# Patient Record
Sex: FEMALE | Race: BLACK OR AFRICAN AMERICAN | NOT HISPANIC OR LATINO | Employment: OTHER | URBAN - METROPOLITAN AREA
[De-identification: names, ages, dates, MRNs, and addresses within clinical notes are randomized per-mention and may not be internally consistent; named-entity substitution may affect disease eponyms.]

---

## 2022-03-12 ENCOUNTER — HOSPITAL ENCOUNTER (INPATIENT)
Facility: OTHER | Age: 82
LOS: 16 days | Discharge: HOME HEALTH | DRG: 872 | End: 2022-03-28
Attending: EMERGENCY MEDICINE | Admitting: INTERNAL MEDICINE
Payer: MEDICARE

## 2022-03-12 DIAGNOSIS — R00.0 TACHYCARDIA: ICD-10-CM

## 2022-03-12 DIAGNOSIS — S20.211A CONTUSION OF RIBS, RIGHT, INITIAL ENCOUNTER: ICD-10-CM

## 2022-03-12 DIAGNOSIS — R80.9 PROTEINURIA, UNSPECIFIED TYPE: ICD-10-CM

## 2022-03-12 DIAGNOSIS — R78.81 BACTEREMIA DUE TO GRAM-POSITIVE BACTERIA: ICD-10-CM

## 2022-03-12 DIAGNOSIS — E87.1 HYPONATREMIA: ICD-10-CM

## 2022-03-12 DIAGNOSIS — R41.82 ALTERED MENTAL STATUS, UNSPECIFIED ALTERED MENTAL STATUS TYPE: Primary | ICD-10-CM

## 2022-03-12 DIAGNOSIS — E83.52 HYPERCALCEMIA: ICD-10-CM

## 2022-03-12 DIAGNOSIS — N17.9 AKI (ACUTE KIDNEY INJURY): ICD-10-CM

## 2022-03-12 DIAGNOSIS — R33.9 URINARY RETENTION: ICD-10-CM

## 2022-03-12 DIAGNOSIS — T14.90XA TRAUMA: ICD-10-CM

## 2022-03-12 DIAGNOSIS — N30.01 ACUTE CYSTITIS WITH HEMATURIA: ICD-10-CM

## 2022-03-12 DIAGNOSIS — E11.65 TYPE 2 DIABETES MELLITUS WITH HYPERGLYCEMIA, WITHOUT LONG-TERM CURRENT USE OF INSULIN: ICD-10-CM

## 2022-03-12 DIAGNOSIS — S09.90XA INJURY OF HEAD, INITIAL ENCOUNTER: ICD-10-CM

## 2022-03-12 DIAGNOSIS — E86.0 DEHYDRATION: ICD-10-CM

## 2022-03-12 DIAGNOSIS — A41.9 SEVERE SEPSIS: ICD-10-CM

## 2022-03-12 DIAGNOSIS — R65.20 SEVERE SEPSIS: ICD-10-CM

## 2022-03-12 PROBLEM — Z79.899 POLYPHARMACY: Status: ACTIVE | Noted: 2021-05-06

## 2022-03-12 PROBLEM — G93.41 ENCEPHALOPATHY, METABOLIC: Status: ACTIVE | Noted: 2022-03-12

## 2022-03-12 PROBLEM — F32.A DEPRESSION: Status: ACTIVE | Noted: 2021-05-06

## 2022-03-12 PROBLEM — F10.10 ETOH ABUSE: Status: ACTIVE | Noted: 2022-03-12

## 2022-03-12 PROBLEM — N39.0 UTI (URINARY TRACT INFECTION): Status: ACTIVE | Noted: 2022-03-12

## 2022-03-12 PROBLEM — Z85.038 HISTORY OF COLON CANCER: Status: ACTIVE | Noted: 2021-05-06

## 2022-03-12 PROBLEM — N18.5 CKD (CHRONIC KIDNEY DISEASE), STAGE V: Status: ACTIVE | Noted: 2022-03-12

## 2022-03-12 LAB
ABO + RH BLD: NORMAL
ALBUMIN SERPL BCP-MCNC: 4 G/DL (ref 3.5–5.2)
ALP SERPL-CCNC: 52 U/L (ref 55–135)
ALT SERPL W/O P-5'-P-CCNC: 42 U/L (ref 10–44)
AMMONIA PLAS-SCNC: 29 UMOL/L (ref 10–50)
AMPHET+METHAMPHET UR QL: NEGATIVE
ANION GAP SERPL CALC-SCNC: 18 MMOL/L (ref 8–16)
AST SERPL-CCNC: 50 U/L (ref 10–40)
B-OH-BUTYR BLD STRIP-SCNC: 2.8 MMOL/L (ref 0–0.5)
BACTERIA #/AREA URNS HPF: ABNORMAL /HPF
BARBITURATES UR QL SCN>200 NG/ML: NEGATIVE
BASOPHILS # BLD AUTO: 0.03 K/UL (ref 0–0.2)
BASOPHILS NFR BLD: 0.2 % (ref 0–1.9)
BENZODIAZ UR QL SCN>200 NG/ML: NEGATIVE
BILIRUB SERPL-MCNC: 0.8 MG/DL (ref 0.1–1)
BILIRUB UR QL STRIP: ABNORMAL
BLD GP AB SCN CELLS X3 SERPL QL: NORMAL
BUN SERPL-MCNC: 64 MG/DL (ref 8–23)
BZE UR QL SCN: NEGATIVE
CALCIUM SERPL-MCNC: 10.9 MG/DL (ref 8.7–10.5)
CANNABINOIDS UR QL SCN: NEGATIVE
CHLORIDE SERPL-SCNC: 92 MMOL/L (ref 95–110)
CK SERPL-CCNC: 833 U/L (ref 20–180)
CLARITY UR: CLEAR
CO2 SERPL-SCNC: 18 MMOL/L (ref 23–29)
COLOR UR: YELLOW
CREAT SERPL-MCNC: 3.4 MG/DL (ref 0.5–1.4)
CREAT UR-MCNC: 162 MG/DL (ref 15–325)
CTP QC/QA: YES
CTP QC/QA: YES
DIFFERENTIAL METHOD: ABNORMAL
EOSINOPHIL # BLD AUTO: 0 K/UL (ref 0–0.5)
EOSINOPHIL NFR BLD: 0 % (ref 0–8)
ERYTHROCYTE [DISTWIDTH] IN BLOOD BY AUTOMATED COUNT: 13.3 % (ref 11.5–14.5)
EST. GFR  (AFRICAN AMERICAN): 14 ML/MIN/1.73 M^2
EST. GFR  (NON AFRICAN AMERICAN): 12 ML/MIN/1.73 M^2
ETHANOL SERPL-MCNC: <10 MG/DL
ETHANOL UR-MCNC: <10 MG/DL
GLUCOSE SERPL-MCNC: 224 MG/DL (ref 70–110)
GLUCOSE UR QL STRIP: ABNORMAL
HCT VFR BLD AUTO: 42 % (ref 37–48.5)
HGB BLD-MCNC: 14.7 G/DL (ref 12–16)
HGB UR QL STRIP: ABNORMAL
HYALINE CASTS #/AREA URNS LPF: 10 /LPF
IMM GRANULOCYTES # BLD AUTO: 0.09 K/UL (ref 0–0.04)
IMM GRANULOCYTES NFR BLD AUTO: 0.6 % (ref 0–0.5)
KETONES UR QL STRIP: NEGATIVE
LACTATE SERPL-SCNC: 2 MMOL/L (ref 0.5–2.2)
LEUKOCYTE ESTERASE UR QL STRIP: NEGATIVE
LYMPHOCYTES # BLD AUTO: 0.8 K/UL (ref 1–4.8)
LYMPHOCYTES NFR BLD: 5.6 % (ref 18–48)
MAGNESIUM SERPL-MCNC: 2.1 MG/DL (ref 1.6–2.6)
MCH RBC QN AUTO: 29.9 PG (ref 27–31)
MCHC RBC AUTO-ENTMCNC: 35 G/DL (ref 32–36)
MCV RBC AUTO: 86 FL (ref 82–98)
METHADONE UR QL SCN>300 NG/ML: NEGATIVE
MICROSCOPIC COMMENT: ABNORMAL
MONOCYTES # BLD AUTO: 0.9 K/UL (ref 0.3–1)
MONOCYTES NFR BLD: 6 % (ref 4–15)
NEUTROPHILS # BLD AUTO: 13 K/UL (ref 1.8–7.7)
NEUTROPHILS NFR BLD: 87.6 % (ref 38–73)
NITRITE UR QL STRIP: NEGATIVE
NRBC BLD-RTO: 0 /100 WBC
OPIATES UR QL SCN: NEGATIVE
PCP UR QL SCN>25 NG/ML: NEGATIVE
PH UR STRIP: 5 [PH] (ref 5–8)
PHOSPHATE SERPL-MCNC: 6 MG/DL (ref 2.7–4.5)
PLATELET # BLD AUTO: 272 K/UL (ref 150–450)
PMV BLD AUTO: 10.2 FL (ref 9.2–12.9)
POC MOLECULAR INFLUENZA A AGN: NEGATIVE
POC MOLECULAR INFLUENZA B AGN: NEGATIVE
POCT GLUCOSE: 236 MG/DL (ref 70–110)
POCT GLUCOSE: 240 MG/DL (ref 70–110)
POTASSIUM SERPL-SCNC: 5.1 MMOL/L (ref 3.5–5.1)
PROCALCITONIN SERPL IA-MCNC: 0.51 NG/ML
PROT SERPL-MCNC: 8.3 G/DL (ref 6–8.4)
PROT UR QL STRIP: ABNORMAL
RBC # BLD AUTO: 4.91 M/UL (ref 4–5.4)
RBC #/AREA URNS HPF: 1 /HPF (ref 0–4)
SARS-COV-2 RDRP RESP QL NAA+PROBE: NEGATIVE
SODIUM SERPL-SCNC: 128 MMOL/L (ref 136–145)
SP GR UR STRIP: >=1.03 (ref 1–1.03)
TOXICOLOGY INFORMATION: NORMAL
URN SPEC COLLECT METH UR: ABNORMAL
UROBILINOGEN UR STRIP-ACNC: NEGATIVE EU/DL
WBC # BLD AUTO: 14.77 K/UL (ref 3.9–12.7)
WBC #/AREA URNS HPF: 1 /HPF (ref 0–5)

## 2022-03-12 PROCEDURE — 36415 COLL VENOUS BLD VENIPUNCTURE: CPT | Performed by: INTERNAL MEDICINE

## 2022-03-12 PROCEDURE — 25000003 PHARM REV CODE 250: Performed by: EMERGENCY MEDICINE

## 2022-03-12 PROCEDURE — 36415 COLL VENOUS BLD VENIPUNCTURE: CPT | Performed by: NURSE PRACTITIONER

## 2022-03-12 PROCEDURE — 87040 BLOOD CULTURE FOR BACTERIA: CPT | Mod: 59 | Performed by: EMERGENCY MEDICINE

## 2022-03-12 PROCEDURE — 99223 PR INITIAL HOSPITAL CARE,LEVL III: ICD-10-PCS | Mod: ,,, | Performed by: NURSE PRACTITIONER

## 2022-03-12 PROCEDURE — 82077 ASSAY SPEC XCP UR&BREATH IA: CPT | Performed by: EMERGENCY MEDICINE

## 2022-03-12 PROCEDURE — 81000 URINALYSIS NONAUTO W/SCOPE: CPT | Performed by: EMERGENCY MEDICINE

## 2022-03-12 PROCEDURE — 80307 DRUG TEST PRSMV CHEM ANLYZR: CPT | Performed by: NURSE PRACTITIONER

## 2022-03-12 PROCEDURE — 99285 EMERGENCY DEPT VISIT HI MDM: CPT | Mod: 25

## 2022-03-12 PROCEDURE — 84207 ASSAY OF VITAMIN B-6: CPT | Performed by: NURSE PRACTITIONER

## 2022-03-12 PROCEDURE — 63600175 PHARM REV CODE 636 W HCPCS: Performed by: EMERGENCY MEDICINE

## 2022-03-12 PROCEDURE — 83605 ASSAY OF LACTIC ACID: CPT | Performed by: EMERGENCY MEDICINE

## 2022-03-12 PROCEDURE — 84145 PROCALCITONIN (PCT): CPT | Mod: 91 | Performed by: INTERNAL MEDICINE

## 2022-03-12 PROCEDURE — 83036 HEMOGLOBIN GLYCOSYLATED A1C: CPT | Performed by: NURSE PRACTITIONER

## 2022-03-12 PROCEDURE — 84145 PROCALCITONIN (PCT): CPT | Performed by: EMERGENCY MEDICINE

## 2022-03-12 PROCEDURE — 11000001 HC ACUTE MED/SURG PRIVATE ROOM

## 2022-03-12 PROCEDURE — 96374 THER/PROPH/DIAG INJ IV PUSH: CPT

## 2022-03-12 PROCEDURE — 93005 ELECTROCARDIOGRAM TRACING: CPT

## 2022-03-12 PROCEDURE — 80053 COMPREHEN METABOLIC PANEL: CPT | Performed by: EMERGENCY MEDICINE

## 2022-03-12 PROCEDURE — 25000003 PHARM REV CODE 250: Performed by: NURSE PRACTITIONER

## 2022-03-12 PROCEDURE — 93010 EKG 12-LEAD: ICD-10-PCS | Mod: ,,, | Performed by: INTERNAL MEDICINE

## 2022-03-12 PROCEDURE — 99223 1ST HOSP IP/OBS HIGH 75: CPT | Mod: ,,, | Performed by: INTERNAL MEDICINE

## 2022-03-12 PROCEDURE — 82550 ASSAY OF CK (CPK): CPT | Performed by: EMERGENCY MEDICINE

## 2022-03-12 PROCEDURE — 82962 GLUCOSE BLOOD TEST: CPT

## 2022-03-12 PROCEDURE — 99223 1ST HOSP IP/OBS HIGH 75: CPT | Mod: ,,, | Performed by: NURSE PRACTITIONER

## 2022-03-12 PROCEDURE — 85025 COMPLETE CBC W/AUTO DIFF WBC: CPT | Performed by: EMERGENCY MEDICINE

## 2022-03-12 PROCEDURE — 86850 RBC ANTIBODY SCREEN: CPT | Performed by: EMERGENCY MEDICINE

## 2022-03-12 PROCEDURE — 84425 ASSAY OF VITAMIN B-1: CPT | Performed by: NURSE PRACTITIONER

## 2022-03-12 PROCEDURE — 99223 PR INITIAL HOSPITAL CARE,LEVL III: ICD-10-PCS | Mod: ,,, | Performed by: INTERNAL MEDICINE

## 2022-03-12 PROCEDURE — U0002 COVID-19 LAB TEST NON-CDC: HCPCS | Performed by: EMERGENCY MEDICINE

## 2022-03-12 PROCEDURE — 82010 KETONE BODYS QUAN: CPT | Performed by: EMERGENCY MEDICINE

## 2022-03-12 PROCEDURE — 82140 ASSAY OF AMMONIA: CPT | Performed by: NURSE PRACTITIONER

## 2022-03-12 PROCEDURE — 84100 ASSAY OF PHOSPHORUS: CPT | Performed by: EMERGENCY MEDICINE

## 2022-03-12 PROCEDURE — 93010 ELECTROCARDIOGRAM REPORT: CPT | Mod: ,,, | Performed by: INTERNAL MEDICINE

## 2022-03-12 PROCEDURE — 96361 HYDRATE IV INFUSION ADD-ON: CPT

## 2022-03-12 PROCEDURE — 83735 ASSAY OF MAGNESIUM: CPT | Performed by: EMERGENCY MEDICINE

## 2022-03-12 RX ORDER — IBUPROFEN 200 MG
24 TABLET ORAL
Status: DISCONTINUED | OUTPATIENT
Start: 2022-03-12 | End: 2022-03-28 | Stop reason: HOSPADM

## 2022-03-12 RX ORDER — INSULIN ASPART 100 [IU]/ML
0-5 INJECTION, SOLUTION INTRAVENOUS; SUBCUTANEOUS
Status: DISCONTINUED | OUTPATIENT
Start: 2022-03-12 | End: 2022-03-28 | Stop reason: HOSPADM

## 2022-03-12 RX ORDER — ROSUVASTATIN CALCIUM 10 MG/1
10 TABLET, COATED ORAL NIGHTLY
COMMUNITY
Start: 2022-02-22

## 2022-03-12 RX ORDER — ALLOPURINOL 100 MG/1
100 TABLET ORAL DAILY
Status: ON HOLD | COMMUNITY
Start: 2022-02-22 | End: 2022-03-23 | Stop reason: HOSPADM

## 2022-03-12 RX ORDER — SODIUM CHLORIDE 9 MG/ML
INJECTION, SOLUTION INTRAVENOUS CONTINUOUS
Status: DISCONTINUED | OUTPATIENT
Start: 2022-03-12 | End: 2022-03-17

## 2022-03-12 RX ORDER — ACETAMINOPHEN 325 MG/1
650 TABLET ORAL EVERY 4 HOURS PRN
Status: DISCONTINUED | OUTPATIENT
Start: 2022-03-12 | End: 2022-03-28 | Stop reason: HOSPADM

## 2022-03-12 RX ORDER — METOPROLOL TARTRATE 25 MG/1
25 TABLET, FILM COATED ORAL 2 TIMES DAILY
Status: ON HOLD | COMMUNITY
Start: 2022-02-22 | End: 2022-03-23 | Stop reason: HOSPADM

## 2022-03-12 RX ORDER — LOSARTAN POTASSIUM 25 MG/1
25 TABLET ORAL DAILY
Status: ON HOLD | COMMUNITY
Start: 2022-02-22 | End: 2022-03-23 | Stop reason: HOSPADM

## 2022-03-12 RX ORDER — ONDANSETRON 2 MG/ML
4 INJECTION INTRAMUSCULAR; INTRAVENOUS EVERY 6 HOURS PRN
Status: DISCONTINUED | OUTPATIENT
Start: 2022-03-12 | End: 2022-03-28 | Stop reason: HOSPADM

## 2022-03-12 RX ORDER — GLUCAGON 1 MG
1 KIT INJECTION
Status: DISCONTINUED | OUTPATIENT
Start: 2022-03-12 | End: 2022-03-28 | Stop reason: HOSPADM

## 2022-03-12 RX ORDER — POLYETHYLENE GLYCOL 3350 17 G/17G
17 POWDER, FOR SOLUTION ORAL 2 TIMES DAILY PRN
Status: DISCONTINUED | OUTPATIENT
Start: 2022-03-12 | End: 2022-03-28 | Stop reason: HOSPADM

## 2022-03-12 RX ORDER — VANCOMYCIN HCL IN 5 % DEXTROSE 1G/250ML
15 PLASTIC BAG, INJECTION (ML) INTRAVENOUS ONCE
Status: COMPLETED | OUTPATIENT
Start: 2022-03-12 | End: 2022-03-12

## 2022-03-12 RX ORDER — METFORMIN HYDROCHLORIDE 500 MG/1
500 TABLET, EXTENDED RELEASE ORAL DAILY
Status: ON HOLD | COMMUNITY
Start: 2022-02-22 | End: 2022-03-23 | Stop reason: HOSPADM

## 2022-03-12 RX ORDER — FLUOXETINE HYDROCHLORIDE 20 MG/1
CAPSULE ORAL
Status: ON HOLD | COMMUNITY
Start: 2021-11-30 | End: 2022-03-23 | Stop reason: HOSPADM

## 2022-03-12 RX ORDER — METOPROLOL TARTRATE 25 MG/1
25 TABLET, FILM COATED ORAL 2 TIMES DAILY
Status: DISCONTINUED | OUTPATIENT
Start: 2022-03-13 | End: 2022-03-12

## 2022-03-12 RX ORDER — IBUPROFEN 200 MG
16 TABLET ORAL
Status: DISCONTINUED | OUTPATIENT
Start: 2022-03-12 | End: 2022-03-28 | Stop reason: HOSPADM

## 2022-03-12 RX ORDER — BUSPIRONE HYDROCHLORIDE 5 MG/1
5 TABLET ORAL 2 TIMES DAILY
Status: ON HOLD | COMMUNITY
Start: 2021-11-05 | End: 2022-03-23 | Stop reason: HOSPADM

## 2022-03-12 RX ADMIN — SODIUM CHLORIDE: 0.9 INJECTION, SOLUTION INTRAVENOUS at 10:03

## 2022-03-12 RX ADMIN — VANCOMYCIN HYDROCHLORIDE 1000 MG: 1 INJECTION, POWDER, LYOPHILIZED, FOR SOLUTION INTRAVENOUS at 07:03

## 2022-03-12 RX ADMIN — SODIUM CHLORIDE 1770 ML: 0.9 INJECTION, SOLUTION INTRAVENOUS at 06:03

## 2022-03-12 NOTE — Clinical Note
Diagnosis: Altered mental status, unspecified altered mental status type [[id number]]   Admitting Provider:: ROBERTA RICARDO [[id number]]   Future Attending Provider: ROBERTA RICARDO [[id number]]   Reason for IP Medical Treatment  (Clinical interventions that can only be accomplished in the IP setting? ) :: IVF, Consults   Estimated Length of Stay:: 2 midnights   I certify that Inpatient services for greater than or equal to 2 midnights are medically necessary:: Yes   Plans for Post-Acute care--if anticipated (pick the single best option):: A. No post acute care anticipated at this time

## 2022-03-12 NOTE — ED NOTES
HPI  Pt brought in by EMS for aloc x today. Per EMS, pt found on ground by family confused with bowel incontinence for unknown duration. Pt awake and alert and following commands but confused. Approx 5 in diameter bruise with distention noted to R lower flank, bruising to chin noted.

## 2022-03-13 LAB
ANION GAP SERPL CALC-SCNC: 12 MMOL/L (ref 8–16)
BNP SERPL-MCNC: 211 PG/ML (ref 0–99)
BUN SERPL-MCNC: 61 MG/DL (ref 8–23)
CALCIUM SERPL-MCNC: 9 MG/DL (ref 8.7–10.5)
CHLORIDE SERPL-SCNC: 102 MMOL/L (ref 95–110)
CO2 SERPL-SCNC: 16 MMOL/L (ref 23–29)
CREAT SERPL-MCNC: 2 MG/DL (ref 0.5–1.4)
ERYTHROCYTE [DISTWIDTH] IN BLOOD BY AUTOMATED COUNT: 13.3 % (ref 11.5–14.5)
EST. GFR  (AFRICAN AMERICAN): 27 ML/MIN/1.73 M^2
EST. GFR  (NON AFRICAN AMERICAN): 23 ML/MIN/1.73 M^2
ESTIMATED AVG GLUCOSE: 143 MG/DL (ref 68–131)
GLUCOSE SERPL-MCNC: 203 MG/DL (ref 70–110)
HBA1C MFR BLD: 6.6 % (ref 4–5.6)
HCT VFR BLD AUTO: 36.1 % (ref 37–48.5)
HGB BLD-MCNC: 12.4 G/DL (ref 12–16)
MAGNESIUM SERPL-MCNC: 1.8 MG/DL (ref 1.6–2.6)
MCH RBC QN AUTO: 29.7 PG (ref 27–31)
MCHC RBC AUTO-ENTMCNC: 34.3 G/DL (ref 32–36)
MCV RBC AUTO: 86 FL (ref 82–98)
OSMOLALITY UR: 467 MOSM/KG (ref 50–1200)
PHOSPHATE SERPL-MCNC: 3.4 MG/DL (ref 2.7–4.5)
PLATELET # BLD AUTO: 197 K/UL (ref 150–450)
PMV BLD AUTO: 10.3 FL (ref 9.2–12.9)
POCT GLUCOSE: 181 MG/DL (ref 70–110)
POCT GLUCOSE: 227 MG/DL (ref 70–110)
POCT GLUCOSE: 235 MG/DL (ref 70–110)
POTASSIUM SERPL-SCNC: 4.7 MMOL/L (ref 3.5–5.1)
PROCALCITONIN SERPL IA-MCNC: 0.34 NG/ML
RBC # BLD AUTO: 4.18 M/UL (ref 4–5.4)
SODIUM SERPL-SCNC: 130 MMOL/L (ref 136–145)
TSH SERPL DL<=0.005 MIU/L-ACNC: 0.67 UIU/ML (ref 0.4–4)
VANCOMYCIN SERPL-MCNC: 5.4 UG/ML
WBC # BLD AUTO: 10.71 K/UL (ref 3.9–12.7)

## 2022-03-13 PROCEDURE — 36415 COLL VENOUS BLD VENIPUNCTURE: CPT | Performed by: NURSE PRACTITIONER

## 2022-03-13 PROCEDURE — 25000003 PHARM REV CODE 250: Performed by: NURSE PRACTITIONER

## 2022-03-13 PROCEDURE — 83935 ASSAY OF URINE OSMOLALITY: CPT | Performed by: NURSE PRACTITIONER

## 2022-03-13 PROCEDURE — 25000003 PHARM REV CODE 250: Performed by: INTERNAL MEDICINE

## 2022-03-13 PROCEDURE — 83735 ASSAY OF MAGNESIUM: CPT | Performed by: NURSE PRACTITIONER

## 2022-03-13 PROCEDURE — 83880 ASSAY OF NATRIURETIC PEPTIDE: CPT | Performed by: NURSE PRACTITIONER

## 2022-03-13 PROCEDURE — 84443 ASSAY THYROID STIM HORMONE: CPT | Performed by: NURSE PRACTITIONER

## 2022-03-13 PROCEDURE — 63600175 PHARM REV CODE 636 W HCPCS: Performed by: NURSE PRACTITIONER

## 2022-03-13 PROCEDURE — 21400001 HC TELEMETRY ROOM

## 2022-03-13 PROCEDURE — 85027 COMPLETE CBC AUTOMATED: CPT | Performed by: NURSE PRACTITIONER

## 2022-03-13 PROCEDURE — 80202 ASSAY OF VANCOMYCIN: CPT | Performed by: NURSE PRACTITIONER

## 2022-03-13 PROCEDURE — 87086 URINE CULTURE/COLONY COUNT: CPT | Performed by: NURSE PRACTITIONER

## 2022-03-13 PROCEDURE — 84100 ASSAY OF PHOSPHORUS: CPT | Performed by: NURSE PRACTITIONER

## 2022-03-13 PROCEDURE — 82272 OCCULT BLD FECES 1-3 TESTS: CPT | Performed by: NURSE PRACTITIONER

## 2022-03-13 PROCEDURE — 97161 PT EVAL LOW COMPLEX 20 MIN: CPT

## 2022-03-13 PROCEDURE — 99233 PR SUBSEQUENT HOSPITAL CARE,LEVL III: ICD-10-PCS | Mod: ,,, | Performed by: INTERNAL MEDICINE

## 2022-03-13 PROCEDURE — 97530 THERAPEUTIC ACTIVITIES: CPT

## 2022-03-13 PROCEDURE — 80048 BASIC METABOLIC PNL TOTAL CA: CPT | Performed by: NURSE PRACTITIONER

## 2022-03-13 PROCEDURE — 99233 SBSQ HOSP IP/OBS HIGH 50: CPT | Mod: ,,, | Performed by: INTERNAL MEDICINE

## 2022-03-13 RX ORDER — METOPROLOL TARTRATE 25 MG/1
25 TABLET, FILM COATED ORAL 2 TIMES DAILY
Status: DISCONTINUED | OUTPATIENT
Start: 2022-03-13 | End: 2022-03-19

## 2022-03-13 RX ADMIN — PIPERACILLIN AND TAZOBACTAM 4.5 G: 4; .5 INJECTION, POWDER, LYOPHILIZED, FOR SOLUTION INTRAVENOUS; PARENTERAL at 07:03

## 2022-03-13 RX ADMIN — SODIUM CHLORIDE: 0.9 INJECTION, SOLUTION INTRAVENOUS at 07:03

## 2022-03-13 RX ADMIN — SODIUM CHLORIDE: 0.9 INJECTION, SOLUTION INTRAVENOUS at 08:03

## 2022-03-13 RX ADMIN — METOPROLOL TARTRATE 25 MG: 25 TABLET, FILM COATED ORAL at 09:03

## 2022-03-13 RX ADMIN — PIPERACILLIN AND TAZOBACTAM 4.5 G: 4; .5 INJECTION, POWDER, LYOPHILIZED, FOR SOLUTION INTRAVENOUS; PARENTERAL at 08:03

## 2022-03-13 RX ADMIN — METOPROLOL TARTRATE 25 MG: 25 TABLET, FILM COATED ORAL at 11:03

## 2022-03-13 NOTE — ASSESSMENT & PLAN NOTE
History of colon cancer and breast cancer.  Concern for positive guaiac testing.  Occult stool ordered.  H&H stable.  Will monitor.

## 2022-03-13 NOTE — ASSESSMENT & PLAN NOTE
Patient has a past medical history of polypharmacy and alcohol abuse.  She was found down on the ground.  Her CT of her head was negative for hemorrhage or evidence of acute infarct; it shows some cerebral volume loss.  Patient also has a history colon cancer.  She is dehydrated, Uremic,  and has concern for a UTI.  Altered mental status likely multifactorial.  Will correct metabolic abnormalities and check urine tox and encephalopathy labs. Start continuous normal saline infusion.  Check TSH, ammonia, urine toxicology, thiamine and folate.  Empiric antibiotics started for UTI.

## 2022-03-13 NOTE — ASSESSMENT & PLAN NOTE
Chronic.  Patient's med list consists of losartan 25 mg daily metoprolol 25 mg b.i.d..  Hold at this time to avoid hypotension.

## 2022-03-13 NOTE — ASSESSMENT & PLAN NOTE
Elevated beta hydroxybutyrate.  Ethanol less than 10. Last drink on known.  Patient with history of beer potamania.  Monitor CIWA scale every 4 hours.

## 2022-03-13 NOTE — PT/OT/SLP EVAL
Physical Therapy Evaluation    Patient Name:  Pearl Villafana   MRN:  [id number]    Recommendations:     Discharge Recommendations:  nursing facility, skilled   Discharge Equipment Recommendations: walker, rolling   Barriers to discharge: Decreased caregiver support    Assessment:     Pearl Villafana is a 81 y.o. female admitted with a medical diagnosis of Severe sepsis.  She presents with the following impairments/functional limitations:  weakness, impaired endurance, impaired functional mobilty, decreased coordination, decreased upper extremity function, decreased lower extremity function, decreased safety awareness, pain, decreased ROM, impaired cardiopulmonary response to activity, impaired joint extensibility Pt was found in bed with HOB raised.  She reported no pain at rest, but later reported 5/10 in her back with sit to supine/supine to sit transfers.  Pt required mod assist to transfer supine to sit.  She refused to attempt to stand reporting that this is the most she has done since she has been here.  Pt sat at the bed side and ate some of her breakfast, performed U knee extension and B shoulder elevation x 10 each.  Pt returned to supine with mod assist and was repositioned in bed with the assist of her nurse.     D/C recommendation SNF.    Rehab Prognosis: Good; patient would benefit from acute skilled PT services to address these deficits and reach maximum level of function.    Recent Surgery: * No surgery found *      Plan:     During this hospitalization, patient to be seen 5 x/week to address the identified rehab impairments via therapeutic activities, gait training, therapeutic exercises, neuromuscular re-education and progress toward the following goals:    · Plan of Care Expires:  04/13/22    Subjective     Chief Complaint: 5/10 pain with bed mobility  Patient/Family Comments/goals: non stated  Pain/Comfort:  · Pain Rating 1: 5/10  · Location - Side 1: Bilateral  · Location - Orientation 1:  lower  · Location 1: back  · Pain Addressed 1: Reposition, Distraction, Nurse notified  · Pain Rating Post-Intervention 1: 0/10    Patients cultural, spiritual, Latter day conflicts given the current situation: no    Living Environment:  Pt reported that she lives in a single story home with her   Prior to admission, patients level of function was reported as independent.  Equipment used at home: none.  DME owned (not currently used): none.  Upon discharge, patient will have assistance from her .    Objective:     Communicated with Pt's nurse prior to session.  Patient found HOB elevated with mcdonald catheter, telemetry  upon PT entry to room.    General Precautions: Standard, fall   Orthopedic Precautions:N/A   Braces: N/A  Respiratory Status: Room air    Exams:     · Cognitive Exam:  Patient is oriented to Person, Place, Time and Situation  · Sensation:    · -       Intact  · Skin Integrity/Edema:      · -       Skin integrity: Bruising of B lower extremities  · RUE ROM: WFL except shoulder to 80 degrees  · RUE Strength: Deficits: shoulder girdle 2/5, elbow and hand musculature 3/5  · LUE ROM: WFL except Shoulder elevation to 60 degrees  · LUE Strength: Deficits: Shoulder girdle 2/5, elbow and hand musculature 3/5  · RLE ROM: WFL  · RLE Strength: Deficits: 2-3/5  · LLE ROM: WFL  · LLE Strength: Deficits: 2-3/5    Functional Mobility:  · Bed Mobility:     · Scooting: maximal assistance  · Supine to Sit: moderate assistance  · Sit to Supine: moderate assistance    Therapeutic Activities and Exercises:   pt performed seated U knee extension x 10 and B shoulder elevation x 10    AM-PAC 6 CLICK MOBILITY  Total Score:8     Patient left HOB elevated with call button in reach and Nurse present.    GOALS:   Multidisciplinary Problems     Physical Therapy Goals        Problem: Physical Therapy Goal    Goal Priority Disciplines Outcome Goal Variances Interventions   Physical Therapy Goal     PT, PT/OT Ongoing,  Progressing     Description: Goals to be met by: 2022    Patient will increase functional independence with mobility by performin. Pt will be able to transfer supine to sit with SBA  2. Sit<>stand with CGA with RW.  3. Gait x 50 feet with CGA with RW.                        History:     Past Medical History:   Diagnosis Date    B12 deficiency anemia     Calcium oxalate renal stones     Cancer     colon cancer    DCIS (ductal carcinoma in situ)     left    Diabetes mellitus     Hyperlipidemia     Hypertension        Past Surgical History:   Procedure Laterality Date    APPENDECTOMY      BREAST SURGERY      breast lumpectomy    CHOLECYSTECTOMY      COLON SURGERY      colon resection    HYSTERECTOMY         Time Tracking:     PT Received On: 22  PT Start Time: 1000     PT Stop Time: 1033  PT Total Time (min): 33 min     Billable Minutes: Evaluation 20, Therapeutic Activity 12 and Total Time 32      2022

## 2022-03-13 NOTE — ASSESSMENT & PLAN NOTE
Initial temperature 95.5°, tachycardia, white blood cells 14.77.  Lactic acid 2. Chest x-ray was negative for any consolidation or pleural effusions.  Concern for UTI due to moderate bacteriuria on analysis.  Patient started on Zosyn and vanc in the ED.  Blood cultures and urine cultures pending.  Continue empiric antibiotics due to severe sepsis.

## 2022-03-13 NOTE — ASSESSMENT & PLAN NOTE
Initial temperature 95.5°, tachycardia, white blood cells 14.77.  Lactic acid 2. Chest x-ray was negative for any consolidation or pleural effusions.  Concern for UTI due to moderate bacteriuria on analysis.  Patient started on Zosyn and vanc in the ED.  Blood cultures NGTD  and urine culture is  pending.  Continue empiric antibiotics for today .

## 2022-03-13 NOTE — ASSESSMENT & PLAN NOTE
Acute kidney injury-likely prerenal  GFR 14 creatinine 3.4  IV fluids started for rehydration   Estimated Creatinine Clearance: 12.3 mL/min (A) (based on SCr of 3.4 mg/dL (H)). according to latest data. Monitor UOP and serial BMP and adjust therapy as needed. Renally dose meds.

## 2022-03-13 NOTE — ASSESSMENT & PLAN NOTE
Patient takes metformin 500 mg daily at home per chart.  Hold metformin at this time.  A1c 6.6.  continue Low-dose correction sliding scale

## 2022-03-13 NOTE — PLAN OF CARE
Problem: Physical Therapy Goal  Goal: Physical Therapy Goal  Description: Goals to be met by: 2022    Patient will increase functional independence with mobility by performin. Pt will be able to transfer supine to sit with SBA  2. Sit<>stand with CGA with RW.  3. Gait x 50 feet with CGA with RW.       Outcome: Ongoing, Progressing   Pt was found in bed with HOB raised.  She reported no pain at rest, but later reported 5/10 in her back with sit to supine/supine to sit transfers.  Pt required mod assist to transfer supine to sit.  She refused to attempt to stand reporting that this is the most she has done since she has been here.  Pt sat at the bed side and ate some of her breakfast, performed U knee extension and B shoulder elevation x 10 each.  Pt returned to supine with mod assist and was repositioned in bed with the assist of her nurse.    D/C recommendation SNF

## 2022-03-13 NOTE — H&P
[facility]  History & Physical    Patient Name: Pearl Villafana  MRN: [id number]  Patient Class: IP- Inpatient  Admission Date: 3/12/2022  Attending Physician: Teresa Ceron MD   Primary Care Provider: Cony Cartagena MD         Patient information was obtained from past medical records and ER records.     Subjective:     Principal Problem:Severe sepsis    Chief Complaint:   Chief Complaint   Patient presents with    Altered Mental Status     Pt brought in by EMS c/o aloc x today per family. Pt found on floor at home by family.         HPI: Calista Kang is an 81-year-old female with a past medical history of alcohol abuse, polypharmacy, colon cancer, breast cancer and hyponatremia along with diabetes hyper tension hyperlipidemia B12 deficiency and gout.  Her family contacted EMS to do a welfare check and they found her down on the ground with altered mental status in urine and feces.  Length of time down on known.  Patient is arousable to tactile touch in the bed.  She is able to tell her name her location and her date of birth.  She does follow simple commands.  Patient answers no to most questions.  She does name objects correctly.  Patient is hypothermic at 95.5° and tachycardic at 104 with moderate bacteriuria in her urinalysis. Code sepsis was called patient received a bolus of normal saline vancomycin and Zosyn.  No signs of respiratory distress.  Patient has some bruising to her right right lateral abdominal area.  No abdominal distension.  No tenderness or guarding to abdominal area.  Patient denies pain.  Patient reports she does not know why mg for how long.  She says that she remembers eating breakfast yesterday and going to some doctor appointments.  There is no family at bedside and her 's phone number is disconnected.  There are no other family members phone numbers in her chart.  Unable to confirm her history or her medications at this time.  Patient  admitted to hospital medicine for further workup.      Past Medical History:   Diagnosis Date    B12 deficiency anemia     Calcium oxalate renal stones     Cancer     colon cancer    DCIS (ductal carcinoma in situ)     left    Diabetes mellitus     Hyperlipidemia     Hypertension        Past Surgical History:   Procedure Laterality Date    APPENDECTOMY      BREAST SURGERY      breast lumpectomy    CHOLECYSTECTOMY      COLON SURGERY      colon resection    HYSTERECTOMY         Review of patient's allergies indicates:  No Known Allergies    No current facility-administered medications on file prior to encounter.     Current Outpatient Medications on File Prior to Encounter   Medication Sig    allopurinoL (ZYLOPRIM) 100 MG tablet Take 100 mg by mouth once daily.    busPIRone (BUSPAR) 5 MG Tab Take 5 mg by mouth 2 (two) times daily.    FLUoxetine 20 MG capsule     lisinopril (PRINIVIL,ZESTRIL) 5 MG tablet Take 1 tablet (5 mg total) by mouth once daily.    losartan (COZAAR) 25 MG tablet Take 25 mg by mouth once daily.    metFORMIN (GLUCOPHAGE-XR) 500 MG ER 24hr tablet Take 500 mg by mouth once daily.    metoprolol tartrate (LOPRESSOR) 25 MG tablet Take 25 mg by mouth 2 (two) times daily.    rosuvastatin (CRESTOR) 10 MG tablet Take 10 mg by mouth nightly.    [DISCONTINUED] alprazolam (XANAX) 0.25 MG tablet Take 1 tablet (0.25 mg total) by mouth 2 (two) times daily as needed for Anxiety.    [DISCONTINUED] cyanocobalamin 1,000 mcg/mL injection INJECT 1ML INTRAMUSCULARLY MONTHLY.    [DISCONTINUED] metformin (GLUCOPHAGE) 1000 MG tablet Take 1 tablet (1,000 mg total) by mouth once daily.    [DISCONTINUED] peg 3350-electrolytes-vit C (MOVIPREP) 100-7.5-2.691 gram PwPk Take 1 kit by mouth as directed.    [DISCONTINUED] pravastatin (PRAVACHOL) 20 MG tablet TAKE 1 TABLET ONCE DAILY FOR CHOLESTEROL.     Family History    None       Tobacco Use    Smoking status: Passive Smoke Exposure - Never Smoker     Smokeless tobacco: Not on file   Substance and Sexual Activity    Alcohol use: Yes     Alcohol/week: 28.0 standard drinks     Types: 28 Cans of beer per week     Comment: 4 beers a day    Drug use: No    Sexual activity: Not Currently     Partners: Male     Review of Systems   Constitutional:  Negative for activity change, chills and fever.   HENT:  Negative for congestion and trouble swallowing.    Eyes:  Negative for photophobia and visual disturbance.   Respiratory:  Negative for cough, choking and shortness of breath.    Cardiovascular:  Negative for chest pain and palpitations.   Gastrointestinal:  Negative for abdominal distention, abdominal pain, diarrhea, nausea and vomiting.   Endocrine: Negative for polydipsia and polyuria.   Genitourinary:  Negative for difficulty urinating, dysuria, frequency and pelvic pain.   Musculoskeletal:  Negative for arthralgias, back pain, gait problem, myalgias and neck pain.   Skin:  Negative for rash and wound.   Neurological:  Negative for dizziness, speech difficulty, weakness and headaches.   Psychiatric/Behavioral:  Negative for confusion and sleep disturbance.    Objective:     Vital Signs (Most Recent):  Temp: 96.9 °F (36.1 °C) (03/12/22 1958)  Pulse: 92 (03/12/22 1958)  Resp: 17 (03/12/22 1958)  BP: (!) 150/72 (03/12/22 1953)  SpO2: 99 % (03/12/22 1958)   Vital Signs (24h Range):  Temp:  [95.5 °F (35.3 °C)-96.9 °F (36.1 °C)] 96.9 °F (36.1 °C)  Pulse:  [] 92  Resp:  [15-40] 17  SpO2:  [97 %-99 %] 99 %  BP: (147-150)/(72-95) 150/72     Weight: 59 kg (130 lb)  Body mass index is 20.98 kg/m².    Physical Exam  Vitals and nursing note reviewed.   Constitutional:       General: She is not in acute distress.     Appearance: She is ill-appearing.   HENT:      Head: Normocephalic and atraumatic.      Right Ear: External ear normal.      Left Ear: External ear normal.      Nose: No congestion.      Mouth/Throat:      Mouth: Mucous membranes are moist.      Pharynx:  Oropharynx is clear.   Eyes:      General:         Right eye: No discharge.         Left eye: No discharge.      Extraocular Movements: Extraocular movements intact.   Cardiovascular:      Rate and Rhythm: Regular rhythm. Tachycardia present.      Heart sounds: Normal heart sounds. No murmur heard.  Pulmonary:      Effort: Pulmonary effort is normal. No respiratory distress.      Breath sounds: Normal breath sounds. No wheezing or rhonchi.   Abdominal:      General: Bowel sounds are normal. There is no distension.      Palpations: Abdomen is soft.      Tenderness: There is no abdominal tenderness. There is no guarding.   Musculoskeletal:         General: No swelling or tenderness. Normal range of motion.      Cervical back: Normal range of motion. No rigidity or tenderness.   Skin:     General: Skin is warm.      Findings: Bruising (Right lateral abdomen) present. No lesion or rash.   Neurological:      Mental Status: She is easily aroused.      GCS: GCS eye subscore is 3. GCS verbal subscore is 5. GCS motor subscore is 6.      Cranial Nerves: No facial asymmetry.      Motor: Weakness (general weakness) present.   Psychiatric:         Behavior: Behavior is withdrawn.         Cognition and Memory: Memory is impaired.           Significant Labs: All pertinent labs within the past 24 hours have been reviewed.  A1C: No results for input(s): HGBA1C in the last 4320 hours.  Blood Culture: No results for input(s): LABBLOO in the last 48 hours.  CBC:   Recent Labs   Lab 03/12/22  1748   WBC 14.77*   HGB 14.7   HCT 42.0        CMP:   Recent Labs   Lab 03/12/22  1748   *   K 5.1   CL 92*   CO2 18*   *   BUN 64*   CREATININE 3.4*   CALCIUM 10.9*   PROT 8.3   ALBUMIN 4.0   BILITOT 0.8   ALKPHOS 52*   AST 50*   ALT 42   ANIONGAP 18*   EGFRNONAA 12*     Cardiac Markers: No results for input(s): CKMB, MYOGLOBIN, BNP, TROPISTAT in the last 48 hours.  Magnesium:   Recent Labs   Lab 03/12/22  1758   MG 2.1      TSH: No results for input(s): TSH in the last 4320 hours.  Urine Culture: No results for input(s): LABURIN in the last 48 hours.  Urine Studies:   Recent Labs   Lab 03/12/22  1856   COLORU Yellow   APPEARANCEUA Clear   PHUR 5.0   SPECGRAV >=1.030*   PROTEINUA 2+*   GLUCUA 1+*   KETONESU Negative   BILIRUBINUA 1+*   OCCULTUA 2+*   NITRITE Negative   UROBILINOGEN Negative   LEUKOCYTESUR Negative   RBCUA 1   WBCUA 1   BACTERIA Moderate*   HYALINECASTS 10*       Significant Imaging: I have reviewed all pertinent imaging results/findings within the past 24 hours.  X-Ray Ribs 2 View Right  Narrative: EXAMINATION:  XR CHEST AP PORTABLE; XR RIBS 2 VIEW RIGHT    CLINICAL HISTORY:  Sepsis; Injury, unspecified, initial encounter    TECHNIQUE:  Single frontal view of the chest was performed.  Right ribs two views.    COMPARISON:  November 2014.    FINDINGS:  Cardiac silhouette is normal in size.  Lungs are symmetrically expanded.  No evidence of focal consolidative process, pneumothorax, or significant pleural effusion.  No acute osseous abnormality identified.  No acute displaced right-sided rib fractures are identified.  Impression: No acute cardiopulmonary process identified.    No acute right-sided rib fracture seen.    Electronically signed by: Geraldo Stewart MD  Date:    03/12/2022  Time:    19:31  X-Ray Chest AP Portable  Narrative: EXAMINATION:  XR CHEST AP PORTABLE; XR RIBS 2 VIEW RIGHT    CLINICAL HISTORY:  Sepsis; Injury, unspecified, initial encounter    TECHNIQUE:  Single frontal view of the chest was performed.  Right ribs two views.    COMPARISON:  November 2014.    FINDINGS:  Cardiac silhouette is normal in size.  Lungs are symmetrically expanded.  No evidence of focal consolidative process, pneumothorax, or significant pleural effusion.  No acute osseous abnormality identified.  No acute displaced right-sided rib fractures are identified.  Impression: No acute cardiopulmonary process identified.    No acute  right-sided rib fracture seen.    Electronically signed by: Geraldo Stewart MD  Date:    03/12/2022  Time:    19:31  CT Head Without Contrast  Narrative: EXAMINATION:  CT HEAD WITHOUT CONTRAST    CLINICAL HISTORY:  Head trauma, moderate-severe;    TECHNIQUE:  Low dose axial images were obtained through the head.  Coronal and sagittal reformations were also performed. Contrast was not administered.    COMPARISON:  CT head from November 2012.    FINDINGS:  There is mild generalized cerebral volume loss and chronic microvascular ischemic change.  No evidence of acute/recent major vascular distribution cerebral infarction, intraparenchymal hemorrhage, or intra-axial space occupying lesion. The ventricular system is normal in size and configuration with no evidence of hydrocephalus. No effacement of the skull-base cisterns. No abnormal extra-axial fluid collections or blood products. Visualized paranasal sinuses and mastoid air cells are clear. The calvarium shows no significant abnormality.  Impression: No acute intracranial abnormalities identified.    Electronically signed by: Geraldo Stewart MD  Date:    03/12/2022  Time:    19:28      Assessment/Plan:     * Severe sepsis  Initial temperature 95.5°, tachycardia, white blood cells 14.77.  Lactic acid 2. Chest x-ray was negative for any consolidation or pleural effusions.  Concern for UTI due to moderate bacteriuria on analysis.  Patient started on Zosyn and vanc in the ED.  Blood cultures and urine cultures pending.  Continue empiric antibiotics due to severe sepsis.      Altered mental status, unspecified  Patient has a past medical history of polypharmacy and alcohol abuse.  She was found down on the ground.  Her CT of her head was negative for hemorrhage or evidence of acute infarct; it shows some cerebral volume loss.  Patient also has a history colon cancer.  She is dehydrated, Uremic,  and has concern for a UTI. Her beta hydroxybuterate is elevated.  Altered  mental status likely multifactorial.  Will correct metabolic abnormalities and check urine tox and encephalopathy labs. Start continuous normal saline infusion.  Check TSH, ammonia, urine toxicology, thiamine and folate.  Empiric antibiotics started for UTI.    History of alcohol abuse  Elevated beta hydroxybutyrate.  Ethanol less than 10. Last drink on known.  Patient with history of beer potamania.  Monitor CIWA scale every 4 hours.      Hyponatremia  Sodium is 128. Patient has a history of hyponatremia due to alcoholism.  Dehydration also playing a role.  Will check urine osmolality.      CKD (chronic kidney disease), stage V  Acute kidney injury-likely prerenal  GFR 14 creatinine 3.4  IV fluids started for rehydration   Estimated Creatinine Clearance: 12.3 mL/min (A) (based on SCr of 3.4 mg/dL (H)). according to latest data. Monitor UOP and serial BMP and adjust therapy as needed. Renally dose meds.            Hypertension  Chronic.  Patient's med list consists of losartan 25 mg daily metoprolol 25 mg b.i.d..  Hold at this time to avoid hypotension.      Diabetes mellitus type II  Patient takes metformin 500 mg daily at home per chart.  Hold metformin at this time.  A1c pending.  Low-dose correction sliding scale started.      Hyperlipidemia  For chronic.  Patient takes Crestor.  Crestor held this time due to elevation of liver enzymes.      Colon cancer  History of colon cancer and breast cancer.  Concern for positive guaiac testing.  Occult stool ordered.  H&H stable at 14.7/42.  Will monitor.        VTE Risk Mitigation (From admission, onward)         Ordered     Reason for No Pharmacological VTE Prophylaxis  Once        Question:  Reasons:  Answer:  Active Bleeding    03/12/22 2041     IP VTE HIGH RISK PATIENT  Once         03/12/22 2041     Place sequential compression device  Until discontinued         03/12/22 2041                   Earline Peters DNP  Department of Hospital Medicine   [facility]  Surg ([place])

## 2022-03-13 NOTE — ASSESSMENT & PLAN NOTE
History of colon cancer and breast cancer.  Concern for positive guaiac testing.  Occult stool ordered.  H&H stable at 14.7/42.  Will monitor.

## 2022-03-13 NOTE — PT/OT/SLP PROGRESS
"Occupational Therapy      Patient Name:  Pearl Villafana   MRN:  [id number]    Patient not seen today secondary to lower back Pain rated 10/10.  Assisted Pt. With lateral movement of BLE towards side of bed with Pt. Stating, "Ouch" and "I can't do this right now."  Pt. With increased pain and reporting she had sat up yesterday; given increased encouragement for EOB sitting though Pt. With continued refusal.  Pt. Was agreeable to participate in client interview with living environment and PLOF as follows:  Lives with spouse in 2SH with 0 HAL and bedroom/bathroom on 1st level; bathroom setup as tub/shower and standard toilet.  Pt. Reports ambulating without AD and not having any DME at home.  Reports 1 month ago she was able to take sit down baths and also would go to grocery store via lift.  Just PTA Pt. Reports only taking sink baths as she is unable to get into/out of tub and no longer grocery shopping.  Does simple meal prep and has recently been eating canned goods for meals.  Pt. Reports no falls in last month though EMR shows that Pt. Was found by EMS down on the ground in urine/feces with AMS after family requested well fair check.  Today, Pt. Is alert and knows name, , location, and current year though states the month is "February."  Will follow-up later as time permits.    3/13/2022  "

## 2022-03-13 NOTE — HPI
Calista Kang is an 81-year-old female with a past medical history of alcohol abuse, polypharmacy, colon cancer, breast cancer and hyponatremia along with diabetes hyper tension hyperlipidemia B12 deficiency and gout.  Her family contacted EMS to do a welfare check and they found her down on the ground with altered mental status in urine and feces.  Length of time down on known.  Patient is arousable to tactile touch in the bed.  She is able to tell her name her location and her date of birth.  She does follow simple commands.  Patient answers no to most questions.  She does name objects correctly.  Patient is hypothermic at 95.5° and tachycardic at 104 with moderate bacteriuria in her urinalysis. Code sepsis was called patient received a bolus of normal saline vancomycin and Zosyn.  No signs of respiratory distress.  Patient has some bruising to her right right lateral abdominal area.  No abdominal distension.  No tenderness or guarding to abdominal area.  Patient denies pain.  Patient reports she does not know why mg for how long.  She says that she remembers eating breakfast yesterday and going to some doctor appointments.  There is no family at bedside and her 's phone number is disconnected.  There are no other family members phone numbers in her chart.  Unable to confirm her history or her medications at this time.  Patient admitted to hospital medicine for further workup.

## 2022-03-13 NOTE — NURSING
"As of 0525, the patient is fully awake, A&Ox4. Pt is aware of her situation and why she is here. Pt states she lives at home with her . Pt was able to engage in conversation and ask questions. Pt has no complaints of pain, when asked, pt states she is "ok". Pt vitals are stable. Pt was able to drink fluids with no issues. Pt is sitting up in bed watching television. Bed in lowest  Position, call light in reach, side rails up x 2. Will continue to monitor.  "

## 2022-03-13 NOTE — PROGRESS NOTES
[facility] [place] [facility] Medicine  Progress Note    Patient Name: Pearl Villafana  MRN: [id number]  Patient Class: IP- Inpatient   Admission Date: 3/12/2022  Length of Stay: 1 days  Attending Physician: Teresa Ceron MD  Primary Care Provider: Cony Cartagena MD        Subjective:     Principal Problem:Severe sepsis        HPI:  Calista Kang is an 81-year-old female with a past medical history of alcohol abuse, polypharmacy, colon cancer, breast cancer and hyponatremia along with diabetes hyper tension hyperlipidemia B12 deficiency and gout.  Her family contacted EMS to do a welfare check and they found her down on the ground with altered mental status in urine and feces.  Length of time down on known.  Patient is arousable to tactile touch in the bed.  She is able to tell her name her location and her date of birth.  She does follow simple commands.  Patient answers no to most questions.  She does name objects correctly.  Patient is hypothermic at 95.5° and tachycardic at 104 with moderate bacteriuria in her urinalysis. Code sepsis was called patient received a bolus of normal saline vancomycin and Zosyn.  No signs of respiratory distress.  Patient has some bruising to her right right lateral abdominal area.  No abdominal distension.  No tenderness or guarding to abdominal area.  Patient denies pain.  Patient reports she does not know why mg for how long.  She says that she remembers eating breakfast yesterday and going to some doctor appointments.  There is no family at bedside and her 's phone number is disconnected.  There are no other family members phone numbers in her chart.  Unable to confirm her history or her medications at this time.  Patient admitted to hospital medicine for further workup.      Overview/Hospital Course:  No notes on file    Interval History: NAEON.     Review of Systems   Constitutional:  Negative for appetite change.   Respiratory:  Negative for cough and  shortness of breath.    Cardiovascular:  Negative for chest pain and leg swelling.   Gastrointestinal:  Negative for abdominal distention, abdominal pain, constipation and diarrhea.   Genitourinary:  Negative for difficulty urinating and dysuria.   Neurological:  Negative for dizziness and headaches.   Objective:     Vital Signs (Most Recent):  Temp: 97.4 °F (36.3 °C) (03/13/22 0711)  Pulse: 107 (03/13/22 1000)  Resp: 12 (03/13/22 0711)  BP: (!) 140/83 (03/13/22 0711)  SpO2: 95 % (03/13/22 0711)   Vital Signs (24h Range):  Temp:  [95.5 °F (35.3 °C)-99 °F (37.2 °C)] 97.4 °F (36.3 °C)  Pulse:  [] 107  Resp:  [12-40] 12  SpO2:  [95 %-99 %] 95 %  BP: (127-150)/(64-95) 140/83     Weight: 59 kg (130 lb)  Body mass index is 20.98 kg/m².    Intake/Output Summary (Last 24 hours) at 3/13/2022 1048  Last data filed at 3/13/2022 0500  Gross per 24 hour   Intake 120 ml   Output 975 ml   Net -855 ml      Physical Exam  Constitutional:       Appearance: Normal appearance.   HENT:      Head: Normocephalic and atraumatic.      Mouth/Throat:      Mouth: Mucous membranes are dry.   Cardiovascular:      Rate and Rhythm: Normal rate and regular rhythm.      Pulses: Normal pulses.      Heart sounds: Normal heart sounds.   Pulmonary:      Effort: Pulmonary effort is normal.      Breath sounds: Normal breath sounds. No rales.   Abdominal:      General: Abdomen is flat. Bowel sounds are normal. There is no distension.      Palpations: Abdomen is soft.      Tenderness: There is no abdominal tenderness.   Musculoskeletal:         General: Normal range of motion.      Cervical back: Normal range of motion and neck supple.   Skin:     General: Skin is warm and dry.   Neurological:      General: No focal deficit present.      Mental Status: She is alert and oriented to person, place, and time.   Psychiatric:         Mood and Affect: Mood normal.       Significant Labs: All pertinent labs within the past 24 hours have been  reviewed.    Significant Imaging: I have reviewed all pertinent imaging results/findings within the past 24 hours.      Assessment/Plan:      * Severe sepsis  Initial temperature 95.5°, tachycardia, white blood cells 14.77.  Lactic acid 2. Chest x-ray was negative for any consolidation or pleural effusions.  Concern for UTI due to moderate bacteriuria on analysis.  Patient started on Zosyn and vanc in the ED.  Blood cultures NGTD  and urine culture is  pending.  Continue empiric antibiotics for today .       ETOH abuse  Elevated beta hydroxybutyrate.  Ethanol less than 10. Last drink on known.  Patient with history of beer potamania.  Monitor CIWA scale every 4 hours.      CKD (chronic kidney disease), stage V  Acute kidney injury-likely prerenal  GFR 14 creatinine 3.4. improving with IV hydration    Estimated Creatinine Clearance: 20.9 mL/min (A) (based on SCr of 2 mg/dL (H)). according to latest data. Monitor UOP and serial BMP and adjust therapy as needed. Renally dose meds.            Altered mental status, unspecified  Patient has a past medical history of polypharmacy and alcohol abuse.  She was found down on the ground.  Her CT of her head was negative for hemorrhage or evidence of acute infarct; it shows some cerebral volume loss.  Patient also has a history colon cancer.  She is dehydrated, Uremic,  and has concern for a UTI.  Altered mental status likely multifactorial.  Improved today     Hyponatremia  Sodium was 128. Today 130 . Patient has a history of hyponatremia due to alcoholism.  Will monitor       Colon cancer  History of colon cancer and breast cancer.  Concern for positive guaiac testing.  Occult stool ordered.  H&H stable.  Will monitor.      Diabetes mellitus type II  Patient takes metformin 500 mg daily at home per chart.  Hold metformin at this time.  A1c 6.6.  continue Low-dose correction sliding scale       Hyperlipidemia  For chronic.  Patient takes Crestor.  Crestor held this time due to  elevation of liver enzymes.      Hypertension  Chronic.  Patient's med list consists of losartan 25 mg daily metoprolol 25 mg b.i.d..  Will resume metoprolol         VTE Risk Mitigation (From admission, onward)         Ordered     Reason for No Pharmacological VTE Prophylaxis  Once        Question:  Reasons:  Answer:  Active Bleeding    03/12/22 2041     IP VTE HIGH RISK PATIENT  Once         03/12/22 2041     Place sequential compression device  Until discontinued         03/12/22 2041                Discharge Planning   SHANIKA:      Code Status: Full Code   Is the patient medically ready for discharge?:     Reason for patient still in hospital (select all that apply): Treatment                     Teresa Ceron MD  Department of Hospital Medicine   [facility] ([place])

## 2022-03-13 NOTE — PLAN OF CARE
[facility] ([place])  Initial Discharge Assessment       Primary Care Provider: Cleopatra Whaley MD    Admission Diagnosis: Hypercalcemia [E83.52]  Dehydration [E86.0]  Hyponatremia [E87.1]  Trauma [T14.90XA]  Tachycardia [R00.0]  Acute cystitis with hematuria [N30.01]  BREE (acute kidney injury) [N17.9]  Injury of head, initial encounter [S09.90XA]  Contusion of ribs, right, initial encounter [S20.211A]  Altered mental status, unspecified altered mental status type [R41.82]  Proteinuria, unspecified type [R80.9]    Admission Date: 3/12/2022  Expected Discharge Date:     Discharge Barriers Identified: None    Payor: [employer] / Plan: [employer] / Product Type: Commercial /     Extended Emergency Contact Information  Primary Emergency Contact: Tad Villafana   [facility]  Home Phone: [phone]  Relation: Spouse  Secondary Emergency Contact: Ajit Ortez   [facility]  Home Phone: [phone]  Relation: Grandchild    Discharge Plan A: Home Health  Discharge Plan B: Skilled Nursing Facility      [facility] - [facility] [place] - [address]  [id number]  [place] [address]  Phone: [phone] Fax: [phone]    [facility] - [facility] [place], [place] - [address]  [address]  [facility] [place] [address]  Phone: [phone] Fax: [phone]    SW met with patient and patient's daughter and son at bedside to complete discharge planning assessment.  Patient alert and oriented.  Patient verified all demographic information on facesheet is correct.  Patient verified PCP is Dr. Whaley.  Patient verified primary health insurance is [employer]; however patient's son states patient has Medicare and [employer] (will bring cards).  Patient with NO home health or DME.  Patient with NO POA or Living Will.  Patient not on dialysis or medication coumadin.  Patient with no 30 day admission.   Patient with no financial issues at this time.  Patient family will provide transportation upon discharge from facility.  Patient independent with ADLs, live with spouse.  Patient goes to [facility].    Initial Assessment (most recent)     Adult Discharge Assessment - 03/13/22 1416        Discharge Assessment    Assessment Type Discharge Planning Assessment     Confirmed/corrected address, phone number and insurance Yes     Confirmed Demographics Correct on Facesheet     Source of Information family;patient     Communicated SHANIKA with patient/caregiver Date not available/Unable to determine     Lives With spouse     Facility Arrived From: home     Do you expect to return to your current living situation? Yes     Do you have help at home or someone to help you manage your care at home? Yes     Who are your caregiver(s) and their phone number(s)? family     Prior to hospitilization cognitive status: Alert/Oriented     Current cognitive status: Alert/Oriented     Walking or Climbing Stairs Difficulty none     Dressing/Bathing Difficulty none     Equipment Currently Used at Home none     Readmission within 30 days? No     Patient currently being followed by outpatient case management? No     Do you currently have service(s) that help you manage your care at home? No     Do you take prescription medications? No     Do you have prescription coverage? Yes     Do you have any problems affording any of your prescribed medications? No     Is the patient taking medications as prescribed? yes     Who is going to help you get home at discharge? family     How do you get to doctors appointments? family or friend will provide;other (see comments)     Are you on dialysis? No     Do you take coumadin? No     Discharge Plan A Home Health     Discharge Plan B Skilled Nursing Facility     DME Needed Upon Discharge  none     Discharge Plan discussed with: Adult children;Patient     Discharge Barriers Identified None

## 2022-03-13 NOTE — ASSESSMENT & PLAN NOTE
Patient has a past medical history of polypharmacy and alcohol abuse.  She was found down on the ground.  Her CT of her head was negative for hemorrhage or evidence of acute infarct; it shows some cerebral volume loss.  Patient also has a history colon cancer.  She is dehydrated, Uremic,  and has concern for a UTI.  Altered mental status likely multifactorial.  Improved today

## 2022-03-13 NOTE — ASSESSMENT & PLAN NOTE
Sodium was 128. Today 130 . Patient has a history of hyponatremia due to alcoholism.  Will monitor

## 2022-03-13 NOTE — SUBJECTIVE & OBJECTIVE
Interval History: NAEON.     Review of Systems   Constitutional:  Negative for appetite change.   Respiratory:  Negative for cough and shortness of breath.    Cardiovascular:  Negative for chest pain and leg swelling.   Gastrointestinal:  Negative for abdominal distention, abdominal pain, constipation and diarrhea.   Genitourinary:  Negative for difficulty urinating and dysuria.   Neurological:  Negative for dizziness and headaches.   Objective:     Vital Signs (Most Recent):  Temp: 97.4 °F (36.3 °C) (03/13/22 0711)  Pulse: 107 (03/13/22 1000)  Resp: 12 (03/13/22 0711)  BP: (!) 140/83 (03/13/22 0711)  SpO2: 95 % (03/13/22 0711)   Vital Signs (24h Range):  Temp:  [95.5 °F (35.3 °C)-99 °F (37.2 °C)] 97.4 °F (36.3 °C)  Pulse:  [] 107  Resp:  [12-40] 12  SpO2:  [95 %-99 %] 95 %  BP: (127-150)/(64-95) 140/83     Weight: 59 kg (130 lb)  Body mass index is 20.98 kg/m².    Intake/Output Summary (Last 24 hours) at 3/13/2022 1048  Last data filed at 3/13/2022 0500  Gross per 24 hour   Intake 120 ml   Output 975 ml   Net -855 ml      Physical Exam  Constitutional:       Appearance: Normal appearance.   HENT:      Head: Normocephalic and atraumatic.      Mouth/Throat:      Mouth: Mucous membranes are dry.   Cardiovascular:      Rate and Rhythm: Normal rate and regular rhythm.      Pulses: Normal pulses.      Heart sounds: Normal heart sounds.   Pulmonary:      Effort: Pulmonary effort is normal.      Breath sounds: Normal breath sounds. No rales.   Abdominal:      General: Abdomen is flat. Bowel sounds are normal. There is no distension.      Palpations: Abdomen is soft.      Tenderness: There is no abdominal tenderness.   Musculoskeletal:         General: Normal range of motion.      Cervical back: Normal range of motion and neck supple.   Skin:     General: Skin is warm and dry.   Neurological:      General: No focal deficit present.      Mental Status: She is alert and oriented to person, place, and time.    Psychiatric:         Mood and Affect: Mood normal.       Significant Labs: All pertinent labs within the past 24 hours have been reviewed.    Significant Imaging: I have reviewed all pertinent imaging results/findings within the past 24 hours.

## 2022-03-13 NOTE — SUBJECTIVE & OBJECTIVE
Past Medical History:   Diagnosis Date    B12 deficiency anemia     Calcium oxalate renal stones     Cancer     colon cancer    DCIS (ductal carcinoma in situ)     left    Diabetes mellitus     Hyperlipidemia     Hypertension        Past Surgical History:   Procedure Laterality Date    APPENDECTOMY      BREAST SURGERY      breast lumpectomy    CHOLECYSTECTOMY      COLON SURGERY      colon resection    HYSTERECTOMY         Review of patient's allergies indicates:  No Known Allergies    No current facility-administered medications on file prior to encounter.     Current Outpatient Medications on File Prior to Encounter   Medication Sig    allopurinoL (ZYLOPRIM) 100 MG tablet Take 100 mg by mouth once daily.    busPIRone (BUSPAR) 5 MG Tab Take 5 mg by mouth 2 (two) times daily.    FLUoxetine 20 MG capsule     lisinopril (PRINIVIL,ZESTRIL) 5 MG tablet Take 1 tablet (5 mg total) by mouth once daily.    losartan (COZAAR) 25 MG tablet Take 25 mg by mouth once daily.    metFORMIN (GLUCOPHAGE-XR) 500 MG ER 24hr tablet Take 500 mg by mouth once daily.    metoprolol tartrate (LOPRESSOR) 25 MG tablet Take 25 mg by mouth 2 (two) times daily.    rosuvastatin (CRESTOR) 10 MG tablet Take 10 mg by mouth nightly.    [DISCONTINUED] alprazolam (XANAX) 0.25 MG tablet Take 1 tablet (0.25 mg total) by mouth 2 (two) times daily as needed for Anxiety.    [DISCONTINUED] cyanocobalamin 1,000 mcg/mL injection INJECT 1ML INTRAMUSCULARLY MONTHLY.    [DISCONTINUED] metformin (GLUCOPHAGE) 1000 MG tablet Take 1 tablet (1,000 mg total) by mouth once daily.    [DISCONTINUED] peg 3350-electrolytes-vit C (MOVIPREP) 100-7.5-2.691 gram PwPk Take 1 kit by mouth as directed.    [DISCONTINUED] pravastatin (PRAVACHOL) 20 MG tablet TAKE 1 TABLET ONCE DAILY FOR CHOLESTEROL.     Family History    None       Tobacco Use    Smoking status: Passive Smoke Exposure - Never Smoker    Smokeless tobacco: Not on file   Substance and Sexual Activity    Alcohol use: Yes      Alcohol/week: 28.0 standard drinks     Types: 28 Cans of beer per week     Comment: 4 beers a day    Drug use: No    Sexual activity: Not Currently     Partners: Male     Review of Systems   Constitutional:  Negative for activity change, chills and fever.   HENT:  Negative for congestion and trouble swallowing.    Eyes:  Negative for photophobia and visual disturbance.   Respiratory:  Negative for cough, choking and shortness of breath.    Cardiovascular:  Negative for chest pain and palpitations.   Gastrointestinal:  Negative for abdominal distention, abdominal pain, diarrhea, nausea and vomiting.   Endocrine: Negative for polydipsia and polyuria.   Genitourinary:  Negative for difficulty urinating, dysuria, frequency and pelvic pain.   Musculoskeletal:  Negative for arthralgias, back pain, gait problem, myalgias and neck pain.   Skin:  Negative for rash and wound.   Neurological:  Negative for dizziness, speech difficulty, weakness and headaches.   Psychiatric/Behavioral:  Negative for confusion and sleep disturbance.    Objective:     Vital Signs (Most Recent):  Temp: 96.9 °F (36.1 °C) (03/12/22 1958)  Pulse: 92 (03/12/22 1958)  Resp: 17 (03/12/22 1958)  BP: (!) 150/72 (03/12/22 1953)  SpO2: 99 % (03/12/22 1958)   Vital Signs (24h Range):  Temp:  [95.5 °F (35.3 °C)-96.9 °F (36.1 °C)] 96.9 °F (36.1 °C)  Pulse:  [] 92  Resp:  [15-40] 17  SpO2:  [97 %-99 %] 99 %  BP: (147-150)/(72-95) 150/72     Weight: 59 kg (130 lb)  Body mass index is 20.98 kg/m².    Physical Exam  Vitals and nursing note reviewed.   Constitutional:       General: She is not in acute distress.     Appearance: She is ill-appearing.   HENT:      Head: Normocephalic and atraumatic.      Right Ear: External ear normal.      Left Ear: External ear normal.      Nose: No congestion.      Mouth/Throat:      Mouth: Mucous membranes are moist.      Pharynx: Oropharynx is clear.   Eyes:      General:         Right eye: No discharge.         Left  eye: No discharge.      Extraocular Movements: Extraocular movements intact.   Cardiovascular:      Rate and Rhythm: Regular rhythm. Tachycardia present.      Heart sounds: Normal heart sounds. No murmur heard.  Pulmonary:      Effort: Pulmonary effort is normal. No respiratory distress.      Breath sounds: Normal breath sounds. No wheezing or rhonchi.   Abdominal:      General: Bowel sounds are normal. There is no distension.      Palpations: Abdomen is soft.      Tenderness: There is no abdominal tenderness. There is no guarding.   Musculoskeletal:         General: No swelling or tenderness. Normal range of motion.      Cervical back: Normal range of motion. No rigidity or tenderness.   Skin:     General: Skin is warm.      Findings: Bruising (Right lateral abdomen) present. No lesion or rash.   Neurological:      Mental Status: She is easily aroused.      GCS: GCS eye subscore is 3. GCS verbal subscore is 5. GCS motor subscore is 6.      Cranial Nerves: No facial asymmetry.      Motor: Weakness (general weakness) present.   Psychiatric:         Behavior: Behavior is withdrawn.         Cognition and Memory: Memory is impaired.           Significant Labs: All pertinent labs within the past 24 hours have been reviewed.  A1C: No results for input(s): HGBA1C in the last 4320 hours.  Blood Culture: No results for input(s): LABBLOO in the last 48 hours.  CBC:   Recent Labs   Lab 03/12/22  1748   WBC 14.77*   HGB 14.7   HCT 42.0        CMP:   Recent Labs   Lab 03/12/22  1748   *   K 5.1   CL 92*   CO2 18*   *   BUN 64*   CREATININE 3.4*   CALCIUM 10.9*   PROT 8.3   ALBUMIN 4.0   BILITOT 0.8   ALKPHOS 52*   AST 50*   ALT 42   ANIONGAP 18*   EGFRNONAA 12*     Cardiac Markers: No results for input(s): CKMB, MYOGLOBIN, BNP, TROPISTAT in the last 48 hours.  Magnesium:   Recent Labs   Lab 03/12/22  1758   MG 2.1     TSH: No results for input(s): TSH in the last 4320 hours.  Urine Culture: No results for  input(s): LABURIN in the last 48 hours.  Urine Studies:   Recent Labs   Lab 03/12/22  1856   COLORU Yellow   APPEARANCEUA Clear   PHUR 5.0   SPECGRAV >=1.030*   PROTEINUA 2+*   GLUCUA 1+*   KETONESU Negative   BILIRUBINUA 1+*   OCCULTUA 2+*   NITRITE Negative   UROBILINOGEN Negative   LEUKOCYTESUR Negative   RBCUA 1   WBCUA 1   BACTERIA Moderate*   HYALINECASTS 10*       Significant Imaging: I have reviewed all pertinent imaging results/findings within the past 24 hours.  X-Ray Ribs 2 View Right  Narrative: EXAMINATION:  XR CHEST AP PORTABLE; XR RIBS 2 VIEW RIGHT    CLINICAL HISTORY:  Sepsis; Injury, unspecified, initial encounter    TECHNIQUE:  Single frontal view of the chest was performed.  Right ribs two views.    COMPARISON:  November 2014.    FINDINGS:  Cardiac silhouette is normal in size.  Lungs are symmetrically expanded.  No evidence of focal consolidative process, pneumothorax, or significant pleural effusion.  No acute osseous abnormality identified.  No acute displaced right-sided rib fractures are identified.  Impression: No acute cardiopulmonary process identified.    No acute right-sided rib fracture seen.    Electronically signed by: Geraldo Stewart MD  Date:    03/12/2022  Time:    19:31  X-Ray Chest AP Portable  Narrative: EXAMINATION:  XR CHEST AP PORTABLE; XR RIBS 2 VIEW RIGHT    CLINICAL HISTORY:  Sepsis; Injury, unspecified, initial encounter    TECHNIQUE:  Single frontal view of the chest was performed.  Right ribs two views.    COMPARISON:  November 2014.    FINDINGS:  Cardiac silhouette is normal in size.  Lungs are symmetrically expanded.  No evidence of focal consolidative process, pneumothorax, or significant pleural effusion.  No acute osseous abnormality identified.  No acute displaced right-sided rib fractures are identified.  Impression: No acute cardiopulmonary process identified.    No acute right-sided rib fracture seen.    Electronically signed by: Geraldo Stewart  MD  Date:    03/12/2022  Time:    19:31  CT Head Without Contrast  Narrative: EXAMINATION:  CT HEAD WITHOUT CONTRAST    CLINICAL HISTORY:  Head trauma, moderate-severe;    TECHNIQUE:  Low dose axial images were obtained through the head.  Coronal and sagittal reformations were also performed. Contrast was not administered.    COMPARISON:  CT head from November 2012.    FINDINGS:  There is mild generalized cerebral volume loss and chronic microvascular ischemic change.  No evidence of acute/recent major vascular distribution cerebral infarction, intraparenchymal hemorrhage, or intra-axial space occupying lesion. The ventricular system is normal in size and configuration with no evidence of hydrocephalus. No effacement of the skull-base cisterns. No abnormal extra-axial fluid collections or blood products. Visualized paranasal sinuses and mastoid air cells are clear. The calvarium shows no significant abnormality.  Impression: No acute intracranial abnormalities identified.    Electronically signed by: Geraldo Stewart MD  Date:    03/12/2022  Time:    19:28

## 2022-03-13 NOTE — ED PROVIDER NOTES
Encounter Date: 3/12/2022    SCRIBE #1 NOTE: I, Radha Valderrama, am scribing for, and in the presence of, Cooper Alexis MD.       History     Chief Complaint   Patient presents with    Altered Mental Status     Pt brought in by EMS c/o aloc x today per family. Pt found on floor at home by family.      Time seen by provider: 6:00 PM    This is a 81 y.o. female with PMHx of DM, HTN and hyperlipidemia who presents with altered mental status. Per EMS, the patient was found down on the floor by a family member. She was covered in feces and urine. It is unknown how long the patient was down for. She has no complaints at this time. The patient lives at home with her . Family members note that the patient and her  drink heavily. The patient's baseline mental status is unknown. This is the extent of the patient's complaints at this time.    The history is provided by the patient.     Review of patient's allergies indicates:  No Known Allergies  Past Medical History:   Diagnosis Date    B12 deficiency anemia     Calcium oxalate renal stones     Cancer     colon cancer    DCIS (ductal carcinoma in situ)     left    Diabetes mellitus     Hyperlipidemia     Hypertension      Past Surgical History:   Procedure Laterality Date    APPENDECTOMY      BREAST SURGERY      breast lumpectomy    CHOLECYSTECTOMY      COLON SURGERY      colon resection    HYSTERECTOMY       History reviewed. No pertinent family history.  Social History     Tobacco Use    Smoking status: Passive Smoke Exposure - Never Smoker   Substance Use Topics    Alcohol use: Yes     Alcohol/week: 28.0 standard drinks     Types: 28 Cans of beer per week     Comment: 4 beers a day    Drug use: No     Review of Systems   Constitutional: Negative for chills and fever.   HENT: Negative for congestion and sore throat.    Eyes: Negative for photophobia and redness.   Respiratory: Negative for cough and shortness of breath.    Cardiovascular:  Negative for chest pain.   Gastrointestinal: Negative for abdominal pain, nausea and vomiting.   Genitourinary: Negative for dysuria.   Musculoskeletal: Negative for back pain.   Skin: Negative for rash.   Neurological: Negative for weakness, light-headedness and headaches.   Psychiatric/Behavioral: Positive for confusion.       Physical Exam     Initial Vitals   BP Pulse Resp Temp SpO2   03/12/22 1743 03/12/22 1725 03/12/22 1741 03/12/22 1725 03/12/22 1831   (!) 147/95 102 (!) 40 (!) 95.5 °F (35.3 °C) 97 %      MAP       --                Physical Exam    Nursing note and vitals reviewed.  Constitutional: She appears well-developed and well-nourished. She is not diaphoretic. No distress.   HENT:   Head: Normocephalic and atraumatic.   Mouth/Throat: Mucous membranes are normal.   Sticky mucous membranes.   Eyes: EOM are normal. Pupils are equal, round, and reactive to light. No scleral icterus.   Conjunctiva are pink.   Neck: Neck supple.   Normal range of motion.  Cardiovascular: Regular rhythm, S1 normal and S2 normal. Tachycardia present.    No murmur heard.  Pulmonary/Chest: Breath sounds normal. No respiratory distress.   Abdominal: Abdomen is soft. Bowel sounds are normal. There is no abdominal tenderness.   Genitourinary: Rectum:      Guaiac result positive.   Guaiac positive stool. : Acceptable.  Musculoskeletal:         General: No tenderness or edema. Normal range of motion.      Cervical back: Normal range of motion and neck supple.     Lymphadenopathy:     She has no cervical adenopathy.   Skin: Skin is warm and dry. Capillary refill takes less than 2 seconds. No rash noted. No pallor.   Bruising over the chin and right lateral ribs. Positive skin tinting.   Psychiatric: She has a normal mood and affect. Thought content normal.         ED Course   Procedures  Labs Reviewed   CBC W/ AUTO DIFFERENTIAL - Abnormal; Notable for the following components:       Result Value    WBC 14.77 (*)      Immature Granulocytes 0.6 (*)     Gran # (ANC) 13.0 (*)     Immature Grans (Abs) 0.09 (*)     Lymph # 0.8 (*)     Gran % 87.6 (*)     Lymph % 5.6 (*)     All other components within normal limits   COMPREHENSIVE METABOLIC PANEL - Abnormal; Notable for the following components:    Sodium 128 (*)     Chloride 92 (*)     CO2 18 (*)     Glucose 224 (*)     BUN 64 (*)     Creatinine 3.4 (*)     Calcium 10.9 (*)     Alkaline Phosphatase 52 (*)     AST 50 (*)     Anion Gap 18 (*)     eGFR if  14 (*)     eGFR if non  12 (*)     All other components within normal limits   URINALYSIS, REFLEX TO URINE CULTURE - Abnormal; Notable for the following components:    Specific Gravity, UA >=1.030 (*)     Protein, UA 2+ (*)     Glucose, UA 1+ (*)     Bilirubin (UA) 1+ (*)     Occult Blood UA 2+ (*)     All other components within normal limits    Narrative:     Specimen Source->Urine   PROCALCITONIN - Abnormal; Notable for the following components:    Procalcitonin 0.51 (*)     All other components within normal limits   BETA - HYDROXYBUTYRATE, SERUM - Abnormal; Notable for the following components:    Beta-Hydroxybutyrate 2.8 (*)     All other components within normal limits   PHOSPHORUS - Abnormal; Notable for the following components:    Phosphorus 6.0 (*)     All other components within normal limits   URINALYSIS MICROSCOPIC - Abnormal; Notable for the following components:    Bacteria Moderate (*)     Hyaline Casts, UA 10 (*)     All other components within normal limits    Narrative:     Specimen Source->Urine   CK - Abnormal; Notable for the following components:     (*)     All other components within normal limits   POCT GLUCOSE - Abnormal; Notable for the following components:    POCT Glucose 236 (*)     All other components within normal limits   POCT GLUCOSE - Abnormal; Notable for the following components:    POCT Glucose 240 (*)     All other components within normal limits    INFLUENZA A & B BY MOLECULAR   LACTIC ACID, PLASMA   ALCOHOL,MEDICAL (ETHANOL)   MAGNESIUM   CK   TOXICOLOGY SCREEN, URINE, RANDOM (COMPLIANCE)    Narrative:     Specimen Source->Urine   LACTIC ACID, PLASMA   SARS-COV-2 RDRP GENE   POCT INFLUENZA A/B MOLECULAR   POCT GLUCOSE, HAND-HELD DEVICE   TYPE & SCREEN     EKG Readings: (Independently Interpreted)   Initial Reading: No STEMI. Rhythm: Sinus Tachycardia. Heart Rate: 102. Conduction: 1st Degree AV Block.   Chronic left fascicular block.     ECG Results          EKG 12-lead (Final result)  Result time 03/14/22 08:27:29    Final result by Interface, Lab In [facility] (03/14/22 08:27:29)                 Narrative:    Test Reason : R00.0,    Vent. Rate : 102 BPM     Atrial Rate : 102 BPM     P-R Int : 228 ms          QRS Dur : 100 ms      QT Int : 332 ms       P-R-T Axes : 073 -46 104 degrees     QTc Int : 432 ms    Sinus tachycardia with 1st degree A-V block  Left anterior fascicular block  Voltage criteria for left ventricular hypertrophy  ST and T wave abnormality, consider lateral ischemia  Abnormal ECG    Confirmed by Adrian Hernández MD ([id number]) on 3/14/2022 8:27:19 AM    Referred By: [email or web address]   SELF           Confirmed By:Adrian Hernández MD                            Imaging Results          X-Ray Ribs 2 View Right (Final result)  Result time 03/12/22 19:31:59    Final result by Geraldo Stewart MD (03/12/22 19:31:59)                 Impression:      No acute cardiopulmonary process identified.    No acute right-sided rib fracture seen.      Electronically signed by: Geraldo Stewart MD  Date:    03/12/2022  Time:    19:31             Narrative:    EXAMINATION:  XR CHEST AP PORTABLE; XR RIBS 2 VIEW RIGHT    CLINICAL HISTORY:  Sepsis; Injury, unspecified, initial encounter    TECHNIQUE:  Single frontal view of the chest was performed.  Right ribs two views.    COMPARISON:  November 2014.    FINDINGS:  Cardiac silhouette is normal in size.  Lungs are  symmetrically expanded.  No evidence of focal consolidative process, pneumothorax, or significant pleural effusion.  No acute osseous abnormality identified.  No acute displaced right-sided rib fractures are identified.                               X-Ray Chest AP Portable (Final result)  Result time 03/12/22 19:31:59    Final result by Geraldo Stewart MD (03/12/22 19:31:59)                 Impression:      No acute cardiopulmonary process identified.    No acute right-sided rib fracture seen.      Electronically signed by: Geraldo Stewart MD  Date:    03/12/2022  Time:    19:31             Narrative:    EXAMINATION:  XR CHEST AP PORTABLE; XR RIBS 2 VIEW RIGHT    CLINICAL HISTORY:  Sepsis; Injury, unspecified, initial encounter    TECHNIQUE:  Single frontal view of the chest was performed.  Right ribs two views.    COMPARISON:  November 2014.    FINDINGS:  Cardiac silhouette is normal in size.  Lungs are symmetrically expanded.  No evidence of focal consolidative process, pneumothorax, or significant pleural effusion.  No acute osseous abnormality identified.  No acute displaced right-sided rib fractures are identified.                               CT Head Without Contrast (Final result)  Result time 03/12/22 19:28:33    Final result by Geraldo Stewart MD (03/12/22 19:28:33)                 Impression:      No acute intracranial abnormalities identified.      Electronically signed by: Geraldo Stewart MD  Date:    03/12/2022  Time:    19:28             Narrative:    EXAMINATION:  CT HEAD WITHOUT CONTRAST    CLINICAL HISTORY:  Head trauma, moderate-severe;    TECHNIQUE:  Low dose axial images were obtained through the head.  Coronal and sagittal reformations were also performed. Contrast was not administered.    COMPARISON:  CT head from November 2012.    FINDINGS:  There is mild generalized cerebral volume loss and chronic microvascular ischemic change.  No evidence of acute/recent major vascular distribution  cerebral infarction, intraparenchymal hemorrhage, or intra-axial space occupying lesion. The ventricular system is normal in size and configuration with no evidence of hydrocephalus. No effacement of the skull-base cisterns. No abnormal extra-axial fluid collections or blood products. Visualized paranasal sinuses and mastoid air cells are clear. The calvarium shows no significant abnormality.                              X-Rays:   Independently Interpreted Readings:   Chest X-Ray: No enlarged cardiac silhouette. No consolidations. No pulmonary edema.   Other Readings:  Right Ribs X-Ray: No enlarged cardiac silhouette. No consolidations. No pulmonary edema. No fractures.    Medications   0.9%  NaCl infusion ( Intravenous New Bag 3/17/22 0624)   acetaminophen tablet 650 mg (has no administration in time range)   polyethylene glycol packet 17 g (has no administration in time range)   ondansetron injection 4 mg (has no administration in time range)   insulin aspart U-100 pen 0-5 Units (1 Units Subcutaneous Given 3/16/22 2154)   glucose chewable tablet 16 g (has no administration in time range)   glucose chewable tablet 24 g (has no administration in time range)   glucagon (human recombinant) injection 1 mg (has no administration in time range)   dextrose 10% bolus 125 mL (has no administration in time range)   dextrose 10% bolus 250 mL (has no administration in time range)   vancomycin - pharmacy to dose (has no administration in time range)   metoprolol tartrate (LOPRESSOR) tablet 25 mg (25 mg Oral Given 3/17/22 0804)   mupirocin 2 % ointment ( Nasal Given 3/17/22 0806)   losartan tablet 25 mg (25 mg Oral Given 3/17/22 0804)   magnesium oxide tablet 400 mg (400 mg Oral Given 3/17/22 0804)   sodium chloride 0.9% bolus 1,770 mL (0 mL/kg × 59 kg Intravenous Stopped 3/12/22 2058)   vancomycin in dextrose 5 % 1 gram/250 mL IVPB 1,000 mg (1,000 mg Intravenous New Bag 3/12/22 1929)   vancomycin in dextrose 5 % 1 gram/250 mL  IVPB 1,000 mg (0 mg Intravenous Stopped 3/14/22 0235)   vancomycin 1.25 g in dextrose 5% 250 mL IVPB (ready to mix) (0 mg Intravenous Stopped 3/15/22 1146)   vancomycin in dextrose 5 % 1 gram/250 mL IVPB 1,000 mg (0 mg Intravenous Stopped 3/16/22 1256)     Medical Decision Making:   History:   Old Medical Records: I decided to obtain old medical records.  Independently Interpreted Test(s):   I have ordered and independently interpreted X-rays - see prior notes.  I have ordered and independently interpreted EKG Reading(s) - see prior notes  Clinical Tests:   Lab Tests: Ordered and Reviewed  Radiological Study: Ordered and Reviewed  Medical Tests: Reviewed and Ordered          Scribe Attestation:   Scribe #1: I performed the above scribed service and the documentation accurately describes the services I performed. I attest to the accuracy of the note.    Attending Attestation:             Attending ED Notes:   Emergent evaluation of 81-year-old female who presents to the emergency department by EMS after none being found down in her house.  Patient is afebrile, nontoxic-appearing stable vital signs except for elevation of blood pressure.  Patient is confused.  Unknown baseline mental status.  COVID screen is negative.  Influenza screen is negative.  Urinary analysis reveals urinary tract infection.  Beta hydroxybutyrate is 2.8.  CPK is 833. Patient's white blood cell count of 89953.  H&H within normal limits.  Sodium of 128, Cl of 92. CO2 of 18 and BUN and creatinine are 64 and 3.4.  Calcium is 9.9.  Patient found to have bruising on the right lateral rib area.  No acute findings on x-ray of right ribs.  No acute findings on portable chest x-ray.  No acute findings on CT of the head.  The patient is extensively counseled on her diagnosis and treatment including all diagnostic, laboratory and physical exam findings.  The patient is admitted in stable condition.      ED Course as of 03/17/22 0807   Sat Mar 12, 2022    1957 7:58 PM  Discussed and consulted patient including all laboratory and physical exam findings with the moonlighter. Patient will be admitted to Dr. Dunham. [MG]      ED Course User Index  [MG] Radha Valderrama          Physician Attestation for Scribe: I, Cooper Alexis, reviewed documentation as scribed in my presence, which is both accurate and complete.    Clinical Impression:   Final diagnoses:  [R00.0] Tachycardia  [T14.90XA] Trauma  [R41.82] Altered mental status, unspecified altered mental status type (Primary)  [S20.211A] Contusion of ribs, right, initial encounter  [S09.90XA] Injury of head, initial encounter  [N17.9] BREE (acute kidney injury)  [E86.0] Dehydration  [N30.01] Acute cystitis with hematuria  [R80.9] Proteinuria, unspecified type  [E87.1] Hyponatremia  [E83.52] Hypercalcemia          ED Disposition Condition    Admit               Cooper Alexis MD  03/17/22 0807

## 2022-03-13 NOTE — PROGRESS NOTES
Pharmacokinetic Initial Assessment: IV Vancomycin    Assessment/Plan:    Initiate intravenous vancomycin with loading dose of 1000 mg once with subsequent doses when random concentrations are less than 20 mcg/mL   Desired empiric serum trough concentration is 10 to 20 mcg/mL  Draw vancomycin random level on 3/13/22 at 1930.  Pharmacy will continue to follow and  monitor vancomycin.      Please contact pharmacy at extension [phone] with any questions regarding this assessment.     Thank you for the consult,   Quynh Damon       Patient brief summary:  Pearl Villafana is a 81 y.o. female initiated on antimicrobial therapy with IV Vancomycin for treatment of suspected bacteremia    Drug Allergies:   Review of patient's allergies indicates:  No Known Allergies    Actual Body Weight:   59 kg    Renal Function:   Estimated Creatinine Clearance: 12.3 mL/min (A) (based on SCr of 3.4 mg/dL (H)).,     Dialysis Method (if applicable):  N/A    CBC (last 72 hours):  Recent Labs   Lab Result Units 03/12/22  1748   WBC K/uL 14.77*   Hemoglobin g/dL 14.7   Hematocrit % 42.0   Platelets K/uL 272   Gran % % 87.6*   Lymph % % 5.6*   Mono % % 6.0   Eosinophil % % 0.0   Basophil % % 0.2   Differential Method  Automated       Metabolic Panel (last 72 hours):  Recent Labs   Lab Result Units 03/12/22  1748 03/12/22  1758 03/12/22  1856 03/12/22  2055   Sodium mmol/L 128*  --   --   --    Potassium mmol/L 5.1  --   --   --    Chloride mmol/L 92*  --   --   --    CO2 mmol/L 18*  --   --   --    Glucose mg/dL 224*  --   --   --    Glucose, UA   --   --  1+*  --    BUN mg/dL 64*  --   --   --    Creatinine mg/dL 3.4*  --   --   --    Creatinine, Urine mg/dL  --   --   --  162.0   Albumin g/dL 4.0  --   --   --    Total Bilirubin mg/dL 0.8  --   --   --    Alkaline Phosphatase U/L 52*  --   --   --    AST U/L 50*  --   --   --    ALT U/L 42  --   --   --    Magnesium mg/dL  --  2.1  --   --    Phosphorus mg/dL  --  6.0*  --   --        Drug  levels (last 3 results):  No results for input(s): VANCOMYCINRA, VANCOMYCINPE, VANCOMYCINTR in the last 72 hours.    Microbiologic Results:  Microbiology Results (last 7 days)     Procedure Component Value Units Date/Time    Urine Culture High Risk [[id number]] Collected: 03/13/22 0028    Order Status: Sent Specimen: Urine, Catheterized Updated: 03/13/22 0039    Blood culture x two cultures. Draw prior to antibiotics. [[id number]] Collected: 03/12/22 1749    Order Status: Sent Specimen: Blood from Peripheral, Upper Arm, Right Updated: 03/12/22 2114    Blood culture x two cultures. Draw prior to antibiotics. [[id number]] Collected: 03/12/22 1755    Order Status: Sent Specimen: Blood from Peripheral, Hand, Left Updated: 03/12/22 2114    Influenza A & B by Molecular [[id number]] Collected: 03/12/22 1902    Order Status: Sent Specimen: Nasopharyngeal Swab Updated: 03/12/22 1902

## 2022-03-13 NOTE — PLAN OF CARE
"A&Ox4. Pt family visited during the shift. As per dtr, "pt is non compliant and has a Spouse and both alcoholic. Drinks until she pass outs." No N&V or treamor. Weak on bilateral upper and lower extremities. Still complains of back pain upon rolling. Had 2 loose BM. Please, follow up for 3rd loose BM for C-diff protocol.     Pt was able to engage in conversation and ask questions. Pt vitals are stable. Diet adjusted to pureed because of no dentures available. Swallows food and liquid okay. Needs to be feed because of weakness.     Has redness in the back. Moisture barrier applied. Zayas in place. No other distress at this moment. Still getting [id number]/hr for hydration.   Pt is sitting up in bed watching television. Bed in lowest  Position, call light in reach, side rails up x 2. Will continue to monitor.  "

## 2022-03-13 NOTE — ASSESSMENT & PLAN NOTE
Sodium is 128. Patient has a history of hyponatremia due to alcoholism.  Dehydration also playing a role.  Will check urine osmolality.

## 2022-03-13 NOTE — PROGRESS NOTES
Pharmacist Renal Dose Adjustment Note    Pearl Villafana is a 81 y.o. female being treated with the medication Zosyn    Patient Data:    Vital Signs (Most Recent):  Temp: (!) 95.5 °F (35.3 °C) (03/12/22 1725)  Pulse: 104 (03/12/22 1745)  Resp: (!) 24 (03/12/22 1745)  BP: (!) 147/95 (03/12/22 1743)   Vital Signs (72h Range):  Temp:  [95.5 °F (35.3 °C)]   Pulse:  [102-116]   Resp:  [24-40]   BP: (147)/(95)      Recent Labs   Lab 03/12/22 1748   CREATININE 3.4*     Serum creatinine: 3.4 mg/dL (H) 03/12/22 1748  Estimated creatinine clearance: 12.3 mL/min (A)    Medication:zosyn dose: 4.5 g frequency q8h will be changed to medication:Zosyn dose:4.5 g frequency:q12h    Pharmacist's Name: Germania Rubin  Pharmacist's Extension: [phone]

## 2022-03-13 NOTE — ASSESSMENT & PLAN NOTE
Chronic.  Patient's med list consists of losartan 25 mg daily metoprolol 25 mg b.i.d..  Will resume metoprolol

## 2022-03-13 NOTE — ASSESSMENT & PLAN NOTE
Patient takes metformin 500 mg daily at home per chart.  Hold metformin at this time.  A1c pending.  Low-dose correction sliding scale started.

## 2022-03-14 PROBLEM — R63.8 INADEQUATE ORAL INTAKE: Status: ACTIVE | Noted: 2022-03-14

## 2022-03-14 PROBLEM — L89.101 DECUBITUS ULCER OF BACK, STAGE 1: Status: ACTIVE | Noted: 2022-03-14

## 2022-03-14 LAB
ANION GAP SERPL CALC-SCNC: 7 MMOL/L (ref 8–16)
AV INDEX (PROSTH): 0.95
AV MEAN GRADIENT: 3 MMHG
AV PEAK GRADIENT: 6 MMHG
AV VALVE AREA: 2.56 CM2
AV VELOCITY RATIO: 0.96
BACTERIA UR CULT: NO GROWTH
BSA FOR ECHO PROCEDURE: 1.79 M2
BUN SERPL-MCNC: 46 MG/DL (ref 8–23)
C DIFF GDH STL QL: NEGATIVE
C DIFF TOX A+B STL QL IA: NEGATIVE
CALCIUM SERPL-MCNC: 8.9 MG/DL (ref 8.7–10.5)
CHLORIDE SERPL-SCNC: 106 MMOL/L (ref 95–110)
CO2 SERPL-SCNC: 18 MMOL/L (ref 23–29)
CREAT SERPL-MCNC: 1.3 MG/DL (ref 0.5–1.4)
CV ECHO LV RWT: 0.47 CM
DOP CALC AO PEAK VEL: 1.21 M/S
DOP CALC AO VTI: 22.58 CM
DOP CALC LVOT AREA: 2.7 CM2
DOP CALC LVOT DIAMETER: 1.85 CM
DOP CALC LVOT PEAK VEL: 1.16 M/S
DOP CALC LVOT STROKE VOLUME: 57.87 CM3
DOP CALCLVOT PEAK VEL VTI: 21.54 CM
E WAVE DECELERATION TIME: 362.21 MSEC
E/A RATIO: 0.9
E/E' RATIO: 7.71 M/S
ECHO LV POSTERIOR WALL: 0.86 CM (ref 0.6–1.1)
EJECTION FRACTION: 65 %
ERYTHROCYTE [DISTWIDTH] IN BLOOD BY AUTOMATED COUNT: 13.8 % (ref 11.5–14.5)
EST. GFR  (AFRICAN AMERICAN): 45 ML/MIN/1.73 M^2
EST. GFR  (NON AFRICAN AMERICAN): 39 ML/MIN/1.73 M^2
FRACTIONAL SHORTENING: 46 % (ref 28–44)
GLUCOSE SERPL-MCNC: 193 MG/DL (ref 70–110)
HCT VFR BLD AUTO: 33.3 % (ref 37–48.5)
HGB BLD-MCNC: 11.1 G/DL (ref 12–16)
INTERVENTRICULAR SEPTUM: 0.9 CM (ref 0.6–1.1)
IVRT: 91.34 MSEC
LA MAJOR: 5.29 CM
LA MINOR: 5.28 CM
LA WIDTH: 2.66 CM
LEFT ATRIUM SIZE: 2.77 CM
LEFT ATRIUM VOLUME INDEX MOD: 16.9 ML/M2
LEFT ATRIUM VOLUME INDEX: 18.7 ML/M2
LEFT ATRIUM VOLUME MOD: 30 CM3
LEFT ATRIUM VOLUME: 33.1 CM3
LEFT INTERNAL DIMENSION IN SYSTOLE: 1.95 CM (ref 2.1–4)
LEFT VENTRICLE DIASTOLIC VOLUME INDEX: 31.49 ML/M2
LEFT VENTRICLE DIASTOLIC VOLUME: 55.73 ML
LEFT VENTRICLE MASS INDEX: 52 G/M2
LEFT VENTRICLE SYSTOLIC VOLUME INDEX: 6.7 ML/M2
LEFT VENTRICLE SYSTOLIC VOLUME: 11.88 ML
LEFT VENTRICULAR INTERNAL DIMENSION IN DIASTOLE: 3.64 CM (ref 3.5–6)
LEFT VENTRICULAR MASS: 91.48 G
LV LATERAL E/E' RATIO: 6.75 M/S
LV SEPTAL E/E' RATIO: 9 M/S
MAGNESIUM SERPL-MCNC: 1.7 MG/DL (ref 1.6–2.6)
MCH RBC QN AUTO: 29.6 PG (ref 27–31)
MCHC RBC AUTO-ENTMCNC: 33.3 G/DL (ref 32–36)
MCV RBC AUTO: 89 FL (ref 82–98)
MV PEAK A VEL: 0.6 M/S
MV PEAK E VEL: 0.54 M/S
MV STENOSIS PRESSURE HALF TIME: 84.29 MS
MV VALVE AREA P 1/2 METHOD: 2.61 CM2
OB PNL STL: NEGATIVE
PHOSPHATE SERPL-MCNC: 2.1 MG/DL (ref 2.7–4.5)
PISA MRMAX VEL: 0.03 M/S
PISA TR MAX VEL: 2.02 M/S
PLATELET # BLD AUTO: 162 K/UL (ref 150–450)
PMV BLD AUTO: 10.5 FL (ref 9.2–12.9)
POCT GLUCOSE: 177 MG/DL (ref 70–110)
POCT GLUCOSE: 244 MG/DL (ref 70–110)
POCT GLUCOSE: 268 MG/DL (ref 70–110)
POTASSIUM SERPL-SCNC: 4.5 MMOL/L (ref 3.5–5.1)
PULM VEIN S/D RATIO: 1.05
PV PEAK D VEL: 0.41 M/S
PV PEAK S VEL: 0.43 M/S
PV PEAK VELOCITY: 0.67 CM/S
RA MAJOR: 4.5 CM
RA WIDTH: 3.56 CM
RBC # BLD AUTO: 3.75 M/UL (ref 4–5.4)
SINUS: 2.9 CM
SODIUM SERPL-SCNC: 131 MMOL/L (ref 136–145)
TDI LATERAL: 0.08 M/S
TDI SEPTAL: 0.06 M/S
TDI: 0.07 M/S
TR MAX PG: 16 MMHG
WBC # BLD AUTO: 9.9 K/UL (ref 3.9–12.7)

## 2022-03-14 PROCEDURE — 97165 OT EVAL LOW COMPLEX 30 MIN: CPT

## 2022-03-14 PROCEDURE — 99232 PR SUBSEQUENT HOSPITAL CARE,LEVL II: ICD-10-PCS | Mod: ,,, | Performed by: INTERNAL MEDICINE

## 2022-03-14 PROCEDURE — 83735 ASSAY OF MAGNESIUM: CPT | Performed by: NURSE PRACTITIONER

## 2022-03-14 PROCEDURE — 87449 NOS EACH ORGANISM AG IA: CPT | Performed by: INTERNAL MEDICINE

## 2022-03-14 PROCEDURE — 36415 COLL VENOUS BLD VENIPUNCTURE: CPT | Performed by: NURSE PRACTITIONER

## 2022-03-14 PROCEDURE — 97530 THERAPEUTIC ACTIVITIES: CPT

## 2022-03-14 PROCEDURE — 63600175 PHARM REV CODE 636 W HCPCS: Performed by: NURSE PRACTITIONER

## 2022-03-14 PROCEDURE — 85027 COMPLETE CBC AUTOMATED: CPT | Performed by: NURSE PRACTITIONER

## 2022-03-14 PROCEDURE — 99232 SBSQ HOSP IP/OBS MODERATE 35: CPT | Mod: ,,, | Performed by: INTERNAL MEDICINE

## 2022-03-14 PROCEDURE — 21400001 HC TELEMETRY ROOM

## 2022-03-14 PROCEDURE — 25000003 PHARM REV CODE 250: Performed by: INTERNAL MEDICINE

## 2022-03-14 PROCEDURE — 63600175 PHARM REV CODE 636 W HCPCS: Performed by: INTERNAL MEDICINE

## 2022-03-14 PROCEDURE — 80048 BASIC METABOLIC PNL TOTAL CA: CPT | Performed by: NURSE PRACTITIONER

## 2022-03-14 PROCEDURE — 27000207 HC ISOLATION

## 2022-03-14 PROCEDURE — 84100 ASSAY OF PHOSPHORUS: CPT | Performed by: NURSE PRACTITIONER

## 2022-03-14 PROCEDURE — 25000003 PHARM REV CODE 250: Performed by: NURSE PRACTITIONER

## 2022-03-14 PROCEDURE — 97110 THERAPEUTIC EXERCISES: CPT

## 2022-03-14 RX ORDER — MUPIROCIN 20 MG/G
OINTMENT TOPICAL 2 TIMES DAILY
Status: DISPENSED | OUTPATIENT
Start: 2022-03-14 | End: 2022-03-19

## 2022-03-14 RX ORDER — VANCOMYCIN HCL IN 5 % DEXTROSE 1G/250ML
1000 PLASTIC BAG, INJECTION (ML) INTRAVENOUS ONCE
Status: COMPLETED | OUTPATIENT
Start: 2022-03-14 | End: 2022-03-14

## 2022-03-14 RX ADMIN — PIPERACILLIN AND TAZOBACTAM 4.5 G: 4; .5 INJECTION, POWDER, LYOPHILIZED, FOR SOLUTION INTRAVENOUS; PARENTERAL at 07:03

## 2022-03-14 RX ADMIN — SODIUM CHLORIDE: 0.9 INJECTION, SOLUTION INTRAVENOUS at 08:03

## 2022-03-14 RX ADMIN — SODIUM CHLORIDE: 0.9 INJECTION, SOLUTION INTRAVENOUS at 11:03

## 2022-03-14 RX ADMIN — VANCOMYCIN HYDROCHLORIDE 1000 MG: 1 INJECTION, POWDER, LYOPHILIZED, FOR SOLUTION INTRAVENOUS at 01:03

## 2022-03-14 RX ADMIN — MUPIROCIN: 20 OINTMENT TOPICAL at 08:03

## 2022-03-14 RX ADMIN — METOPROLOL TARTRATE 25 MG: 25 TABLET, FILM COATED ORAL at 08:03

## 2022-03-14 NOTE — PT/OT/SLP PROGRESS
Physical Therapy Treatment    Patient Name:  Pearl Villafana   MRN:  [id number]    Recommendations:     Discharge Recommendations:  nursing facility, skilled   Discharge Equipment Recommendations: walker, rolling   Barriers to discharge: functional mobility impairments, medical treatment    Assessment:     Pearl Villafana is a 81 y.o. female admitted with a medical diagnosis of Severe sepsis.  She presents with the following impairments/functional limitations:  weakness, impaired endurance, impaired self care skills, impaired functional mobilty, gait instability, impaired balance, decreased upper extremity function, decreased lower extremity function, decreased safety awareness.    Pt tolerated treatment fairly with no adverse reactions. Pt is progressing toward meeting goals. Pt performed bed mobility and therex seated EOB. Demonstrated improved sitting balance. Pt continues to be limited by generalized weakness. PT will continue to follow and progress goals as tolerated. Recommend discharge to SNF.     Rehab Prognosis: Fair; patient would benefit from acute skilled PT services to address these deficits and reach maximum level of function.    Recent Surgery: * No surgery found *      Plan:     During this hospitalization, patient to be seen 5 x/week to address the identified rehab impairments via gait training, therapeutic activities, therapeutic exercises, neuromuscular re-education and progress toward the following goals:    · Plan of Care Expires:  04/13/22    Subjective     Chief Complaint: fatigue / weakness  Patient/Family Comments/goals: Pt interested in sitting EOB, however, not able to attempt to stand at this time due to weakness.   Pain/Comfort:  · Pain Rating 1: 0/10      Objective:     Communicated with OT prior to session.  Patient found HOB elevated with mcdonald catheter, peripheral IV, telemetry upon PT entry to room.     General Precautions: Standard, fall   Orthopedic Precautions:N/A   Braces:  N/A  Respiratory Status: Room air     Functional Mobility:  · Bed Mobility:     · Supine to Sit: minimum assistance    AM-PAC 6 CLICK MOBILITY  Turning over in bed (including adjusting bedclothes, sheets and blankets)?: 2  Sitting down on and standing up from a chair with arms (e.g., wheelchair, bedside commode, etc.): 1  Moving from lying on back to sitting on the side of the bed?: 2  Moving to and from a bed to a chair (including a wheelchair)?: 2  Need to walk in hospital room?: 2  Climbing 3-5 steps with a railing?: 2  Basic Mobility Total Score: 11     Therapeutic Activities and Exercises:  · Bed mobility training as described above.  · Pt performed dynamic sitting balance for ~10 minutes with SPV.   · Long arc quads performed (1x10 bilaterally)     PT educated patient re:   · PT plan of care/role of PT  · Fall and safety precautions  · Use of call light (don't get up without assistance)  Pt verbalized understanding, however, needs reinforcement.     Patient left sitting edge of bed with all lines intact, call button in reach, RN notified and family member present.    GOALS:   Multidisciplinary Problems     Physical Therapy Goals        Problem: Physical Therapy Goal    Goal Priority Disciplines Outcome Goal Variances Interventions   Physical Therapy Goal     PT, PT/OT Ongoing, Progressing     Description: Goals to be met by: 2022    Patient will increase functional independence with mobility by performin. Pt will be able to transfer supine to sit with SBA  2. Sit<>stand with CGA with RW.  3. Gait x 50 feet with CGA with RW.                        Time Tracking:     PT Received On: 22  PT Start Time: 1355     PT Stop Time: 1414  PT Total Time (min): 19 min     Billable Minutes: Therapeutic Activity 19    Treatment Type: Treatment  PT/PTA: PT     PTA Visit Number: 0     2022

## 2022-03-14 NOTE — PT/OT/SLP EVAL
Occupational Therapy   Evaluation & Treatment    Name: Pearl Villafana  MRN: [id number]  Admitting Diagnosis:  Severe sepsis  Recent Surgery: * No surgery found *      Recommendations:     Discharge Recommendations: nursing facility, skilled  Discharge Equipment Recommendations:   (TBD pending progress)  Barriers to discharge:  None    Assessment:     Pearl Villafana is a 81 y.o. female with a medical diagnosis of Severe sepsis.  She presents with no pain. Performance deficits affecting function: weakness, impaired endurance, impaired functional mobilty, decreased upper extremity function, decreased lower extremity function, impaired self care skills, impaired balance, decreased safety awareness.  Pt required multiple attempts and increased encouragement to participate in therapy this date.  Pt demonstrates strength and ROM in (B) UE needed for ADLs that is WFL.  Pt able to perform bed mobility with Min A.  While seated at EOB pt able to maintain balance with supervision.  Pt declined attempting sit <> stand transfer this date despite max encouragement and education regarding importance and purpose of activity.  Some repetition required for reinforcement of ideas.    Overall, pt tolerated therapy well and was able to perform UB exercises with no signs of distress.  PTA pt reports being (I) with ADLs (until recently with bathing) and mobility.  Pt is not at PLOF and would benefit from skilled OT services to address problems listed above and increase independence with ADLs.  SNF is recommended upon d/c from acute care to further address deficits and help pt improve overall functional independence.       Rehab Prognosis: Good; patient would benefit from acute skilled OT services to address these deficits and reach maximum level of function.       Plan:     Patient to be seen   to address the above listed problems via self-care/home management, therapeutic activities, therapeutic exercises  · Plan of Care Expires:  "04/13/22  · Plan of Care Reviewed with: patient    Subjective     Pt stated several times during session, "I don't think I can stand."  OT and PT provided encouragement and explained how they could provide assist.  Despite max encouragement pt declined activity stating, "maybe tomorrow."    Chief Complaint: Fatigue  Patient/Family Comments/goals: Resume PLOF    Occupational Profile:  Information taken from OT note dated 3/13:  Living Environment: Lives with spouse in 2SH with 0 HAL and bedroom/bathroom on 1st level; bathroom setup as tub/shower and standard toilet.   Previous level of function:   -ADLs/iADLS: Reports 1 month ago she was able to take sit down baths and also would go to grocery store via lift.  Just PTA Pt. Reports only taking sink baths as she is unable to get into/out of tub and no longer grocery shopping.  Does simple meal prep and has recently been eating canned goods for meals.  -Mobility: Independent  Roles and Routines: Did not report specific hobbies/routines  Equipment Used at Home:  none  Assistance upon Discharge: Family able to provide some assist      Pain/Comfort:  · Pain Rating 1: 0/10  · Pain Rating Post-Intervention 1: 0/10  · Pain Rating Post-Intervention 2: 0/10    Patients cultural, spiritual, Moravian conflicts given the current situation: no    Objective:     Communicated with: RN (MD) prior to session.  Patient found HOB elevated with mcdonald catheter, peripheral IV, telemetry upon OT entry to room.  Family member present during session.    General Precautions: Standard, fall   Orthopedic Precautions:N/A   Braces: N/A  Respiratory Status: Room air    Occupational Performance:    Bed Mobility:    · Supine <> sit: Min A  · Scooting: SBA-CGA seated towards EOB  · Increased time and cues required    Functional Mobility/Transfers:  · Sit <> Stand: Unable to assess 2* pt declined attempting activity  · Functional Mobility: To be assessed in upcoming sessions    Activities of Daily " Living:  · Upper Body Dressing: minimum assistance for adjusting gown and pulling upwards while seated at EOB.    Cognitive/Visual Perceptual:  Cognitive/Psychosocial Skills:    -       Oriented to: Person, Place, Time and Situation   -       Follows Commands/attention: Follows two-step commands.     -Required some repetition for reinforcement of ideas.   -Appeared to require increased time for processing  -       Communication: clear/fluent  -       Memory: No Deficits noted  -       Safety awareness/insight to disability: impaired   -       Mood/Affect/Coping skills/emotional control: Cooperative    Physical Exam:  Postural examination/scapula alignment:    -       No postural abnormalities identified  Skin integrity: Visible skin intact  Edema:  None noted  Sensation: -       Intact  Motor Planning: -       WFL  Dominant hand: Right  Upper Extremity Range of Motion:    -       Right Upper Extremity: WFL  -       Left Upper Extremity: WFL  Upper Extremity Strength:   -       Right Upper Extremity: WFL; grossly 4/5 all muscle groups  -       Left Upper Extremity: WFL; grossly 4/5 all muscle groups   Strength: 4/5 both hands  Fine Motor Coordination: Intact  Gross motor coordination: WFL  Balance:  Sitting- Supervision; Standing- Unable to assess    AMPAC 6 Click ADL:  AMPAC Total Score: 14    Treatment & Education:  *Pt educated on role of OT in acute care setting, and importance/purpose of EOB/OOB activity  *Pt performed UB exercises to provide stretch and promote increased endurance needed for ADLs: 2 sets x 10 reps per exercise seated at EOB  -horizontal shoulder abduction/adduction  -shoulder flexion  *POC reviewed with pt and family member; therapists instructed pt to utilize call button when she is ready to lie down  Education:    Patient left sitting at EOB with lunch tray set up; all lines intact, call button in reach, RN (MD) notified and family member present   *pt requested to remain seated at  EOB    GOALS:   Multidisciplinary Problems     Occupational Therapy Goals        Problem: Occupational Therapy Goal    Goal Priority Disciplines Outcome Interventions   Occupational Therapy Goal     OT, PT/OT Ongoing, Progressing    Description: Goals to be met by: 3/21/2022    Patient will increase functional independence with ADLs by performing:    UE Dressing with Minimal Assistance.  LE Dressing with Maximum Assistance.  Grooming while standing at sink with Minimal Assistance.  Toileting from toilet with Minimal Assistance for hygiene and clothing management.   Toilet transfer to toilet with Minimal Assistance.  Step transfer with Minimal Assistance.                     History:     Past Medical History:   Diagnosis Date    B12 deficiency anemia     Calcium oxalate renal stones     Cancer     colon cancer    DCIS (ductal carcinoma in situ)     left    Diabetes mellitus     Hyperlipidemia     Hypertension        Past Surgical History:   Procedure Laterality Date    APPENDECTOMY      BREAST SURGERY      breast lumpectomy    CHOLECYSTECTOMY      COLON SURGERY      colon resection    HYSTERECTOMY         Time Tracking:     OT Date of Treatment: 03/14/22  OT Start Time: 1355  OT Stop Time: 1415  OT Total Time (min): 20 min    Billable Minutes:Evaluation 12  Therapeutic Exercise 8    3/14/2022

## 2022-03-14 NOTE — PLAN OF CARE
Recommendations    1.Recommend SLP consult- to help with correct modifications for diet.   2.Continue Boost Plus TID.   3.Encourage good PO intake.   4. Recommend Boost Pudding BID as tolerated.    Goals: Pt to advance diet with texture/ consistency as tolerated with ONS by RD f/u.  Nutrition Goal Status: new  Communication of RD Recs:  (POC)

## 2022-03-14 NOTE — PROGRESS NOTES
Pharmacokinetic Assessment Follow Up: IV Vancomycin    Vancomycin serum concentration assessment(s):    The random level was drawn correctly and can be used to guide therapy at this time. The measurement is below the desired definitive target range of 10 to 20 mcg/mL.    Vancomycin Regimen Plan:    Administer vancomycin 1 g IV now on 3/14/22 at 0130. Re-dose when the random level is less than 20 mcg/mL, next level to be drawn at 0430 on 3/15/22    Drug levels (last 3 results):  Recent Labs   Lab Result Units 03/13/22  2300   Vancomycin, Random ug/mL 5.4       Pharmacy will continue to follow and monitor vancomycin.    Please contact pharmacy at extension [phone] for questions regarding this assessment.    Thank you for the consult,   Quynh Damon       Patient brief summary:  Pearl Villafana is a 81 y.o. female initiated on antimicrobial therapy with IV Vancomycin for treatment of bacteremia    The patient's current regimen is pulse dosing    Drug Allergies:   Review of patient's allergies indicates:  No Known Allergies    Actual Body Weight:   68.5 kg    Renal Function:   Estimated Creatinine Clearance: 21 mL/min (A) (based on SCr of 2 mg/dL (H)).,     Dialysis Method (if applicable):  N/A    CBC (last 72 hours):  Recent Labs   Lab Result Units 03/12/22 1748 03/12/22  2153 03/13/22  0806   WBC K/uL 14.77*  --  10.71   Hemoglobin g/dL 14.7  --  12.4   Hemoglobin A1C %  --  6.6*  --    Hematocrit % 42.0  --  36.1*   Platelets K/uL 272  --  197   Gran % % 87.6*  --   --    Lymph % % 5.6*  --   --    Mono % % 6.0  --   --    Eosinophil % % 0.0  --   --    Basophil % % 0.2  --   --    Differential Method  Automated  --   --        Metabolic Panel (last 72 hours):  Recent Labs   Lab Result Units 03/12/22  1748 03/12/22  1758 03/12/22  1856 03/12/22  2055 03/13/22  0806   Sodium mmol/L 128*  --   --   --  130*   Potassium mmol/L 5.1  --   --   --  4.7   Chloride mmol/L 92*  --   --   --  102   CO2 mmol/L 18*  --   --    --  16*   Glucose mg/dL 224*  --   --   --  203*   Glucose, UA   --   --  1+*  --   --    BUN mg/dL 64*  --   --   --  61*   Creatinine mg/dL 3.4*  --   --   --  2.0*   Creatinine, Urine mg/dL  --   --   --  162.0  --    Albumin g/dL 4.0  --   --   --   --    Total Bilirubin mg/dL 0.8  --   --   --   --    Alkaline Phosphatase U/L 52*  --   --   --   --    AST U/L 50*  --   --   --   --    ALT U/L 42  --   --   --   --    Magnesium mg/dL  --  2.1  --   --  1.8   Phosphorus mg/dL  --  6.0*  --   --  3.4       Vancomycin Administrations:  vancomycin given in the last 96 hours                   vancomycin in dextrose 5 % 1 gram/250 mL IVPB 1,000 mg (mg) 1,000 mg New Bag 03/12/22 1929                Microbiologic Results:  Microbiology Results (last 7 days)     Procedure Component Value Units Date/Time    Blood culture x two cultures. Draw prior to antibiotics. [[id number]] Collected: 03/12/22 1749    Order Status: Completed Specimen: Blood from Peripheral, Upper Arm, Right Updated: 03/13/22 2212     Blood Culture, Routine No Growth to date      No Growth to date    Narrative:      Aerobic and anaerobic    Blood culture x two cultures. Draw prior to antibiotics. [[id number]] Collected: 03/12/22 1755    Order Status: Completed Specimen: Blood from Peripheral, Hand, Left Updated: 03/13/22 2212     Blood Culture, Routine No Growth to date      No Growth to date    Narrative:      Aerobic and anaerobic    Urine Culture High Risk [[id number]] Collected: 03/13/22 0028    Order Status: Sent Specimen: Urine, Catheterized Updated: 03/13/22 0913    Influenza A & B by Molecular [[id number]] Collected: 03/12/22 1902    Order Status: Sent Specimen: Nasopharyngeal Swab Updated: 03/12/22 1902

## 2022-03-14 NOTE — NURSING
A&O*4. Tolerating Pureed diet very well. Eating breakfast. Little weak. However needs tray set up to feed self. RN helped with the breakfast. Occult blood negative. Had 3 loose BM during last 24 hours. MD notified. Ordered C. Diff collection. Stool collected and sent. Will follow up with the result.  ml/hr running on the left arm. On IV ABX Zosyn & Vanc. On tele monitor, NSR with 1st degree AV Block. No other distress at this moment. Call bell within reach. Will cont to monitor.

## 2022-03-14 NOTE — ASSESSMENT & PLAN NOTE
Contributing Nutrition Diagnosis  Inadequate oral intake    Related to (etiology):   Decreased appetite    Signs and Symptoms (as evidenced by):   PO intake 25%  mech soft/ puree diet with ONS     Interventions/Recommendations (treatment strategy):  Collaboration with other healthcare providers  SLP recs per diet  ONS    Nutrition Diagnosis Status:   New

## 2022-03-14 NOTE — SUBJECTIVE & OBJECTIVE
Interval History: having diarrhea. NAEON      Review of Systems   Constitutional:  Negative for appetite change.   Respiratory:  Negative for cough and shortness of breath.    Cardiovascular:  Negative for chest pain and leg swelling.   Gastrointestinal:  Positive for diarrhea. Negative for abdominal distention, abdominal pain and constipation.   Genitourinary:  Negative for difficulty urinating and dysuria.   Neurological:  Negative for dizziness and headaches.   Objective:     Vital Signs (Most Recent):  Temp: 97.6 °F (36.4 °C) (03/14/22 0704)  Pulse: 65 (03/14/22 0800)  Resp: 12 (03/14/22 0704)  BP: 122/64 (03/14/22 0704)  SpO2: 97 % (03/14/22 0704) Vital Signs (24h Range):  Temp:  [97.6 °F (36.4 °C)-97.9 °F (36.6 °C)] 97.6 °F (36.4 °C)  Pulse:  [] 65  Resp:  [12-20] 12  SpO2:  [95 %-99 %] 97 %  BP: (109-140)/(63-83) 122/64     Weight: 68.5 kg (151 lb 0.2 oz)  Body mass index is 24.37 kg/m².    Intake/Output Summary (Last 24 hours) at 3/14/2022 0958  Last data filed at 3/14/2022 0900  Gross per 24 hour   Intake 650 ml   Output 1150 ml   Net -500 ml      Physical Exam  Constitutional:       Appearance: Normal appearance.   HENT:      Head: Normocephalic and atraumatic.      Mouth/Throat:      Mouth: Mucous membranes are moist.   Cardiovascular:      Rate and Rhythm: Normal rate and regular rhythm.      Pulses: Normal pulses.      Heart sounds: Normal heart sounds.   Pulmonary:      Effort: Pulmonary effort is normal.      Breath sounds: Normal breath sounds. No rales.   Abdominal:      General: Abdomen is flat. Bowel sounds are normal. There is no distension.      Palpations: Abdomen is soft.      Tenderness: There is no abdominal tenderness.   Musculoskeletal:         General: Normal range of motion.      Cervical back: Normal range of motion and neck supple.   Skin:     General: Skin is warm and dry.   Neurological:      General: No focal deficit present.      Mental Status: She is alert and oriented to  person, place, and time.   Psychiatric:         Mood and Affect: Mood normal.       Significant Labs: All pertinent labs within the past 24 hours have been reviewed.    Significant Imaging: I have reviewed all pertinent imaging results/findings within the past 24 hours.

## 2022-03-14 NOTE — PROGRESS NOTES
[facility] ([place])  Wound Care    Patient Name:  Pearl Villafana   MRN:  [id number]  Date: 3/14/2022  Diagnosis: Severe sepsis    History:     Past Medical History:   Diagnosis Date    B12 deficiency anemia     Calcium oxalate renal stones     Cancer     colon cancer    DCIS (ductal carcinoma in situ)     left    Diabetes mellitus     Hyperlipidemia     Hypertension        Social History     Socioeconomic History    Marital status:    Tobacco Use    Smoking status: Passive Smoke Exposure - Never Smoker   Substance and Sexual Activity    Alcohol use: Yes     Alcohol/week: 28.0 standard drinks     Types: 28 Cans of beer per week     Comment: 4 beers a day    Drug use: No    Sexual activity: Not Currently     Partners: Male       Precautions:     Allergies as of 03/12/2022    (No Known Allergies)     81-year-old female with a past medical history of alcohol abuse, polypharmacy, colon cancer, breast cancer and hyponatremia along with diabetes hyper tension hyperlipidemia B12 deficiency and gout.  Her family contacted EMS to do a welfare check and they found her down on the ground with altered mental status in urine and feces.  Length of time down on known.  Patient is arousable to tactile touch in the bed.  She is able to tell her name her location and her date of birth.  She does follow simple commands.  Patient answers no to most questions.  She does name objects correctly.  Patient is hypothermic at 95.5° and tachycardic at 104 with moderate bacteriuria in her urinalysis. Code sepsis was called patient received a bolus of normal saline vancomycin and Zosyn.  No signs of respiratory distress.  Patient has some bruising to her right right lateral abdominal area.      [facility] Assessment Details/Treatment     Wound care consulted to see for multiple skin injuries   Bruising identified upon admit to right chin, right flank and right posterior arm. Since admission, bruising has presented to right  breast, right abdomen and a s-DTI has presented to sacral area.  Very reluctant to turn. Right heel with DTI and left heel and lateral foot with a stage 1 non blanching area of erythema identified. Mepilex heel dressing applied and offloaded with boots.   Pt continually soiling with liquid stools  Feel barrier cream is best prevention to sacral area and gluteal cleft at this time.   Bruises left MIGUEL.  Family educated on pressure injuries and the turn every 2 hour schedule.      03/14/22 1530        Altered Skin Integrity 03/12/22 2115 Right posterior Heel #1 Other (comment) Purple or maroon localized area of discolored intact skin or non-intact skin or a blood-filled blister.   Date First Assessed/Time First Assessed: 03/12/22 2115   Altered Skin Integrity Present on Admission: yes  Side: Right  Orientation: posterior  Location: Heel  Wound Number: #1  Is this injury device related?: No  Primary Wound Type: (c) Other (commen...   Wound Image     Description of Altered Skin Integrity Purple or maroon localized area of discolored intact skin or non-intact skin or a blood-filled blister.   Dressing Appearance Open to air;No dressing  (Heels not elveated off of the  bed)   Drainage Amount None   Appearance Maroon   Periwound Area Redness   Wound Length (cm) 0.8 cm   Wound Width (cm) 1.5 cm   Wound Surface Area (cm^2) 1.2 cm^2   Care Cleansed with:;Wound cleanser   Dressing Applied;Silicone;Foam        Altered Skin Integrity 03/12/22 2115 Right upper other (see comments) #2 Other (comment)   Date First Assessed/Time First Assessed: 03/12/22 2115   Altered Skin Integrity Present on Admission: yes  Side: Right  Orientation: upper  Location: (c) other (see comments)  Wound Number: #2  Is this injury device related?: No  Primary Wound Type: (...   Wound Image      Dressing Appearance Open to air   Drainage Amount None   Appearance Ecchymotic   Periwound Area Intact   Wound Length (cm) 8 cm   Wound Width (cm) 10 cm   Wound  Surface Area (cm^2) 80 cm^2        Altered Skin Integrity 03/12/22 2115 Right lower;posterior Arm Other (comment)   Date First Assessed/Time First Assessed: 03/12/22 2115   Altered Skin Integrity Present on Admission: yes  Side: Right  Orientation: lower;posterior  Location: Arm  Is this injury device related?: No  Primary Wound Type: (c) Other (comment)   Wound Image    Appearance Ecchymotic   Periwound Area Intact   Wound Length (cm) 1.5 cm   Wound Width (cm) 3 cm   Wound Surface Area (cm^2) 4.5 cm^2        Altered Skin Integrity 03/12/22 2115 midline Chin Other (comment)   Date First Assessed/Time First Assessed: 03/12/22 2115   Altered Skin Integrity Present on Admission: yes  Orientation: midline  Location: Chin  Is this injury device related?: No  Primary Wound Type: (c) Other (comment)   Wound Image    Appearance Ecchymotic   Periwound Area Intact   Wound Length (cm) 5 cm   Wound Width (cm) 2 cm   Wound Surface Area (cm^2) 10 cm^2        Altered Skin Integrity 03/14/22 1530 Left anterior Foot Intact skin with non-blanchable redness of localized area   Date First Assessed/Time First Assessed: 03/14/22 1530   Side: Left  Orientation: anterior  Location: Foot  Is this injury device related?: No  Description of Altered Skin Integrity: Intact skin with non-blanchable redness of localized area   Wound Image      Description of Altered Skin Integrity Intact skin with non-blanchable redness of localized area   Appearance Red   Wound Length (cm) 3 cm   Wound Width (cm) 3 cm  (left heel)   Wound Depth (cm)   (left lateral foot 1x2cm non blanching redness)   Wound Surface Area (cm^2) 9 cm^2   Care Cleansed with:;Wound cleanser   Dressing Applied;Foam;Silicone        Altered Skin Integrity 03/14/22 1530 Sacral spine Purple or maroon localized area of discolored intact skin or non-intact skin or a blood-filled blister.   Date First Assessed/Time First Assessed: 03/14/22 1530   Altered Skin Integrity Present on Admission:  suspected hospital acquired  Location: Sacral spine  Is this injury device related?: No  Description of Altered Skin Integrity: Purple or maroon loca...   Wound Image     Description of Altered Skin Integrity Purple or maroon localized area of discolored intact skin or non-intact skin or a blood-filled blister.   Dressing Appearance Intact;other (see comments)  (soiled with liquid stool)   Drainage Amount None   Appearance Purple;Red;Moist   Periwound Area Redness   Wound Length (cm) 9 cm  (9x9cm of erythema)   Wound Width (cm) 9 cm  (7x4cm purple discoloration at center)   Wound Surface Area (cm^2) 81 cm^2   Care Cleansed with:;Soap and water;Applied:;Skin Barrier  (applioed skin barrier as pt is frequently incontinent)       03/14/2022

## 2022-03-14 NOTE — ASSESSMENT & PLAN NOTE
Sodium was 128. Today 131 . Patient has a history of hyponatremia due to alcoholism.  Will monitor

## 2022-03-14 NOTE — NURSING
Pt A&O x 4. No complaints of pain. Pt offered fluids. Bed in lowest position, SR x 2 call light in reach. Will continue to monitor.

## 2022-03-14 NOTE — ASSESSMENT & PLAN NOTE
Chronic.  Patient's med list consists of losartan 25 mg daily metoprolol 25 mg b.i.d..   metoprolol resumed

## 2022-03-14 NOTE — PLAN OF CARE
Problem: Occupational Therapy Goal  Goal: Occupational Therapy Goal  Description: Goals to be met by: 3/21/2022    Patient will increase functional independence with ADLs by performing:    UE Dressing with Minimal Assistance.  LE Dressing with Maximum Assistance.  Grooming while standing at sink with Minimal Assistance.  Toileting from toilet with Minimal Assistance for hygiene and clothing management.   Toilet transfer to toilet with Minimal Assistance.  Step transfer with Minimal Assistance.    Outcome: Ongoing, Progressing     OT evaluation complete and POC established.  SNF is recommended upon d/c from acute care to further address deficits and help pt improve overall functional independence.     EMMY Siu  3/14/2022

## 2022-03-14 NOTE — CONSULTS
[facility] ([place])  Adult Nutrition  Consult Note    SUMMARY     Recommendations    1.Recommend SLP consult- to help with correct modifications for diet.   2.Continue Boost Plus TID.   3.Encourage good PO intake.   4. Recommend Boost Pudding BID as tolerated.    Goals: Pt to advance diet with texture/ consistency as tolerated with ONS by RD f/u.  Nutrition Goal Status: new  Communication of RD Recs:  (POC)    Assessment and Plan    Inadequate oral intake  Contributing Nutrition Diagnosis  Inadequate oral intake    Related to (etiology):   Decreased appetite    Signs and Symptoms (as evidenced by):   PO intake 25%  mech soft/ puree diet with ONS     Interventions/Recommendations (treatment strategy):  Collaboration with other healthcare providers  SLP recs per diet  ONS    Nutrition Diagnosis Status:   New    Malnutrition Assessment     Skin (Micronutrient):  (Juan Score 14)       Reason for Assessment    Reason For Assessment: consult  Diagnosis: infection/sepsis  Relevant Medical History: alcohol abuse, polypharmacy, colon cancer, breast cancer, hyponatremia, DM, HTN, HLD, vit B12 def, gout  Interdisciplinary Rounds: did not attend  General Information Comments: 3/14- RD consulted for poor PO intake. Presented after welfare check-  with AMS in urine/ feces. Pt on mech soft/ puree diet? Boost Plus TID ordered. PO intake 25%. Per notes pt drinks until pass out. 3 loose stools noted. Pt not talkative. No wt loss noted. Will continue to monitor.    Nutrition Discharge Planning: Pt to follow a DM/cardiac diet with texture/ consistent as tolerated.- cessation of alcohol    Nutrition Risk Screen    Nutrition Risk Screen: dysphagia or difficulty swallowing    Nutrition/Diet History    Spiritual, Cultural Beliefs, Jewish Practices, Values that Affect Care: no  Factors Affecting Nutritional Intake: decreased appetite, chewing difficulties/inability to chew food, difficulty/impaired swallowing, impaired  "cognitive status/motor control    Anthropometrics    Temp: 97.4 °F (36.3 °C)  Height Method: Stated  Height: 5' 6" (167.6 cm)  Height (inches): 66 in  Weight Method: Bed Scale  Weight: 68.5 kg (151 lb)  Weight (lb): 151 lb  Ideal Body Weight (IBW), Female: 130 lb  % Ideal Body Weight, Female (lb): 116.15 %  BMI (Calculated): 24.4  BMI Grade: 18.5-24.9 - normal     Lab/Procedures/Meds    Pertinent Labs Reviewed: reviewed  Pertinent Labs Comments: Na 131, BUN 46. Glu 193, H/H 11.1/33.3, phos 2.1,   Pertinent Medications Reviewed: reviewed  Pertinent Medications Comments: metoprolol tartrate, piperacillin-tazobactam, 0.9% NaCl @ 100 mL/hr    Estimated/Assessed Needs    Weight Used For Calorie Calculations: 68.5 kg (151 lb 0.2 oz)  Energy Calorie Requirements (kcal): 1600 kcal day (MSJ X AF 1.4)  Energy Need Method: [place]  Protein Requirements: 68-82 g day (1.0-1.2 g/kg)  Weight Used For Protein Calculations: 68.5 kg (151 lb 0.2 oz)     Estimated Fluid Requirement Method: RDA Method  RDA Method (mL): 1600  CHO Requirement: 200 gday    Nutrition Prescription Ordered    Current Diet Order: [facility] soft/ puree diet?    Evaluation of Received Nutrient/Fluid Intake    Energy Calories Required: not meeting needs  Protein Required: not meeting needs  Fluid Required: not meeting needs  % Intake of Estimated Energy Needs: 0 - 25 %  % Meal Intake: 0 - 25 %    Nutrition Risk    Level of Risk/Frequency of Follow-up:  (1-2 x weekly)     Monitor and Evaluation    Food and Nutrient Intake: energy intake, food and beverage intake  Food and Nutrient Adminstration: diet order  Knowledge/Beliefs/Attitudes: food and nutrition knowledge/skill, beliefs and attitudes  Physical Activity and Function: nutrition-related ADLs and IADLs  Anthropometric Measurements: weight, weight change, body mass index, growth pattern indices/percentile ranks  Biochemical Data, Medical Tests and Procedures: glucose/endocrine profile, lipid profile, " electrolyte and renal panel, gastrointestinal profile, inflammatory profile  Nutrition-Focused Physical Findings: overall appearance     Nutrition Follow-Up    RD Follow-up?: Yes

## 2022-03-14 NOTE — PROGRESS NOTES
[facility] Surg [facility] Medicine  Progress Note    Patient Name: Pearl Villafana  MRN: [id number]  Patient Class: IP- Inpatient   Admission Date: 3/12/2022  Length of Stay: 2 days  Attending Physician: Teresa Ceron MD  Primary Care Provider: Cleopatra Whaley MD        Subjective:     Principal Problem:Severe sepsis        HPI:  Calista Kang is an 81-year-old female with a past medical history of alcohol abuse, polypharmacy, colon cancer, breast cancer and hyponatremia along with diabetes hyper tension hyperlipidemia B12 deficiency and gout.  Her family contacted EMS to do a welfare check and they found her down on the ground with altered mental status in urine and feces.  Length of time down on known.  Patient is arousable to tactile touch in the bed.  She is able to tell her name her location and her date of birth.  She does follow simple commands.  Patient answers no to most questions.  She does name objects correctly.  Patient is hypothermic at 95.5° and tachycardic at 104 with moderate bacteriuria in her urinalysis. Code sepsis was called patient received a bolus of normal saline vancomycin and Zosyn.  No signs of respiratory distress.  Patient has some bruising to her right right lateral abdominal area.  No abdominal distension.  No tenderness or guarding to abdominal area.  Patient denies pain.  Patient reports she does not know why mg for how long.  She says that she remembers eating breakfast yesterday and going to some doctor appointments.  There is no family at bedside and her 's phone number is disconnected.  There are no other family members phone numbers in her chart.  Unable to confirm her history or her medications at this time.  Patient admitted to hospital medicine for further workup.      Overview/Hospital Course:  No notes on file    Interval History: having diarrhea. NAEON      Review of Systems   Constitutional:  Negative for appetite change.   Respiratory:  Negative for  cough and shortness of breath.    Cardiovascular:  Negative for chest pain and leg swelling.   Gastrointestinal:  Positive for diarrhea. Negative for abdominal distention, abdominal pain and constipation.   Genitourinary:  Negative for difficulty urinating and dysuria.   Neurological:  Negative for dizziness and headaches.   Objective:     Vital Signs (Most Recent):  Temp: 97.6 °F (36.4 °C) (03/14/22 0704)  Pulse: 65 (03/14/22 0800)  Resp: 12 (03/14/22 0704)  BP: 122/64 (03/14/22 0704)  SpO2: 97 % (03/14/22 0704) Vital Signs (24h Range):  Temp:  [97.6 °F (36.4 °C)-97.9 °F (36.6 °C)] 97.6 °F (36.4 °C)  Pulse:  [] 65  Resp:  [12-20] 12  SpO2:  [95 %-99 %] 97 %  BP: (109-140)/(63-83) 122/64     Weight: 68.5 kg (151 lb 0.2 oz)  Body mass index is 24.37 kg/m².    Intake/Output Summary (Last 24 hours) at 3/14/2022 0958  Last data filed at 3/14/2022 0900  Gross per 24 hour   Intake 650 ml   Output 1150 ml   Net -500 ml      Physical Exam  Constitutional:       Appearance: Normal appearance.   HENT:      Head: Normocephalic and atraumatic.      Mouth/Throat:      Mouth: Mucous membranes are moist.   Cardiovascular:      Rate and Rhythm: Normal rate and regular rhythm.      Pulses: Normal pulses.      Heart sounds: Normal heart sounds.   Pulmonary:      Effort: Pulmonary effort is normal.      Breath sounds: Normal breath sounds. No rales.   Abdominal:      General: Abdomen is flat. Bowel sounds are normal. There is no distension.      Palpations: Abdomen is soft.      Tenderness: There is no abdominal tenderness.   Musculoskeletal:         General: Normal range of motion.      Cervical back: Normal range of motion and neck supple.   Skin:     General: Skin is warm and dry.   Neurological:      General: No focal deficit present.      Mental Status: She is alert and oriented to person, place, and time.   Psychiatric:         Mood and Affect: Mood normal.       Significant Labs: All pertinent labs within the past 24  hours have been reviewed.    Significant Imaging: I have reviewed all pertinent imaging results/findings within the past 24 hours.      Assessment/Plan:      * Severe sepsis  Initial temperature 95.5°, tachycardia, white blood cells 14.77.  Lactic acid 2. Chest x-ray was negative for any consolidation or pleural effusions.  Concern for UTI due to moderate bacteriuria on analysis.  Patient started on Zosyn and vanc in the ED.  Blood cultures NGTD  and urine culture is pending . Will dc abx       ETOH abuse  Elevated beta hydroxybutyrate.  Ethanol less than 10. Last drink on known.  Patient with history of beer potamania.  Monitor CIWA scale every 4 hours.      CKD (chronic kidney disease), stage V  Acute kidney injury-likely prerenal  GFR 14 creatinine 3.4. improved with IV hydration , resolved    Estimated Creatinine Clearance: 32.3 mL/min (based on SCr of 1.3 mg/dL). according to latest data. Monitor UOP and serial BMP and adjust therapy as needed. Renally dose meds.            Altered mental status, unspecified  Patient has a past medical history of polypharmacy and alcohol abuse.  She was found down on the ground.  Her CT of her head was negative for hemorrhage or evidence of acute infarct; it shows some cerebral volume loss.  Patient also has a history colon cancer.  She is dehydrated, Uremic,  and has concern for a UTI.  Altered mental status likely multifactorial.  Back to her baseline . PT/OT eval     Hyponatremia  Sodium was 128. Today 131 . Patient has a history of hyponatremia due to alcoholism.  Will monitor       Colon cancer  History of colon cancer and breast cancer.  Occult stool neg.  H&H stable.  Will monitor.      Diabetes mellitus type II  Patient takes metformin 500 mg daily at home per chart.  Hold metformin at this time.  A1c 6.6.  continue Low-dose correction sliding scale       Hyperlipidemia  For chronic.  Patient takes Crestor.  Crestor held this time due to elevation of liver  enzymes.      Hypertension  Chronic.  Patient's med list consists of losartan 25 mg daily metoprolol 25 mg b.i.d..   metoprolol resumed         VTE Risk Mitigation (From admission, onward)         Ordered     Reason for No Pharmacological VTE Prophylaxis  Once        Question:  Reasons:  Answer:  Active Bleeding    03/12/22 2041     IP VTE HIGH RISK PATIENT  Once         03/12/22 2041     Place sequential compression device  Until discontinued         03/12/22 2041              Blood cx prelim showed gram +ve cocci in cluster . Will continue with vanco , will get ECHO and ID consult     Discharge Planning   SHANIKA:      Code Status: Full Code   Is the patient medically ready for discharge?:     Reason for patient still in hospital (select all that apply): Treatment  Discharge Plan A: Home Health                  Teresa Ceron MD  Department of Hospital Medicine   [facility] ([place])

## 2022-03-14 NOTE — PROGRESS NOTES
03/13/22 1914   Juan Risk Assessment   Sensory Perception 4-->no impairment   Moisture 3-->occasionally moist   Activity 1-->bedfast   Mobility 2-->very limited   Nutrition 2-->probably inadequate   Friction and Shear 2-->potential problem   Juan Score 14   [facility] - [facility] ([place])  Wound Care  Progress Note    Patient Name: Pearl Villafana  MRN: [id number]  Admission Date: 3/12/2022  Hospital Length of Stay: 2 days  Attending Physician: Teresa Ceron MD  Primary Care Provider: Cleopatra Whaley MD   No new subjective & objective note has been filed under this hospital service since the last note was generated.    Assessment/Plan:     HAPI prevention , Juan <18 PIP orders placed       Jud Orona LPN  Wound Care  [facility] ([place])

## 2022-03-14 NOTE — ASSESSMENT & PLAN NOTE
Initial temperature 95.5°, tachycardia, white blood cells 14.77.  Lactic acid 2. Chest x-ray was negative for any consolidation or pleural effusions.  Concern for UTI due to moderate bacteriuria on analysis.  Patient started on Zosyn and vanc in the ED.  Blood cultures NGTD  and urine culture is pending . Will dc abx

## 2022-03-14 NOTE — ASSESSMENT & PLAN NOTE
Patient has a past medical history of polypharmacy and alcohol abuse.  She was found down on the ground.  Her CT of her head was negative for hemorrhage or evidence of acute infarct; it shows some cerebral volume loss.  Patient also has a history colon cancer.  She is dehydrated, Uremic,  and has concern for a UTI.  Altered mental status likely multifactorial.  Back to her baseline . PT/OT richard

## 2022-03-14 NOTE — ASSESSMENT & PLAN NOTE
Acute kidney injury-likely prerenal  GFR 14 creatinine 3.4. improved with IV hydration , resolved    Estimated Creatinine Clearance: 32.3 mL/min (based on SCr of 1.3 mg/dL). according to latest data. Monitor UOP and serial BMP and adjust therapy as needed. Renally dose meds.

## 2022-03-15 PROBLEM — N18.30 ACUTE RENAL FAILURE SUPERIMPOSED ON STAGE 3 CHRONIC KIDNEY DISEASE: Status: ACTIVE | Noted: 2022-03-12

## 2022-03-15 PROBLEM — N17.9 ACUTE RENAL FAILURE SUPERIMPOSED ON STAGE 3 CHRONIC KIDNEY DISEASE: Status: ACTIVE | Noted: 2022-03-12

## 2022-03-15 LAB
ANION GAP SERPL CALC-SCNC: 6 MMOL/L (ref 8–16)
BUN SERPL-MCNC: 28 MG/DL (ref 8–23)
CALCIUM SERPL-MCNC: 8.7 MG/DL (ref 8.7–10.5)
CHLORIDE SERPL-SCNC: 110 MMOL/L (ref 95–110)
CO2 SERPL-SCNC: 19 MMOL/L (ref 23–29)
CREAT SERPL-MCNC: 0.8 MG/DL (ref 0.5–1.4)
ERYTHROCYTE [DISTWIDTH] IN BLOOD BY AUTOMATED COUNT: 14.1 % (ref 11.5–14.5)
EST. GFR  (AFRICAN AMERICAN): >60 ML/MIN/1.73 M^2
EST. GFR  (NON AFRICAN AMERICAN): >60 ML/MIN/1.73 M^2
GLUCOSE SERPL-MCNC: 150 MG/DL (ref 70–110)
HCT VFR BLD AUTO: 33.9 % (ref 37–48.5)
HGB BLD-MCNC: 11 G/DL (ref 12–16)
MAGNESIUM SERPL-MCNC: 1.4 MG/DL (ref 1.6–2.6)
MCH RBC QN AUTO: 29.9 PG (ref 27–31)
MCHC RBC AUTO-ENTMCNC: 32.4 G/DL (ref 32–36)
MCV RBC AUTO: 92 FL (ref 82–98)
PHOSPHATE SERPL-MCNC: 1.9 MG/DL (ref 2.7–4.5)
PLATELET # BLD AUTO: 149 K/UL (ref 150–450)
PMV BLD AUTO: 10.6 FL (ref 9.2–12.9)
POCT GLUCOSE: 158 MG/DL (ref 70–110)
POCT GLUCOSE: 178 MG/DL (ref 70–110)
POCT GLUCOSE: 190 MG/DL (ref 70–110)
POCT GLUCOSE: 226 MG/DL (ref 70–110)
POCT GLUCOSE: 278 MG/DL (ref 70–110)
POTASSIUM SERPL-SCNC: 4.5 MMOL/L (ref 3.5–5.1)
RBC # BLD AUTO: 3.68 M/UL (ref 4–5.4)
SODIUM SERPL-SCNC: 135 MMOL/L (ref 136–145)
VANCOMYCIN SERPL-MCNC: 9.2 UG/ML
WBC # BLD AUTO: 8.46 K/UL (ref 3.9–12.7)

## 2022-03-15 PROCEDURE — 63600175 PHARM REV CODE 636 W HCPCS: Performed by: NURSE PRACTITIONER

## 2022-03-15 PROCEDURE — 99233 PR SUBSEQUENT HOSPITAL CARE,LEVL III: ICD-10-PCS | Mod: ,,, | Performed by: INTERNAL MEDICINE

## 2022-03-15 PROCEDURE — 25000003 PHARM REV CODE 250: Performed by: INTERNAL MEDICINE

## 2022-03-15 PROCEDURE — 85027 COMPLETE CBC AUTOMATED: CPT | Performed by: NURSE PRACTITIONER

## 2022-03-15 PROCEDURE — 25000003 PHARM REV CODE 250: Performed by: NURSE PRACTITIONER

## 2022-03-15 PROCEDURE — 21400001 HC TELEMETRY ROOM

## 2022-03-15 PROCEDURE — 84100 ASSAY OF PHOSPHORUS: CPT | Performed by: NURSE PRACTITIONER

## 2022-03-15 PROCEDURE — 80202 ASSAY OF VANCOMYCIN: CPT | Performed by: INTERNAL MEDICINE

## 2022-03-15 PROCEDURE — 99233 SBSQ HOSP IP/OBS HIGH 50: CPT | Mod: ,,, | Performed by: INTERNAL MEDICINE

## 2022-03-15 PROCEDURE — 83735 ASSAY OF MAGNESIUM: CPT | Performed by: NURSE PRACTITIONER

## 2022-03-15 PROCEDURE — 80048 BASIC METABOLIC PNL TOTAL CA: CPT | Performed by: NURSE PRACTITIONER

## 2022-03-15 RX ORDER — LOSARTAN POTASSIUM 25 MG/1
25 TABLET ORAL DAILY
Status: DISCONTINUED | OUTPATIENT
Start: 2022-03-15 | End: 2022-03-17

## 2022-03-15 RX ADMIN — METOPROLOL TARTRATE 25 MG: 25 TABLET, FILM COATED ORAL at 08:03

## 2022-03-15 RX ADMIN — MUPIROCIN: 20 OINTMENT TOPICAL at 08:03

## 2022-03-15 RX ADMIN — VANCOMYCIN HYDROCHLORIDE 1250 MG: 1.25 INJECTION, POWDER, LYOPHILIZED, FOR SOLUTION INTRAVENOUS at 10:03

## 2022-03-15 RX ADMIN — INSULIN ASPART 1 UNITS: 100 INJECTION, SOLUTION INTRAVENOUS; SUBCUTANEOUS at 09:03

## 2022-03-15 RX ADMIN — LOSARTAN POTASSIUM 25 MG: 25 TABLET, FILM COATED ORAL at 10:03

## 2022-03-15 RX ADMIN — SODIUM CHLORIDE: 0.9 INJECTION, SOLUTION INTRAVENOUS at 08:03

## 2022-03-15 NOTE — ASSESSMENT & PLAN NOTE
Acute kidney injury-likely prerenal - Creatinine 3.4 on admission and improved with IV hydration and now 0.8 this morning.  -Monitor

## 2022-03-15 NOTE — ASSESSMENT & PLAN NOTE
Sodium was 128 on admission and improved to 135 today after IV hydration. Patient has a history of hyponatremia due to alcoholism.    -Will monitor

## 2022-03-15 NOTE — ASSESSMENT & PLAN NOTE
Home Meds:  Metformin 500 mg daily at home per chart.    A1c is 6.6  -Continue to hold Metformin at this time.    -Continue Low-dose correction sliding scale

## 2022-03-15 NOTE — ASSESSMENT & PLAN NOTE
Home Meds:  Losartan 25 mg daily and Metoprolol 25 mg b.i.d..    Metoprolol resumed   SBP elevated  -Resume Losartan today.

## 2022-03-15 NOTE — PROGRESS NOTES
[facility] Medicine  Progress Note    Patient Name: Pearl Villafana  MRN: [id number]  Patient Class: IP- Inpatient   Admission Date: 3/12/2022  Length of Stay: 3 days  Attending Physician: Juan Lala MD  Primary Care Provider: Cleopatra Whaley MD        Subjective:     Principal Problem:Sepsis        HPI:  Calista Kang is an 81-year-old female with a past medical history of alcohol abuse, polypharmacy, colon cancer, breast cancer and hyponatremia along with diabetes hyper tension hyperlipidemia B12 deficiency and gout.  Her family contacted EMS to do a welfare check and they found her down on the ground with altered mental status in urine and feces.  Length of time down on known.  Patient is arousable to tactile touch in the bed.  She is able to tell her name her location and her date of birth.  She does follow simple commands.  Patient answers no to most questions.  She does name objects correctly.  Patient is hypothermic at 95.5° and tachycardic at 104 with moderate bacteriuria in her urinalysis. Code sepsis was called patient received a bolus of normal saline vancomycin and Zosyn.  No signs of respiratory distress.  Patient has some bruising to her right right lateral abdominal area.  No abdominal distension.  No tenderness or guarding to abdominal area.  Patient denies pain.  Patient reports she does not know why mg for how long.  She says that she remembers eating breakfast yesterday and going to some doctor appointments.  There is no family at bedside and her 's phone number is disconnected.  There are no other family members phone numbers in her chart.  Unable to confirm her history or her medications at this time.  Patient admitted to hospital medicine for further workup.      Overview/Hospital Course:  See below      Interval History: Patient is awake and alert this morning.  Still with watery/loose stool yesterday, but decreasing.  She is oriented to person,place, month,  year, but not to President.  Able to do serial 7s to 14.  She is interested in SNF.    Review of Systems   Constitutional:  Negative for appetite change, chills and fever.   HENT:  Negative for ear pain.    Eyes:  Negative for pain.   Respiratory:  Negative for cough and shortness of breath.    Cardiovascular:  Negative for chest pain, palpitations and leg swelling.   Gastrointestinal:  Positive for diarrhea (improving). Negative for abdominal distention, abdominal pain, constipation, nausea and vomiting.   Genitourinary:  Negative for difficulty urinating and dysuria.   Musculoskeletal:  Negative for neck pain.   Skin:  Negative for rash.   Neurological:  Negative for dizziness and headaches.   Psychiatric/Behavioral:  Negative for agitation, behavioral problems and confusion.    Objective:     Vital Signs (Most Recent):  Temp: 98.1 °F (36.7 °C) (03/15/22 0822)  Pulse: 66 (03/15/22 0822)  Resp: 18 (03/15/22 0822)  BP: (!) 170/71 (Simultaneous filing. User may not have seen previous data.) (03/15/22 0822)  SpO2: 97 % (03/15/22 0822) Vital Signs (24h Range):  Temp:  [97.4 °F (36.3 °C)-98.3 °F (36.8 °C)] 98.1 °F (36.7 °C)  Pulse:  [54-69] 66  Resp:  [15-20] 18  SpO2:  [95 %-98 %] 97 %  BP: (119-170)/(56-75) 170/71     Weight: 68.5 kg (151 lb)  Body mass index is 24.37 kg/m².    Intake/Output Summary (Last 24 hours) at 3/15/2022 0908  Last data filed at 3/15/2022 0834  Gross per 24 hour   Intake 480 ml   Output 900 ml   Net -420 ml        Physical Exam  Constitutional:       Appearance: Normal appearance.   HENT:      Head: Normocephalic and atraumatic.      Right Ear: External ear normal.      Left Ear: External ear normal.      Nose: Nose normal.      Mouth/Throat:      Mouth: Mucous membranes are moist.   Eyes:      General: No scleral icterus.     Pupils: Pupils are equal, round, and reactive to light.   Cardiovascular:      Rate and Rhythm: Normal rate and regular rhythm.      Pulses: Normal pulses.      Heart  sounds: Normal heart sounds.   Pulmonary:      Effort: Pulmonary effort is normal.      Breath sounds: Normal breath sounds. No rales.   Abdominal:      General: Abdomen is flat. Bowel sounds are normal. There is no distension.      Palpations: Abdomen is soft.      Tenderness: There is no abdominal tenderness.   Musculoskeletal:         General: Normal range of motion.      Cervical back: Normal range of motion and neck supple.      Right lower leg: No edema.      Left lower leg: No edema.   Skin:     General: Skin is warm and dry.   Neurological:      General: No focal deficit present.      Mental Status: She is alert and oriented to person, place, and time. Mental status is at baseline.      Cranial Nerves: No cranial nerve deficit.      Motor: No weakness.   Psychiatric:         Mood and Affect: Mood normal.       Significant Labs: All pertinent labs within the past 24 hours have been reviewed.    Significant Imaging: I have reviewed all pertinent imaging results/findings within the past 24 hours.      Assessment/Plan:      * Sepsis  Patient presented after family contacted EMS to do a welfare check - they found her down on the ground with altered mental status in urine and feces.  Length of time down on known.  Patient was hypothermic at 95.5° and tachycardic at 104 with moderate bacteriuria in her urinalysis.  Code sepsis was called in ED and patient received a bolus of normal saline, as well as IV Vancomycin and Zosyn. Chest x-ray was negative for any consolidation or pleural effusions.  Concern for UTI due to moderate bacteriuria on analysis.  WBC was elevated at 14.77 with left shift, Procalcitonin of 0.34, normal lactic acid, normal ammonia, negative ETOH and Tox screen.  Blood cultures growing 1/4 bottles with [facility] (likely contaminant)  and Urine culture is pending . Antibiotics were d/c on 3/14/2022.  Patient is awake and alert this morning to person, place, month, year, but not to President.  Can do  serial 7s to 14.    Plan:  Follow off antibiotics for now.  PT consulted - recommend SNF - patient agrees  Follow up Blood Culture identification  D/c Marquez    Acute renal failure superimposed on stage 3 chronic kidney disease  Acute kidney injury-likely prerenal - Creatinine 3.4 on admission and improved with IV hydration and now 0.8 this morning.  -Monitor            Hyponatremia  Sodium was 128 on admission and improved to 135 today after IV hydration. Patient has a history of hyponatremia due to alcoholism.    -Will monitor       Altered mental status, unspecified  Patient has a past medical history of polypharmacy and alcohol abuse.  She was found down on the ground.  Her CT of her head was negative for hemorrhage or evidence of acute infarcts. Altered mental status likely multifactorial.  Back to her baseline .      ETOH abuse  Elevated beta hydroxybutyrate.  Ethanol less than 10. Last drink on known.  Patient with history of beer potamania.   -Monitor CIWA scale every 4 hours.      Diabetes mellitus type II  Home Meds:  Metformin 500 mg daily at home per chart.    A1c is 6.6  -Continue to hold Metformin at this time.    -Continue Low-dose correction sliding scale       Decubitus ulcer of back, stage 1  -Continue with wound care      Colon cancer  History of colon cancer and breast cancer.  Occult stool neg.  H&H stable.  Will monitor.      Hyperlipidemia  On Crestor as outpatient - held this time due to elevation of liver enzymes.      Hypertension  Home Meds:  Losartan 25 mg daily and Metoprolol 25 mg b.i.d..    Metoprolol resumed   SBP elevated  -Resume Losartan today.    VTE Risk Mitigation (From admission, onward)         Ordered     Reason for No Pharmacological VTE Prophylaxis  Once        Question:  Reasons:  Answer:  Active Bleeding    03/12/22 2041     IP VTE HIGH RISK PATIENT  Once         03/12/22 2041     Place sequential compression device  Until discontinued         03/12/22 2041                 Discharge Planning   SHANIKA:      Code Status: Full Code   Is the patient medically ready for discharge?:     Reason for patient still in hospital (select all that apply): Patient trending condition, Treatment and Consult recommendations  Discharge Plan A: Home Health                  Juan Lala MD  Department of Hospital Medicine   [facility] ([place])

## 2022-03-15 NOTE — SUBJECTIVE & OBJECTIVE
Interval History: Patient is awake and alert this morning.  Still with watery/loose stool yesterday, but decreasing.  She is oriented to person,place, month, year, but not to President.  Able to do serial 7s to 14.  She is interested in SNF.    Review of Systems   Constitutional:  Negative for appetite change, chills and fever.   HENT:  Negative for ear pain.    Eyes:  Negative for pain.   Respiratory:  Negative for cough and shortness of breath.    Cardiovascular:  Negative for chest pain, palpitations and leg swelling.   Gastrointestinal:  Positive for diarrhea (improving). Negative for abdominal distention, abdominal pain, constipation, nausea and vomiting.   Genitourinary:  Negative for difficulty urinating and dysuria.   Musculoskeletal:  Negative for neck pain.   Skin:  Negative for rash.   Neurological:  Negative for dizziness and headaches.   Psychiatric/Behavioral:  Negative for agitation, behavioral problems and confusion.    Objective:     Vital Signs (Most Recent):  Temp: 98.1 °F (36.7 °C) (03/15/22 0822)  Pulse: 66 (03/15/22 0822)  Resp: 18 (03/15/22 0822)  BP: (!) 170/71 (Simultaneous filing. User may not have seen previous data.) (03/15/22 0822)  SpO2: 97 % (03/15/22 0822) Vital Signs (24h Range):  Temp:  [97.4 °F (36.3 °C)-98.3 °F (36.8 °C)] 98.1 °F (36.7 °C)  Pulse:  [54-69] 66  Resp:  [15-20] 18  SpO2:  [95 %-98 %] 97 %  BP: (119-170)/(56-75) 170/71     Weight: 68.5 kg (151 lb)  Body mass index is 24.37 kg/m².    Intake/Output Summary (Last 24 hours) at 3/15/2022 0908  Last data filed at 3/15/2022 0834  Gross per 24 hour   Intake 480 ml   Output 900 ml   Net -420 ml        Physical Exam  Constitutional:       Appearance: Normal appearance.   HENT:      Head: Normocephalic and atraumatic.      Right Ear: External ear normal.      Left Ear: External ear normal.      Nose: Nose normal.      Mouth/Throat:      Mouth: Mucous membranes are moist.   Eyes:      General: No scleral icterus.     Pupils:  Pupils are equal, round, and reactive to light.   Cardiovascular:      Rate and Rhythm: Normal rate and regular rhythm.      Pulses: Normal pulses.      Heart sounds: Normal heart sounds.   Pulmonary:      Effort: Pulmonary effort is normal.      Breath sounds: Normal breath sounds. No rales.   Abdominal:      General: Abdomen is flat. Bowel sounds are normal. There is no distension.      Palpations: Abdomen is soft.      Tenderness: There is no abdominal tenderness.   Musculoskeletal:         General: Normal range of motion.      Cervical back: Normal range of motion and neck supple.      Right lower leg: No edema.      Left lower leg: No edema.   Skin:     General: Skin is warm and dry.   Neurological:      General: No focal deficit present.      Mental Status: She is alert and oriented to person, place, and time. Mental status is at baseline.      Cranial Nerves: No cranial nerve deficit.      Motor: No weakness.   Psychiatric:         Mood and Affect: Mood normal.       Significant Labs: All pertinent labs within the past 24 hours have been reviewed.    Significant Imaging: I have reviewed all pertinent imaging results/findings within the past 24 hours.

## 2022-03-15 NOTE — PROGRESS NOTES
Pharmacokinetic Assessment Follow Up: IV Vancomycin    Vancomycin serum concentration assessment(s):    The random level was drawn correctly and can be used to guide therapy at this time. The measurement is below the desired definitive target range of 10 to 20 mcg/mL.    Vancomycin Regimen Plan:    Give one time dose of Vancomycin 1250mg IVPB.  Re-dose when the random level is less than 20 mcg/mL, next level to be drawn at 0900 on 3/16    Drug levels (last 3 results):  Recent Labs   Lab Result Units 03/13/22  2300 03/15/22  0616   Vancomycin, Random ug/mL 5.4 9.2       Pharmacy will continue to follow and monitor vancomycin.    Please contact pharmacy at extension [phone] for questions regarding this assessment.    Thank you for the consult,   Tonya Dang       Patient brief summary:  Pearl Villafana is a 81 y.o. female initiated on antimicrobial therapy with IV Vancomycin for treatment of bacteremia    The patient's current regimen is pulse dosing    Drug Allergies:   Review of patient's allergies indicates:  No Known Allergies    Actual Body Weight:   68.5kg    Renal Function:   Estimated Creatinine Clearance: 52.5 mL/min (based on SCr of 0.8 mg/dL).,     Dialysis Method (if applicable):  N/A    CBC (last 72 hours):  Recent Labs   Lab Result Units 03/12/22  1748 03/12/22  2153 03/13/22  0806 03/14/22  0517 03/15/22  0616   WBC K/uL 14.77*  --  10.71 9.90 8.46   Hemoglobin g/dL 14.7  --  12.4 11.1* 11.0*   Hemoglobin A1C %  --  6.6*  --   --   --    Hematocrit % 42.0  --  36.1* 33.3* 33.9*   Platelets K/uL 272  --  197 162 149*   Gran % % 87.6*  --   --   --   --    Lymph % % 5.6*  --   --   --   --    Mono % % 6.0  --   --   --   --    Eosinophil % % 0.0  --   --   --   --    Basophil % % 0.2  --   --   --   --    Differential Method  Automated  --   --   --   --        Metabolic Panel (last 72 hours):  Recent Labs   Lab Result Units 03/12/22  1748 03/12/22  1758 03/12/22  1856 03/12/22 2055 03/13/22  0806  03/14/22  0517 03/15/22  0616   Sodium mmol/L 128*  --   --   --  130* 131* 135*   Potassium mmol/L 5.1  --   --   --  4.7 4.5 4.5   Chloride mmol/L 92*  --   --   --  102 106 110   CO2 mmol/L 18*  --   --   --  16* 18* 19*   Glucose mg/dL 224*  --   --   --  203* 193* 150*   Glucose, UA   --   --  1+*  --   --   --   --    BUN mg/dL 64*  --   --   --  61* 46* 28*   Creatinine mg/dL 3.4*  --   --   --  2.0* 1.3 0.8   Creatinine, Urine mg/dL  --   --   --  162.0  --   --   --    Albumin g/dL 4.0  --   --   --   --   --   --    Total Bilirubin mg/dL 0.8  --   --   --   --   --   --    Alkaline Phosphatase U/L 52*  --   --   --   --   --   --    AST U/L 50*  --   --   --   --   --   --    ALT U/L 42  --   --   --   --   --   --    Magnesium mg/dL  --  2.1  --   --  1.8 1.7 1.4*   Phosphorus mg/dL  --  6.0*  --   --  3.4 2.1* 1.9*       Vancomycin Administrations:  vancomycin given in the last 96 hours                     vancomycin in dextrose 5 % 1 gram/250 mL IVPB 1,000 mg (mg) 1,000 mg New Bag 03/14/22 0105    vancomycin in dextrose 5 % 1 gram/250 mL IVPB 1,000 mg (mg) 1,000 mg New Bag 03/12/22 1929                    Microbiologic Results:  Microbiology Results (last 7 days)       Procedure Component Value Units Date/Time    Blood culture x two cultures. Draw prior to antibiotics. [[id number]] Collected: 03/12/22 1749    Order Status: Completed Specimen: Blood from Peripheral, Upper Arm, Right Updated: 03/15/22 0813     Blood Culture, Routine Gram stain aer bottle: Gram positive cocci in clusters resembling Staph       Results called to and read back by: MD ENRICO RN  03/14/2022  10:56    Narrative:      Aerobic and anaerobic    Blood culture x two cultures. Draw prior to antibiotics. [[id number]] Collected: 03/12/22 5398    Order Status: Completed Specimen: Blood from Peripheral, Hand, Left Updated: 03/14/22 2212     Blood Culture, Routine No Growth to date      No Growth to date      No Growth to date     Narrative:      Aerobic and anaerobic    Clostridium difficile EIA [[id number]] Collected: 03/14/22 0815    Order Status: Completed Specimen: Stool Updated: 03/14/22 1346     C. diff Antigen Negative     C difficile Toxins A+B, EIA Negative     Comment: Testing not recommended for children <24 months old.       Urine Culture High Risk [[id number]] Collected: 03/13/22 0028    Order Status: Completed Specimen: Urine, Catheterized Updated: 03/14/22 1211     Urine Culture, Routine No growth    Narrative:      Indicated criteria for high risk culture:->Other  Other (specify):->Elderly, septic, history of UTIs, moderate  bacteria on analysis    Influenza A & B by Molecular [[id number]] Collected: 03/12/22 1902    Order Status: Sent Specimen: Nasopharyngeal Swab Updated: 03/12/22 1902

## 2022-03-15 NOTE — ASSESSMENT & PLAN NOTE
Patient presented after family contacted EMS to do a welfare check - they found her down on the ground with altered mental status in urine and feces.  Length of time down on known.  Patient was hypothermic at 95.5° and tachycardic at 104 with moderate bacteriuria in her urinalysis.  Code sepsis was called in ED and patient received a bolus of normal saline, as well as IV Vancomycin and Zosyn. Chest x-ray was negative for any consolidation or pleural effusions.  Concern for UTI due to moderate bacteriuria on analysis.  WBC was elevated at 14.77 with left shift, Procalcitonin of 0.34, normal lactic acid, normal ammonia, negative ETOH and Tox screen.  Blood cultures growing 1/4 bottles with [facility] (likely contaminant)  and Urine culture is pending . Antibiotics were d/c on 3/14/2022.  Patient is awake and alert this morning to person, place, month, year, but not to President.  Can do serial 7s to 14.    Plan:  Follow off antibiotics for now.  PT consulted - recommend SNF - patient agrees  Follow up Blood Culture identification  D/c Marquez

## 2022-03-15 NOTE — ASSESSMENT & PLAN NOTE
Patient has a past medical history of polypharmacy and alcohol abuse.  She was found down on the ground.  Her CT of her head was negative for hemorrhage or evidence of acute infarcts. Altered mental status likely multifactorial.  Back to her baseline .

## 2022-03-15 NOTE — HOSPITAL COURSE
Patient presented after family contacted EMS to do a welfare check - they found her down on the ground with altered mental status in urine and feces.  Length of time down on known.  Patient was hypothermic at 95.5° and tachycardic at 104 with moderate bacteriuria in her urinalysis.  Code sepsis was called in ED and patient received a bolus of normal saline, as well as IV Vancomycin and Zosyn. Chest x-ray was negative for any consolidation or pleural effusions.  Concern for UTI due to moderate bacteriuria on analysis.  WBC was elevated at 14.77 with left shift, Procalcitonin of 0.34, normal lactic acid, normal ammonia, negative ETOH and Tox screen.  Blood cultures growing 1/4 bottles with Coag-negative Staph (likely contaminant)  and Urine culture was negative. Antibiotics were d/c on 3/14/2022.  Failed Voiding Trial yesterday and Zayas replaced overnight.  Patient is awake and oriented to person, place, month, year, but not to President.  Can do serial 7s to 14.  Awaiting SNF placement. Unable to obtain after > 1 week so after discussion with daughters, patient discharged home with .

## 2022-03-15 NOTE — PT/OT/SLP PROGRESS
"Occupational Therapy      Patient Name:  Pearl Villafana   MRN:  [id number]    Attempted to see pt at 10:53.  Upon arrival to room pt declined participation in therapy stating she was fatigued and not feeling up to it today.  OT and PT educated pt on purpose of therapy in acute care setting and discussed benefits of participating.  Pt repeatedly stated she had a lot on her mind and needed time to think about everything.  Encouragement provided; however, pt kept stating, "I'll do it tomorrow."  OT present in room from 10:53-10:58.  Will follow-up at next scheduled therapy session.    EMMY Siu  3/15/2022  "

## 2022-03-15 NOTE — PT/OT/SLP PROGRESS
Instructions reviewed with patient. Reviewed new insulin and encouraged follow up appts. IV site removed. No other questions at this time. Physical Therapy      Patient Name:  Pearl Villafana   MRN:  [id number]    Patient not seen today secondary to Patient fatigue. She noted she is not feeling up to it today and she will try tomorrow. She wants to take mobility slow and get some things off of her mind today. PT / OT attempted to educate pt on importance of out of bed mobility and participation in therapy session, however, pt continued to say she will participate tomorrow. Will follow-up as schedule allows and as pt is available / appropriate.     Time in room: 10:52 - 10:58

## 2022-03-15 NOTE — PLAN OF CARE
CM met with pt and spoke to pt's daughter, Magdalena Ortez, [phone], to discuss SNF. Pt is agreeable  to SNF. CM explained process to daughter, she is in agreement with first available. Pt choice form signed, referrals sent thru careport to area SNF's.    CM to follow for plans and arrangements.   03/15/22 1204   Discharge Reassessment   Assessment Type Discharge Planning Reassessment   Did the patient's condition or plan change since previous assessment? No   Discharge Plan discussed with: Patient;Adult children   Communicated SHANIKA with patient/caregiver Date not available/Unable to determine   Discharge Plan A Skilled Nursing Facility   Discharge Plan B Skilled Nursing Facility   DME Needed Upon Discharge  none   Discharge Barriers Identified None   Why the patient remains in the hospital Requires continued medical care   Post-Acute Status   Post-Acute Authorization Placement   Post-Acute Placement Status Referrals Sent   Patient choice form signed by patient/caregiver List with quality metrics by geographic area provided;List from CMS Compare;List from System Post-Acute Care

## 2022-03-15 NOTE — ASSESSMENT & PLAN NOTE
Elevated beta hydroxybutyrate.  Ethanol less than 10. Last drink on known.  Patient with history of beer potamania.   -Monitor CIWA scale every 4 hours.

## 2022-03-15 NOTE — NURSING
Pt unable to void post indwelling mcdonald removal. Bladder scan revealed 366mls of urine. Dr. Lala was informed and a straight cath was ordered. The straight cath was performed and 500mls of urine was obtained.

## 2022-03-16 LAB
BACTERIA BLD CULT: ABNORMAL
POCT GLUCOSE: 144 MG/DL (ref 70–110)
POCT GLUCOSE: 194 MG/DL (ref 70–110)
POCT GLUCOSE: 242 MG/DL (ref 70–110)
POCT GLUCOSE: 250 MG/DL (ref 70–110)
POCT GLUCOSE: 296 MG/DL (ref 70–110)
VANCOMYCIN SERPL-MCNC: 11.4 UG/ML

## 2022-03-16 PROCEDURE — 63600175 PHARM REV CODE 636 W HCPCS: Performed by: INTERNAL MEDICINE

## 2022-03-16 PROCEDURE — 97110 THERAPEUTIC EXERCISES: CPT | Mod: CQ

## 2022-03-16 PROCEDURE — 99233 SBSQ HOSP IP/OBS HIGH 50: CPT | Mod: ,,, | Performed by: INTERNAL MEDICINE

## 2022-03-16 PROCEDURE — 25000003 PHARM REV CODE 250: Performed by: NURSE PRACTITIONER

## 2022-03-16 PROCEDURE — 99233 PR SUBSEQUENT HOSPITAL CARE,LEVL III: ICD-10-PCS | Mod: ,,, | Performed by: INTERNAL MEDICINE

## 2022-03-16 PROCEDURE — 21400001 HC TELEMETRY ROOM

## 2022-03-16 PROCEDURE — 25000003 PHARM REV CODE 250: Performed by: INTERNAL MEDICINE

## 2022-03-16 PROCEDURE — 36415 COLL VENOUS BLD VENIPUNCTURE: CPT | Performed by: INTERNAL MEDICINE

## 2022-03-16 PROCEDURE — 97535 SELF CARE MNGMENT TRAINING: CPT

## 2022-03-16 PROCEDURE — 51702 INSERT TEMP BLADDER CATH: CPT

## 2022-03-16 PROCEDURE — 97530 THERAPEUTIC ACTIVITIES: CPT | Mod: CQ

## 2022-03-16 PROCEDURE — 80202 ASSAY OF VANCOMYCIN: CPT | Performed by: INTERNAL MEDICINE

## 2022-03-16 PROCEDURE — 97110 THERAPEUTIC EXERCISES: CPT

## 2022-03-16 RX ORDER — VANCOMYCIN HCL IN 5 % DEXTROSE 1G/250ML
15 PLASTIC BAG, INJECTION (ML) INTRAVENOUS ONCE
Status: COMPLETED | OUTPATIENT
Start: 2022-03-16 | End: 2022-03-16

## 2022-03-16 RX ORDER — LANOLIN ALCOHOL/MO/W.PET/CERES
400 CREAM (GRAM) TOPICAL 2 TIMES DAILY
Status: COMPLETED | OUTPATIENT
Start: 2022-03-16 | End: 2022-03-17

## 2022-03-16 RX ADMIN — INSULIN ASPART 1 UNITS: 100 INJECTION, SOLUTION INTRAVENOUS; SUBCUTANEOUS at 09:03

## 2022-03-16 RX ADMIN — VANCOMYCIN HYDROCHLORIDE 1000 MG: 1 INJECTION, POWDER, LYOPHILIZED, FOR SOLUTION INTRAVENOUS at 11:03

## 2022-03-16 RX ADMIN — INSULIN ASPART 2 UNITS: 100 INJECTION, SOLUTION INTRAVENOUS; SUBCUTANEOUS at 05:03

## 2022-03-16 RX ADMIN — MUPIROCIN: 20 OINTMENT TOPICAL at 08:03

## 2022-03-16 RX ADMIN — LOSARTAN POTASSIUM 25 MG: 25 TABLET, FILM COATED ORAL at 08:03

## 2022-03-16 RX ADMIN — MAGNESIUM OXIDE TAB 400 MG (241.3 MG ELEMENTAL MG) 400 MG: 400 (241.3 MG) TAB at 08:03

## 2022-03-16 RX ADMIN — METOPROLOL TARTRATE 25 MG: 25 TABLET, FILM COATED ORAL at 08:03

## 2022-03-16 RX ADMIN — SODIUM CHLORIDE: 0.9 INJECTION, SOLUTION INTRAVENOUS at 06:03

## 2022-03-16 RX ADMIN — SODIUM CHLORIDE: 0.9 INJECTION, SOLUTION INTRAVENOUS at 08:03

## 2022-03-16 NOTE — ASSESSMENT & PLAN NOTE
Home Meds:  Losartan 25 mg daily and Metoprolol 25 mg b.i.d..     SBP stable  -Continue with Home Meds  -Monitor and adjust as needed

## 2022-03-16 NOTE — ASSESSMENT & PLAN NOTE
Patient has a past medical history of polypharmacy and alcohol abuse.  She was found down on the ground.  Her CT of her head was negative for hemorrhage or evidence of acute infarcts. Altered mental status likely multifactorial.  Back to her baseline now.

## 2022-03-16 NOTE — PT/OT/SLP PROGRESS
"Occupational Therapy   Treatment    Name: Pearl Villafana  MRN: [id number]  Admitting Diagnosis:  Sepsis       Recommendations:     Discharge Recommendations: nursing facility, skilled  Discharge Equipment Recommendations:   (defer to next leve of care)  Barriers to discharge:   (current functional level)    Assessment:   Supine<>sit with MIN A today and able to stand though requiring RW, cuing for preparatory positioning for task with initial MOD A of 2 persons upon initial trial and MIN A of 2 persons during 2nd trial.  Requires cues for more upright posture during task with good initial follow through for forward hips and upright chest.  TOTAL A for bed level toileting this day as Pt. Found with stool on padding and Pt. Stating, "I think I went."  Progressing towards goals.  Continued recommendation of post acute OT in SNF.  To benefit from continued acute care OT services to increase independence in self-care/functional transfers.  Continue POC.     Pearl Villafana is a 81 y.o. female with a medical diagnosis of Sepsis.  She presents with below deficits decreasing independence in self-care/functional transfers. Performance deficits affecting function are weakness, impaired endurance, impaired self care skills, impaired functional mobilty, gait instability, impaired balance, decreased safety awareness, decreased lower extremity function, decreased upper extremity function, decreased ROM, impaired skin.     Rehab Prognosis:  Good; patient would benefit from acute skilled OT services to address these deficits and reach maximum level of function.       Plan:     Patient to be seen   to address the above listed problems via self-care/home management, therapeutic activities, therapeutic exercises  · Plan of Care Expires: 04/13/22  · Plan of Care Reviewed with: patient    Subjective     Pain/Comfort:  · Pain Rating 1: 0/10  · Pain Rating Post-Intervention 1: 0/10    Objective:     Communicated with: González GARCIA RN prior to " session.  Patient found HOB elevated with peripheral IV, telemetry, mcdonald catheter, pressure relief boots upon OT entry to room.    General Precautions: Standard, fall   Orthopedic Precautions:N/A   Braces: N/A  Respiratory Status: Room air     Occupational Performance:     Bed Mobility:    · B-rolling X 2 trials with MIN A for lower trunk management and increased cuing for bed rail use and task sequencing.  Supine>sit with MOD A for trunk management and MIN A for LE management; supine>sit with MAX A for trunk/LE management.  Scooting towards HOB while supine with instruction on BLE positioning to knee flexion with assist for stabilizing heels scooting upward with approx. 1-inch of upward excursion noted and 0 upward excursion noted at 2nd attempt requiring TOTAL A of 2 persons via draw sheet tech for task completion.    Functional Mobility/Transfers:  · Sit<>stand bed<>RW with MOD A of 2 persons for first trial and MIN A of 2 persons for 2nd trial requiring approx. 3-minute rest break between trials; increased initial cuing for safe hand placement during task during each trial.  R-lateral steps X 1 during first trial and then X 2 during 2nd trial with MOD A for maintaining dynamic standing balance and for RW management during task.      Activities of Daily Living:  · Found with loose stool staining padding requiring TOTAL A for front/back natividad hygiene while in supine and side lying with fresh pads placed at this this time.        [facility] 6 Click ADL: 13    Treatment & Education:  · B-rolling X 2 trials with MIN A for lower trunk management and increased cuing for bed rail use and task sequencing.  Supine>sit with MOD A for trunk management and MIN A for LE management; supine>sit with MAX A for trunk/LE management.  Scooting towards HOB while supine with instruction on BLE positioning to knee flexion with assist for stabilizing heels scooting upward with approx. 1-inch of upward excursion noted and 0 upward excursion  noted at 2nd attempt requiring TOTAL A of 2 persons via draw sheet tech for task completion.  · Sit<>stand bed<>RW with MOD A of 2 persons for first trial and MIN A of 2 persons for 2nd trial requiring approx. 3-minute rest break between trials; increased initial cuing for safe hand placement during task during each trial.  R-lateral steps X 1 during first trial and then X 2 during 2nd trial with MOD A for maintaining dynamic standing balance and for RW management during task.    · Found with loose stool staining padding requiring TOTAL A for front/back natividad hygiene while in supine and side lying with fresh pads placed at this this time.    · BUE exercise for forward punches X 5, shoulder flex/ext X 8, scapular retraction X 10, and small circumduction with extended BUE at less than 90 degrees in scapular plane X 10 with initial instruction/demos for proper form, cuing for self pacing requiring short seated rest breaks between sets, and tactile cuing for increased ROM for scapular retraction; all for BUE strengthening and endurance needed to increase independence in ADL/functional transfers.    Patient left HOB elevated with all lines intact, call button in reach and nursing notifiedEducation:      GOALS:   Multidisciplinary Problems     Occupational Therapy Goals        Problem: Occupational Therapy Goal    Goal Priority Disciplines Outcome Interventions   Occupational Therapy Goal     OT, PT/OT Ongoing, Progressing    Description: Goals to be met by: 3/21/2022    Patient will increase functional independence with ADLs by performing:    UE Dressing with Minimal Assistance.  LE Dressing with Maximum Assistance.  Grooming while standing at sink with Minimal Assistance.  Toileting from toilet with Minimal Assistance for hygiene and clothing management.   Toilet transfer to toilet with Minimal Assistance.  Step transfer with Minimal Assistance.                     Time Tracking:     OT Date of Treatment: 03/16/22  OT  Start Time: 1440  OT Stop Time: 1519  OT Total Time (min): 39 min (overlap with PTA)    Billable Minutes:Self Care/Home Management 15  Therapeutic Exercise 8    OT/MIGUEL: OT          3/16/2022

## 2022-03-16 NOTE — PROGRESS NOTES
[facility] Medicine  Progress Note    Patient Name: Pearl Villafana  MRN: [id number]  Patient Class: IP- Inpatient   Admission Date: 3/12/2022  Length of Stay: 4 days  Attending Physician: Juan Lala MD  Primary Care Provider: Cleopatra Whaley MD        Subjective:     Principal Problem:Sepsis        HPI:  Calista Kang is an 81-year-old female with a past medical history of alcohol abuse, polypharmacy, colon cancer, breast cancer and hyponatremia along with diabetes hyper tension hyperlipidemia B12 deficiency and gout.  Her family contacted EMS to do a welfare check and they found her down on the ground with altered mental status in urine and feces.  Length of time down on known.  Patient is arousable to tactile touch in the bed.  She is able to tell her name her location and her date of birth.  She does follow simple commands.  Patient answers no to most questions.  She does name objects correctly.  Patient is hypothermic at 95.5° and tachycardic at 104 with moderate bacteriuria in her urinalysis. Code sepsis was called patient received a bolus of normal saline vancomycin and Zosyn.  No signs of respiratory distress.  Patient has some bruising to her right right lateral abdominal area.  No abdominal distension.  No tenderness or guarding to abdominal area.  Patient denies pain.  Patient reports she does not know why mg for how long.  She says that she remembers eating breakfast yesterday and going to some doctor appointments.  There is no family at bedside and her 's phone number is disconnected.  There are no other family members phone numbers in her chart.  Unable to confirm her history or her medications at this time.  Patient admitted to hospital medicine for further workup.      Overview/Hospital Course:  See below      Interval History: Patient is awake and alert this morning.  She didn't sleep well overnight,  She had one loose stool yesterday.  She states she did not  sleep well overnight.  She did not work with PT yesterday - discussed need to participate more.  She is interested in SNF - awaiting placement.    Review of Systems   Constitutional:  Negative for appetite change, chills and fever.   HENT:  Negative for ear pain.    Eyes:  Negative for pain.   Respiratory:  Negative for cough and shortness of breath.    Cardiovascular:  Negative for chest pain, palpitations and leg swelling.   Gastrointestinal:  Positive for diarrhea (improving). Negative for abdominal distention, abdominal pain, constipation, nausea and vomiting.   Genitourinary:  Negative for difficulty urinating and dysuria.   Musculoskeletal:  Negative for neck pain.   Skin:  Negative for rash.   Neurological:  Negative for dizziness and headaches.   Psychiatric/Behavioral:  Negative for agitation, behavioral problems and confusion.    Objective:     Vital Signs (Most Recent):  Temp: 98.1 °F (36.7 °C) (03/16/22 0811)  Pulse: 63 (03/16/22 0813)  Resp: 18 (03/16/22 0811)  BP: (!) 150/70 (03/16/22 0813)  SpO2: 97 % (03/16/22 0811) Vital Signs (24h Range):  Temp:  [98.1 °F (36.7 °C)-99 °F (37.2 °C)] 98.1 °F (36.7 °C)  Pulse:  [58-77] 63  Resp:  [16-18] 18  SpO2:  [95 %-99 %] 97 %  BP: (129-170)/(58-71) 150/70     Weight: 68.5 kg (151 lb)  Body mass index is 24.37 kg/m².    Intake/Output Summary (Last 24 hours) at 3/16/2022 0829  Last data filed at 3/16/2022 0630  Gross per 24 hour   Intake 6366.35 ml   Output 2200 ml   Net 4166.35 ml        Physical Exam  Constitutional:       Appearance: Normal appearance.   HENT:      Head: Normocephalic and atraumatic.      Right Ear: External ear normal.      Left Ear: External ear normal.      Nose: Nose normal.      Mouth/Throat:      Mouth: Mucous membranes are moist.   Eyes:      General: No scleral icterus.     Pupils: Pupils are equal, round, and reactive to light.   Cardiovascular:      Rate and Rhythm: Normal rate and regular rhythm.      Pulses: Normal pulses.       Heart sounds: Normal heart sounds.   Pulmonary:      Effort: Pulmonary effort is normal.      Breath sounds: Normal breath sounds. No rales.   Abdominal:      General: Abdomen is flat. Bowel sounds are normal. There is no distension.      Palpations: Abdomen is soft.      Tenderness: There is no abdominal tenderness.   Musculoskeletal:         General: Normal range of motion.      Cervical back: Normal range of motion and neck supple.      Right lower leg: No edema.      Left lower leg: No edema.   Skin:     General: Skin is warm and dry.   Neurological:      General: No focal deficit present.      Mental Status: She is alert and oriented to person, place, and time. Mental status is at baseline.      Cranial Nerves: No cranial nerve deficit.      Motor: No weakness.   Psychiatric:         Mood and Affect: Mood is depressed. Affect is blunt.       Significant Labs: All pertinent labs within the past 24 hours have been reviewed.    Significant Imaging: I have reviewed all pertinent imaging results/findings within the past 24 hours.      Assessment/Plan:      * Sepsis  Patient presented after family contacted EMS to do a welfare check - they found her down on the ground with altered mental status in urine and feces.  Length of time down on known.  Patient was hypothermic at 95.5° and tachycardic at 104 with moderate bacteriuria in her urinalysis.  Code sepsis was called in ED and patient received a bolus of normal saline, as well as IV Vancomycin and Zosyn. Chest x-ray was negative for any consolidation or pleural effusions.  Concern for UTI due to moderate bacteriuria on analysis.  WBC was elevated at 14.77 with left shift, Procalcitonin of 0.34, normal lactic acid, normal ammonia, negative ETOH and Tox screen.  Blood cultures growing 1/4 bottles with Coag-negative Staph (likely contaminant)  and Urine culture was negative. Antibiotics were d/c on 3/14/2022.  Failed Voiding Trial yesterday and Zayas replaced  overnight.  Patient is awake and oriented to person, place, month, year, but not to President.  Can do serial 7s to 14.  Awaiting SNF placement.    Plan:  Follow off antibiotics for now.  PT consulted - recommend SNF - patient agrees  Continue Zayas with repeat Voiding Trial while at SNF  Consult Virtual  Service    Acute renal failure superimposed on stage 3 chronic kidney disease  Acute kidney injury-likely prerenal - Creatinine 3.4 on admission and improved with IV hydration and now 0.8.  -Monitor            Hyponatremia  Sodium was 128 on admission and improved to 135 today after IV hydration. Patient has a history of hyponatremia due to alcoholism.    -Will monitor       Altered mental status, unspecified  Patient has a past medical history of polypharmacy and alcohol abuse.  She was found down on the ground.  Her CT of her head was negative for hemorrhage or evidence of acute infarcts. Altered mental status likely multifactorial.  Back to her baseline now.      ETOH abuse  Elevated beta hydroxybutyrate.  Ethanol less than 10. Last drink on known.  Patient with history of beer potamania.  Stable without need for benzodiazepine treatment.  -Monitor CIWA scale every 4 hours.      Diabetes mellitus type II  Home Meds:  Metformin 500 mg daily at home per chart.    A1c is 6.6  -Continue to hold Metformin at this time.    -Continue Low-dose correction sliding scale       Decubitus ulcer of back, stage 1  -Continue with wound care      Colon cancer  History of colon cancer and breast cancer.  Occult stool neg.  H&H stable.  Will monitor.      Hyperlipidemia  On Crestor as outpatient - held this time due to elevation of liver enzymes.      Hypertension  Home Meds:  Losartan 25 mg daily and Metoprolol 25 mg b.i.d..     SBP stable  -Continue with Home Meds  -Monitor and adjust as needed      VTE Risk Mitigation (From admission, onward)         Ordered     Reason for No Pharmacological VTE Prophylaxis  Once         Question:  Reasons:  Answer:  Active Bleeding    03/12/22 2041     IP VTE HIGH RISK PATIENT  Once         03/12/22 2041     Place sequential compression device  Until discontinued         03/12/22 2041                Discharge Planning   SHANIKA:      Code Status: Full Code   Is the patient medically ready for discharge?:     Reason for patient still in hospital (select all that apply): Patient trending condition, Treatment and Consult recommendations  Discharge Plan A: Skilled Nursing Facility                  Juan Lala MD  Department of Hospital Medicine   [facility])

## 2022-03-16 NOTE — PROGRESS NOTES
Pharmacokinetic Assessment Follow Up: IV Vancomycin    Vancomycin serum concentration assessment(s):    The random level was drawn correctly and can be used to guide therapy at this time. The measurement is below the desired definitive target range 20   Vancomycin Regimen Plan:     Give one time dose of Vancomycin 1250mg IVPB.  Re-dose when the random level is less than 20 mcg/mL, next level to be drawn 03/17 0430    Vancomycin Regimen Plan:  15mg/kg  once today.    Re-dose when the random level is less than 20 mcg/mL, next level to be drawn at 0430 on 3/17    Drug levels (last 3 results):  Recent Labs   Lab Result Units 03/13/22  2300 03/15/22  0616 03/16/22  0935   Vancomycin, Random ug/mL 5.4 9.2 11.4       Pharmacy will continue to follow and monitor vancomycin.    Please contact pharmacy at extension [phone] for questions regarding this assessment.    Thank you for the consult,   Selina Champagne       Patient brief summary:  Pearl Villafana is a 81 y.o. female initiated on antimicrobial therapy with IV Vancomycin for treatment of bacteremia    The patient's current regimen is pulse dosing    Drug Allergies:   Review of patient's allergies indicates:  No Known Allergies    Actual Body Weight:   68.5 kg    Renal Function:   Estimated Creatinine Clearance: 52.5 mL/min (based on SCr of 0.8 mg/dL).,     Dialysis Method (if applicable):  N/A    CBC (last 72 hours):  Recent Labs   Lab Result Units 03/14/22  0517 03/15/22  0616   WBC K/uL 9.90 8.46   Hemoglobin g/dL 11.1* 11.0*   Hematocrit % 33.3* 33.9*   Platelets K/uL 162 149*       Metabolic Panel (last 72 hours):  Recent Labs   Lab Result Units 03/14/22  0517 03/15/22  0616   Sodium mmol/L 131* 135*   Potassium mmol/L 4.5 4.5   Chloride mmol/L 106 110   CO2 mmol/L 18* 19*   Glucose mg/dL 193* 150*   BUN mg/dL 46* 28*   Creatinine mg/dL 1.3 0.8   Magnesium mg/dL 1.7 1.4*   Phosphorus mg/dL 2.1* 1.9*       Vancomycin Administrations:  vancomycin given in the  last 96 hours                   vancomycin 1.25 g in dextrose 5% 250 mL IVPB (ready to mix) (mg) 1,250 mg New Bag 03/15/22 1016    vancomycin in dextrose 5 % 1 gram/250 mL IVPB 1,000 mg (mg) 1,000 mg New Bag 03/14/22 0105    vancomycin in dextrose 5 % 1 gram/250 mL IVPB 1,000 mg (mg) 1,000 mg New Bag 03/12/22 1929                Microbiologic Results:  Microbiology Results (last 7 days)     Procedure Component Value Units Date/Time    Blood culture x two cultures. Draw prior to antibiotics. [[id number]]  (Abnormal) Collected: 03/12/22 1749    Order Status: Completed Specimen: Blood from Peripheral, Upper Arm, Right Updated: 03/16/22 0927     Blood Culture, Routine Gram stain aer bottle: Gram positive cocci in clusters resembling Staph       Results called to and read back by: MD ENRICO RN  03/14/2022  10:56      COAGULASE-NEGATIVE STAPHYLOCOCCUS SPECIES  Organism is a probable contaminant      Narrative:      Aerobic and anaerobic    Blood culture x two cultures. Draw prior to antibiotics. [[id number]] Collected: 03/12/22 1755    Order Status: Completed Specimen: Blood from Peripheral, Hand, Left Updated: 03/15/22 2212     Blood Culture, Routine No Growth to date      No Growth to date      No Growth to date      No Growth to date    Narrative:      Aerobic and anaerobic    Clostridium difficile EIA [[id number]] Collected: 03/14/22 0815    Order Status: Completed Specimen: Stool Updated: 03/14/22 1346     C. diff Antigen Negative     C difficile Toxins A+B, EIA Negative     Comment: Testing not recommended for children <24 months old.       Urine Culture High Risk [[id number]] Collected: 03/13/22 0028    Order Status: Completed Specimen: Urine, Catheterized Updated: 03/14/22 1211     Urine Culture, Routine No growth    Narrative:      Indicated criteria for high risk culture:->Other  Other (specify):->Elderly, septic, history of UTIs, moderate  bacteria on analysis    Influenza A & B by Molecular [[id number]]  Collected: 03/12/22 1902    Order Status: Sent Specimen: Nasopharyngeal Swab Updated: 03/12/22 1902

## 2022-03-16 NOTE — SUBJECTIVE & OBJECTIVE
Interval History: Patient is awake and alert this morning.  She didn't sleep well overnight,  She had one loose stool yesterday.  She states she did not sleep well overnight.  She did not work with PT yesterday - discussed need to participate more.  She is interested in SNF - awaiting placement.    Review of Systems   Constitutional:  Negative for appetite change, chills and fever.   HENT:  Negative for ear pain.    Eyes:  Negative for pain.   Respiratory:  Negative for cough and shortness of breath.    Cardiovascular:  Negative for chest pain, palpitations and leg swelling.   Gastrointestinal:  Positive for diarrhea (improving). Negative for abdominal distention, abdominal pain, constipation, nausea and vomiting.   Genitourinary:  Negative for difficulty urinating and dysuria.   Musculoskeletal:  Negative for neck pain.   Skin:  Negative for rash.   Neurological:  Negative for dizziness and headaches.   Psychiatric/Behavioral:  Negative for agitation, behavioral problems and confusion.    Objective:     Vital Signs (Most Recent):  Temp: 98.1 °F (36.7 °C) (03/16/22 0811)  Pulse: 63 (03/16/22 0813)  Resp: 18 (03/16/22 0811)  BP: (!) 150/70 (03/16/22 0813)  SpO2: 97 % (03/16/22 0811) Vital Signs (24h Range):  Temp:  [98.1 °F (36.7 °C)-99 °F (37.2 °C)] 98.1 °F (36.7 °C)  Pulse:  [58-77] 63  Resp:  [16-18] 18  SpO2:  [95 %-99 %] 97 %  BP: (129-170)/(58-71) 150/70     Weight: 68.5 kg (151 lb)  Body mass index is 24.37 kg/m².    Intake/Output Summary (Last 24 hours) at 3/16/2022 0829  Last data filed at 3/16/2022 0630  Gross per 24 hour   Intake 6366.35 ml   Output 2200 ml   Net 4166.35 ml        Physical Exam  Constitutional:       Appearance: Normal appearance.   HENT:      Head: Normocephalic and atraumatic.      Right Ear: External ear normal.      Left Ear: External ear normal.      Nose: Nose normal.      Mouth/Throat:      Mouth: Mucous membranes are moist.   Eyes:      General: No scleral icterus.     Pupils:  Pupils are equal, round, and reactive to light.   Cardiovascular:      Rate and Rhythm: Normal rate and regular rhythm.      Pulses: Normal pulses.      Heart sounds: Normal heart sounds.   Pulmonary:      Effort: Pulmonary effort is normal.      Breath sounds: Normal breath sounds. No rales.   Abdominal:      General: Abdomen is flat. Bowel sounds are normal. There is no distension.      Palpations: Abdomen is soft.      Tenderness: There is no abdominal tenderness.   Musculoskeletal:         General: Normal range of motion.      Cervical back: Normal range of motion and neck supple.      Right lower leg: No edema.      Left lower leg: No edema.   Skin:     General: Skin is warm and dry.   Neurological:      General: No focal deficit present.      Mental Status: She is alert and oriented to person, place, and time. Mental status is at baseline.      Cranial Nerves: No cranial nerve deficit.      Motor: No weakness.   Psychiatric:         Mood and Affect: Mood is depressed. Affect is blunt.       Significant Labs: All pertinent labs within the past 24 hours have been reviewed.    Significant Imaging: I have reviewed all pertinent imaging results/findings within the past 24 hours.

## 2022-03-16 NOTE — PLAN OF CARE
Problem: Infection  Goal: Absence of Infection Signs and Symptoms  Outcome: Ongoing, Progressing     Problem: Adult Inpatient Plan of Care  Goal: Plan of Care Review  Outcome: Ongoing, Progressing  Goal: Patient-Specific Goal (Individualized)  Outcome: Ongoing, Progressing  Goal: Absence of Hospital-Acquired Illness or Injury  Outcome: Ongoing, Progressing  Goal: Optimal Comfort and Wellbeing  Outcome: Ongoing, Progressing  Goal: Readiness for Transition of Care  Outcome: Ongoing, Progressing     Problem: Diabetes Comorbidity  Goal: Blood Glucose Level Within Targeted Range  Outcome: Ongoing, Progressing     Problem: Adjustment to Illness (Sepsis/Septic Shock)  Goal: Optimal Coping  Outcome: Ongoing, Progressing     Problem: Bleeding (Sepsis/Septic Shock)  Goal: Absence of Bleeding  Outcome: Ongoing, Progressing     Problem: Glycemic Control Impaired (Sepsis/Septic Shock)  Goal: Blood Glucose Level Within Desired Range  Outcome: Ongoing, Progressing

## 2022-03-16 NOTE — ASSESSMENT & PLAN NOTE
Acute kidney injury-likely prerenal - Creatinine 3.4 on admission and improved with IV hydration and now 0.8.  -Monitor

## 2022-03-16 NOTE — ASSESSMENT & PLAN NOTE
Elevated beta hydroxybutyrate.  Ethanol less than 10. Last drink on known.  Patient with history of beer potamania.  Stable without need for benzodiazepine treatment.  -Monitor CIWA scale every 4 hours.

## 2022-03-16 NOTE — PT/OT/SLP PROGRESS
"Physical Therapy Treatment    Patient Name:  Pearl Villafana   MRN:  [id number]    Recommendations:     Discharge Recommendations:  nursing facility, skilled   Discharge Equipment Recommendations: walker, rolling       Assessment:     Pearl Villafana is a 81 y.o. female admitted with a medical diagnosis of Sepsis.  She presents with the following impairments/functional limitations:  weakness, impaired endurance, impaired self care skills, impaired functional mobilty, impaired balance, gait instability, decreased coordination, decreased upper extremity function, decreased lower extremity function, decreased safety awareness, decreased ROM .    Rehab Prognosis: Good; patient would benefit from acute skilled PT services to address these deficits and reach maximum level of function.    Recent Surgery: * No surgery found *      Plan:     During this hospitalization, patient to be seen 5 x/week to address the identified rehab impairments via gait training, therapeutic activities, therapeutic exercises, neuromuscular re-education and progress toward the following goals:    · Plan of Care Expires:  04/13/22    Subjective     Chief Complaint: she is currently soiled  Patient/Family Comments/goals: eager to work with therapy and mobilize. "I don't think I'll be able to walk in the hallway."  Pain/Comfort:  · Pain Rating 1: 0/10      Objective:     Communicated with nurse Claudio prior to session.  Patient found HOB elevated with mcdonald catheter, peripheral IV, telemetry upon PT entry to room.     General Precautions: Standard, fall   Orthopedic Precautions:N/A   Braces:    Respiratory Status: Room air     Functional Mobility:  · Bed Mobility:     · Rolling Left:  minimum assistance  · Rolling Right: minimum assistance  · Scooting: moderate assistance  · Bridging: A of 2 to HOB utilizing draw sheet  · Supine to Sit: moderate assistance and of 2 persons  · Sit to Supine: maximal assistance and of 2 persons  · Transfers:     · Sit to " Stand:  moderate assistance of 2 persons with rolling walker 1st trial and  minimal assistance of 2 persons with rolling walker 2nd trial  · Gait: approx 4 side steps to HOB with mod A of 2 utilizing RW (x 2 trials total with seated rest break in between)      AM-PAC 6 CLICK MOBILITY  Turning over in bed (including adjusting bedclothes, sheets and blankets)?: 3  Sitting down on and standing up from a chair with arms (e.g., wheelchair, bedside commode, etc.): 1  Moving from lying on back to sitting on the side of the bed?: 2  Moving to and from a bed to a chair (including a wheelchair)?: 2  Need to walk in hospital room?: 2  Climbing 3-5 steps with a railing?: 2  Basic Mobility Total Score: 12       Therapeutic Activities and Exercises:   Assisted pt with bed mobility as noted above and with cleaning/hygiene task and changing of bed linens (total A for hygiene)    Seated EOB BLE therex: AP, LAQ, marching x 10 reps ea     Patient left HOB elevated with all lines intact, call button in reach and nursing notified.    GOALS:   Multidisciplinary Problems       Physical Therapy Goals          Problem: Physical Therapy Goal    Goal Priority Disciplines Outcome Goal Variances Interventions   Physical Therapy Goal     PT, PT/OT Ongoing, Progressing     Description: Goals to be met by: 2022    Patient will increase functional independence with mobility by performin. Pt will be able to transfer supine to sit with SBA  2. Sit<>stand with CGA with RW.  3. Gait x 50 feet with CGA with RW.                            Time Tracking:     PT Received On: 22  PT Start Time: 1441     PT Stop Time: 1518  PT Total Time (min): 37 min     Billable Minutes: Therapeutic Activity 20 and Therapeutic Exercise 15    Treatment Type: Treatment  PT/PTA: PTA     PTA Visit Number: 1     2022

## 2022-03-16 NOTE — NURSING
Skin  Assessment completed on am shift by wound care nurse

## 2022-03-16 NOTE — ASSESSMENT & PLAN NOTE
Patient presented after family contacted EMS to do a welfare check - they found her down on the ground with altered mental status in urine and feces.  Length of time down on known.  Patient was hypothermic at 95.5° and tachycardic at 104 with moderate bacteriuria in her urinalysis.  Code sepsis was called in ED and patient received a bolus of normal saline, as well as IV Vancomycin and Zosyn. Chest x-ray was negative for any consolidation or pleural effusions.  Concern for UTI due to moderate bacteriuria on analysis.  WBC was elevated at 14.77 with left shift, Procalcitonin of 0.34, normal lactic acid, normal ammonia, negative ETOH and Tox screen.  Blood cultures growing 1/4 bottles with Coag-negative Staph (likely contaminant)  and Urine culture was negative. Antibiotics were d/c on 3/14/2022.  Failed Voiding Trial yesterday and Zayas replaced overnight.  Patient is awake and oriented to person, place, month, year, but not to President.  Can do serial 7s to 14.  Awaiting SNF placement.    Plan:  Follow off antibiotics for now.  PT consulted - recommend SNF - patient agrees  Continue Zayas with repeat Voiding Trial while at SNF  Consult Virtual  Service

## 2022-03-17 LAB
BACTERIA BLD CULT: NORMAL
POCT GLUCOSE: 175 MG/DL (ref 70–110)
POCT GLUCOSE: 266 MG/DL (ref 70–110)
POCT GLUCOSE: 277 MG/DL (ref 70–110)
PYRIDOXAL SERPL-MCNC: 24 UG/L (ref 5–50)
VANCOMYCIN SERPL-MCNC: 11 UG/ML
VIT B1 BLD-MCNC: 67 UG/L (ref 38–122)

## 2022-03-17 PROCEDURE — 97535 SELF CARE MNGMENT TRAINING: CPT

## 2022-03-17 PROCEDURE — 25000003 PHARM REV CODE 250: Performed by: NURSE PRACTITIONER

## 2022-03-17 PROCEDURE — 99232 SBSQ HOSP IP/OBS MODERATE 35: CPT | Mod: 95,,, | Performed by: INTERNAL MEDICINE

## 2022-03-17 PROCEDURE — 80202 ASSAY OF VANCOMYCIN: CPT | Performed by: INTERNAL MEDICINE

## 2022-03-17 PROCEDURE — 25000003 PHARM REV CODE 250: Performed by: INTERNAL MEDICINE

## 2022-03-17 PROCEDURE — 97110 THERAPEUTIC EXERCISES: CPT

## 2022-03-17 PROCEDURE — 97530 THERAPEUTIC ACTIVITIES: CPT

## 2022-03-17 PROCEDURE — 63600175 PHARM REV CODE 636 W HCPCS: Performed by: NURSE PRACTITIONER

## 2022-03-17 PROCEDURE — 99232 PR SUBSEQUENT HOSPITAL CARE,LEVL II: ICD-10-PCS | Mod: 95,,, | Performed by: INTERNAL MEDICINE

## 2022-03-17 PROCEDURE — 11000001 HC ACUTE MED/SURG PRIVATE ROOM

## 2022-03-17 PROCEDURE — 36415 COLL VENOUS BLD VENIPUNCTURE: CPT | Performed by: INTERNAL MEDICINE

## 2022-03-17 RX ORDER — LOSARTAN POTASSIUM 25 MG/1
25 TABLET ORAL ONCE
Status: COMPLETED | OUTPATIENT
Start: 2022-03-17 | End: 2022-03-17

## 2022-03-17 RX ORDER — LIDOCAINE 50 MG/G
1 PATCH TOPICAL
Status: DISCONTINUED | OUTPATIENT
Start: 2022-03-17 | End: 2022-03-28 | Stop reason: HOSPADM

## 2022-03-17 RX ORDER — LOPERAMIDE HYDROCHLORIDE 2 MG/1
2 CAPSULE ORAL 4 TIMES DAILY PRN
Status: DISCONTINUED | OUTPATIENT
Start: 2022-03-17 | End: 2022-03-28 | Stop reason: HOSPADM

## 2022-03-17 RX ORDER — LOSARTAN POTASSIUM 50 MG/1
50 TABLET ORAL DAILY
Status: DISCONTINUED | OUTPATIENT
Start: 2022-03-18 | End: 2022-03-20

## 2022-03-17 RX ADMIN — LIDOCAINE 5% 1 PATCH: 700 PATCH TOPICAL at 02:03

## 2022-03-17 RX ADMIN — METOPROLOL TARTRATE 25 MG: 25 TABLET, FILM COATED ORAL at 08:03

## 2022-03-17 RX ADMIN — SODIUM CHLORIDE: 0.9 INJECTION, SOLUTION INTRAVENOUS at 06:03

## 2022-03-17 RX ADMIN — INSULIN ASPART 3 UNITS: 100 INJECTION, SOLUTION INTRAVENOUS; SUBCUTANEOUS at 04:03

## 2022-03-17 RX ADMIN — INSULIN ASPART 1 UNITS: 100 INJECTION, SOLUTION INTRAVENOUS; SUBCUTANEOUS at 10:03

## 2022-03-17 RX ADMIN — MUPIROCIN: 20 OINTMENT TOPICAL at 08:03

## 2022-03-17 RX ADMIN — MAGNESIUM OXIDE TAB 400 MG (241.3 MG ELEMENTAL MG) 400 MG: 400 (241.3 MG) TAB at 08:03

## 2022-03-17 RX ADMIN — LOSARTAN POTASSIUM 25 MG: 25 TABLET, FILM COATED ORAL at 08:03

## 2022-03-17 RX ADMIN — LOSARTAN POTASSIUM 25 MG: 25 TABLET, FILM COATED ORAL at 09:03

## 2022-03-17 RX ADMIN — LOPERAMIDE HYDROCHLORIDE 2 MG: 2 CAPSULE ORAL at 04:03

## 2022-03-17 RX ADMIN — VANCOMYCIN HYDROCHLORIDE 1250 MG: 1.25 INJECTION, POWDER, LYOPHILIZED, FOR SOLUTION INTRAVENOUS at 12:03

## 2022-03-17 NOTE — NURSING
MD discontinued Vancomycin as she does not qualify for it. A&O*4. PT and OT worked together. Turn scheduled. SCD on. Mepilex on the feet and back changed. Complaints of pain with activity and movement. Goes to no pain with minimized movement.

## 2022-03-17 NOTE — ASSESSMENT & PLAN NOTE
- Trending up  - Losartan increased to 50 mg daily  - Continue metoprolol 25 PO BID  -Monitor and adjust as needed

## 2022-03-17 NOTE — PLAN OF CARE
CM informed by Xin/Roslyn that pt was accepted. Multiple phone calls from Denise at [facility] requesting additional information. All requested information provided.     With each phone conversation, CM asked if auth had been submitted, with various answers request more info.    LUISA spoke to Denise again and am imformed that pt has not been formally approved.  Denise will provide information in the morning reqarding acceptance.    No additional accepting facilities as yet.   03/17/22 2445   Discharge Reassessment   Assessment Type Discharge Planning Reassessment   Did the patient's condition or plan change since previous assessment? No   Discharge Plan discussed with: Patient;Adult children   Communicated SHANIKA with patient/caregiver Date not available/Unable to determine   Discharge Plan A Skilled Nursing Facility   Discharge Plan B Skilled Nursing Facility   DME Needed Upon Discharge  none

## 2022-03-17 NOTE — PT/OT/SLP PROGRESS
Occupational Therapy   Treatment    Name: Pearl Villafana  MRN: [id number]  Admitting Diagnosis:  Sepsis       Recommendations:     Discharge Recommendations: nursing facility, skilled  Discharge Equipment Recommendations:   (defer to next leve of care)  Barriers to discharge:   (current functional lvel)    Assessment:     Pearl Villafana is a 81 y.o. female with a medical diagnosis of Sepsis.  She presents agreeable to therapy. Performance deficits affecting function are weakness, impaired endurance, impaired self care skills, impaired functional mobilty, impaired balance, pain, edema, impaired cardiopulmonary response to activity.     Pt with improved indep during all mobility with increased EOB activities and tolerance. Pt with reports of L neck/shoulder pain with tightness noted. Edema in RUE with coban wrapping tight and painful-removed and RUE elevated. Recommend continued OT services with SNF to maximize safe indep during all tasks.     Rehab Prognosis:  Fair; patient would benefit from acute skilled OT services to address these deficits and reach maximum level of function.       Plan:     Patient to be seen 5 x/week to address the above listed problems via self-care/home management, therapeutic activities, therapeutic exercises  · Plan of Care Expires: 04/13/22  · Plan of Care Reviewed with: patient    Subjective     Pain/Comfort:  · Pain Rating 1: 10/10  · Location - Side 1: Left  · Location - Orientation 1: generalized  · Location 1: shoulder  · Pain Addressed 1: Reposition, Distraction, Cessation of Activity  · Pain Rating Post-Intervention 1: 10/10    Objective:     Communicated with: RN MD  prior to session.  Patient found HOB elevated with peripheral IV, telemetry, mcdonald catheter, pressure relief boots upon OT entry to room.    General Precautions: Standard, fall   Orthopedic Precautions:N/A   Braces: N/A  Respiratory Status: Room air     Occupational Performance:     Bed Mobility:    · Patient completed  Scooting/Bridging with maximal assistance, 2 persons and drawsheet to [facility]  · Patient completed Supine to Sit with moderate assistance  · Patient completed Sit to Supine with moderate assistance     Functional Mobility/Transfers:  · Patient completed Sit <> Stand Transfer with minimum assistance  with  rolling walker   · Functional Mobility: Min A side step to HOB     Activities of Daily Living:  · Grooming: supervision seated EOB     Therapeutic Exercises:  · Shoulder rolls forward/backward  · Shoulder flex/ext  · Arm rolls forward/backward      AMPAC 6 Click ADL: 14    Treatment & Education:  Pt participated in OT tx session with ADLS & functional mobility performed as documented above; UE exercises EOB .  Education provided on role of OT including safe performance of self-care tasks, mobility techniques, safe use of DME, and encouragement of mobilization during hospitalization to prevent/limit deconditioning as well as discharge recommendations     Patient left [facility] elevated with all lines intact, call button in reach and RN notifiedEducation:      GOALS:   Multidisciplinary Problems     Occupational Therapy Goals        Problem: Occupational Therapy Goal    Goal Priority Disciplines Outcome Interventions   Occupational Therapy Goal     OT, PT/OT Ongoing, Progressing    Description: Goals to be met by: 3/21/2022    Patient will increase functional independence with ADLs by performing:    UE Dressing with Minimal Assistance.  LE Dressing with Maximum Assistance.  Grooming while standing at sink with Minimal Assistance.  Toileting from toilet with Minimal Assistance for hygiene and clothing management.   Toilet transfer to toilet with Minimal Assistance.  Step transfer with Minimal Assistance.                     Time Tracking:     OT Date of Treatment: 03/17/22  OT Start Time: 1059  OT Stop Time: 1123  OT Total Time (min): 24 min    Billable Minutes:Self Care/Home Management 15  Therapeutic Activity 9   Overlap with  PT for portions of session due to complex nature of patient and for safety with mobility and performance of self-care tasks to decrease fall risk as well as patient and caregiver injury as pt requires two skilled therapists to provide interventions.      OT/MIGUEL: OT          3/17/2022

## 2022-03-17 NOTE — PROGRESS NOTES
[facility] - Med Surg ([place])  Adult Nutrition  Progress Note    SUMMARY       Recommendations  1.Recommend SLP consult- to help with correct modifications for diet.   2.Continue Boost Plus TID.   3.Encourage good PO intake.   4. Recommend Boost Pudding BID and Aman BID to promote wound healing as tolerated.    Goals: Pt to advance diet with texture/ consistency as tolerated with ONS by RD f/u.  Nutrition Goal Status: progressing towards goal  Communication of RD Recs:  (POC)    Assessment and Plan    Inadequate oral intake  Contributing Nutrition Diagnosis  Inadequate oral intake    Related to (etiology):   Decreased appetite    Signs and Symptoms (as evidenced by):   PO intake %  mech soft/ puree diet with ONS   Increase needs- wounds    Interventions/Recommendations (treatment strategy):  Collaboration with other healthcare providers  SLP recs per diet  ONS    Nutrition Diagnosis Status:   Continues    Malnutrition Assessment     Skin (Micronutrient):  (Juan Score 14)       Reason for Assessment    Reason For Assessment: RD follow-up  Diagnosis: infection/sepsis  Relevant Medical History: alcohol abuse, polypharmacy, colon cancer, breast cancer, hyponatremia, DM, HTN, HLD, vit B12 def, gout  Interdisciplinary Rounds: did not attend  General Information Comments: 3/17- RD f/u. On puree diet. SLP not following. PO intake % noted.  Pending SNF placement. Wounds noted. Will continue to monitor.  Nutrition Discharge Planning: Pt to follow a DM/cardiac diet with texture/ consistent as tolerated.- cessation of alcohol    Nutrition Risk Screen    Nutrition Risk Screen: no indicators present    Nutrition/Diet History    Spiritual, Cultural Beliefs, Jew Practices, Values that Affect Care:  (None stated.)  Factors Affecting Nutritional Intake: decreased appetite, chewing difficulties/inability to chew food, difficulty/impaired swallowing, impaired cognitive status/motor control    Anthropometrics    Temp:  "98.7 °F (37.1 °C)  Height Method: Stated  Height: 5' 6" (167.6 cm)  Height (inches): 66 in  Weight Method: Bed Scale  Weight: 68.5 kg (151 lb)  Weight (lb): 151 lb  Ideal Body Weight (IBW), Female: 130 lb  % Ideal Body Weight, Female (lb): 116.15 %  BMI (Calculated): 24.4  BMI Grade: 18.5-24.9 - normal       Lab/Procedures/Meds    Pertinent Labs Reviewed: reviewed  Pertinent Labs Comments: Na 135, BUN 28, glu 150, Mag 1.4, H/H 11.0/33.9, phos 1.9  Pertinent Medications Reviewed: reviewed  Pertinent Medications Comments: Losartan, mag oxide, metoprolol tartrate    Estimated/Assessed Needs    Weight Used For Calorie Calculations: 68.5 kg (151 lb 0.2 oz)  Energy Calorie Requirements (kcal): 1600 kcal day (MSJ X AF 1.4)  Energy Need Method: [place]  Protein Requirements: 68-82 g day (1.0-1.2 g/kg)  Weight Used For Protein Calculations: 68.5 kg (151 lb 0.2 oz)     Estimated Fluid Requirement Method: RDA Method  RDA Method (mL): 1600  CHO Requirement: 200 gday    Nutrition Prescription Ordered    Current Diet Order: [facility] soft/ puree diet?    Evaluation of Received Nutrient/Fluid Intake    Energy Calories Required: meeting needs  Protein Required: meeting needs  Fluid Required: meeting needs  % Intake of Estimated Energy Needs: 75 - 100 %  % Meal Intake: 75 - 100 %    Nutrition Risk    Level of Risk/Frequency of Follow-up:  (1-2 x weekly)     Monitor and Evaluation    Food and Nutrient Intake: energy intake, food and beverage intake  Food and Nutrient Adminstration: diet order  Knowledge/Beliefs/Attitudes: food and nutrition knowledge/skill, beliefs and attitudes  Physical Activity and Function: nutrition-related ADLs and IADLs  Anthropometric Measurements: weight, weight change, body mass index, growth pattern indices/percentile ranks  Biochemical Data, Medical Tests and Procedures: glucose/endocrine profile, lipid profile, electrolyte and renal panel, gastrointestinal profile, inflammatory " profile  Nutrition-Focused Physical Findings: overall appearance     Nutrition Follow-Up    RD Follow-up?: Yes

## 2022-03-17 NOTE — PROGRESS NOTES
"      [facility] Medicine  Telemedicine Progress Note    Patient Name: Pearl Villafana  MRN: [id number]  Patient Class: IP- Inpatient   Admission Date: 3/12/2022  Length of Stay: 5 days  Attending Physician: Drake Villarreal MD  Primary Care Provider: Cleopatra Whaley MD          Subjective:     Principal Problem:Sepsis        HPI:  Calista Kang is an 81-year-old female with a past medical history of alcohol abuse, polypharmacy, colon cancer, breast cancer and hyponatremia along with diabetes hyper tension hyperlipidemia B12 deficiency and gout.  Her family contacted EMS to do a welfare check and they found her down on the ground with altered mental status in urine and feces.  Length of time down on known.  Patient is arousable to tactile touch in the bed.  She is able to tell her name her location and her date of birth.  She does follow simple commands.  Patient answers no to most questions.  She does name objects correctly.  Patient is hypothermic at 95.5° and tachycardic at 104 with moderate bacteriuria in her urinalysis. Code sepsis was called patient received a bolus of normal saline vancomycin and Zosyn.  No signs of respiratory distress.  Patient has some bruising to her right right lateral abdominal area.  No abdominal distension.  No tenderness or guarding to abdominal area.  Patient denies pain.  Patient reports she does not know why mg for how long.  She says that she remembers eating breakfast yesterday and going to some doctor appointments.  There is no family at bedside and her 's phone number is disconnected.  There are no other family members phone numbers in her chart.  Unable to confirm her history or her medications at this time.  Patient admitted to hospital medicine for further workup.      Overview/Hospital Course:  See below      Interval History:    Ms. Villafana feels "Ok," with no new complaints or issues. No more loose stools. I increased her losartan, " otherwise no changes stable awaiting placement.    Review of Systems   Constitutional:  Positive for fatigue. Negative for chills and fever.   Respiratory:  Negative for cough and shortness of breath.    Cardiovascular:  Negative for chest pain and leg swelling.   Gastrointestinal:  Positive for diarrhea (improved). Negative for abdominal pain, constipation, nausea and vomiting.   Neurological:  Positive for weakness. Negative for numbness.   Objective:     Vital Signs (Most Recent):  Temp: 97.9 °F (36.6 °C) (03/17/22 1141)  Pulse: 69 (03/17/22 1400)  Resp: 16 (03/17/22 1141)  BP: (!) 165/73 (03/17/22 1141)  SpO2: 98 % (03/17/22 1141)   Vital Signs (24h Range):  Temp:  [97.9 °F (36.6 °C)-99.5 °F (37.5 °C)] 97.9 °F (36.6 °C)  Pulse:  [64-81] 69  Resp:  [12-18] 16  SpO2:  [95 %-98 %] 98 %  BP: (151-193)/(71-85) 165/73     Weight: 68.5 kg (151 lb)  Body mass index is 24.37 kg/m².    Intake/Output Summary (Last 24 hours) at 3/17/2022 1420  Last data filed at 3/17/2022 1134  Gross per 24 hour   Intake 1514.08 ml   Output 600 ml   Net 914.08 ml      Physical Exam  Constitutional:       General: She is not in acute distress.  HENT:      Head: Normocephalic and atraumatic.   Eyes:      General: No scleral icterus.        Right eye: No discharge.         Left eye: No discharge.   Pulmonary:      Effort: Pulmonary effort is normal.      Comments: Breathing comfortably  Abdominal:      General: There is no distension.   Musculoskeletal:      Comments: Moves all extremities well   Skin:     Findings: No erythema or rash.   Neurological:      General: No focal deficit present.      Mental Status: She is alert and oriented to person, place, and time.      Comments: Alert, conversant   Psychiatric:         Mood and Affect: Mood normal.         Behavior: Behavior normal.       Significant Labs: All pertinent labs within the past 24 hours have been reviewed.    Significant Imaging: I have reviewed all pertinent imaging  results/findings within the past 24 hours.      Assessment/Plan:      * Sepsis  Patient presented after family contacted EMS to do a welfare check - they found her down on the ground with altered mental status in urine and feces.  Length of time down on known.  Patient was hypothermic at 95.5° and tachycardic at 104 with moderate bacteriuria in her urinalysis.  Code sepsis was called in ED and patient received a bolus of normal saline, as well as IV Vancomycin and Zosyn. Chest x-ray was negative for any consolidation or pleural effusions.  Concern for UTI due to moderate bacteriuria on analysis.  WBC was elevated at 14.77 with left shift, Procalcitonin of 0.34, normal lactic acid, normal ammonia, negative ETOH and Tox screen.  Blood cultures growing 1/4 bottles with Coag-negative Staph (likely contaminant)  and Urine culture was negative. Antibiotics were d/c on 3/14/2022.  Failed Voiding Trial yesterday and Zayas replaced overnight.  Patient is awake and oriented to person, place, month, year, but not to President.  Can do serial 7s to 14.  Awaiting SNF placement.    Plan:  Follow off antibiotics for now - discontinued pharmacy consult order  PT consulted - recommend SNF - patient agrees  Continue Zayas with repeat Voiding Trial while at SNF    Decubitus ulcer of back, stage 1  -Continue with wound care      ETOH abuse  Elevated beta hydroxybutyrate.  Ethanol less than 10. Last drink on known.  Patient with history of beer potamania.  Stable without need for benzodiazepine treatment.  -Monitor CIWA scale every 4 hours.      Acute renal failure superimposed on stage 3 chronic kidney disease    - Resolved      Altered mental status, unspecified    - Resolved      Hyponatremia    - Resolved      Colon cancer  History of colon cancer and breast cancer.  Occult stool neg.  H&H stable.  Will monitor.      Diabetes mellitus type II    -Chronic and stable  - Home Meds:  Metformin 500 mg daily at home per chart.    A1c is  6.6  -Continue to hold Metformin at this time.    -Continue Low-dose correction sliding scale       Hyperlipidemia  On Crestor as outpatient - held this time due to elevation of liver enzymes.      Hypertension    - Trending up  - Losartan increased to 50 mg daily  - Continue metoprolol 25 PO BID  -Monitor and adjust as needed      VTE Risk Mitigation (From admission, onward)         Ordered     Reason for No Pharmacological VTE Prophylaxis  Once        Question:  Reasons:  Answer:  Active Bleeding    03/12/22 2041     IP VTE HIGH RISK PATIENT  Once         03/12/22 2041     Place sequential compression device  Until discontinued         03/12/22 2041                      I have assessed these finding virtually using telemed platform and with assistance of bedside nurse                 The attending portion of this evaluation, treatment, and documentation was performed per Drake Villarreal MD via AudioVisual Assisted with secure video- assisted technology with [employer] software platform. Both the provider and the patient were located in the hospital and utilized 2 way audio/video to document history and physical exam.     Drake Villarreal MD  Department of Hospital Medicine   [facility] - Med Surg ([place])

## 2022-03-17 NOTE — ASSESSMENT & PLAN NOTE
Contributing Nutrition Diagnosis  Inadequate oral intake    Related to (etiology):   Decreased appetite    Signs and Symptoms (as evidenced by):   PO intake %  mech soft/ puree diet with ONS   Increase needs- wounds    Interventions/Recommendations (treatment strategy):  Collaboration with other healthcare providers  SLP recs per diet  ONS    Nutrition Diagnosis Status:   Continues

## 2022-03-17 NOTE — NURSING
Pt is A&O*4. Assist*2 to ambulate.  Pt had high BP in the morning. MD notified. Discontinued the fluids and also increased the losartan dose. Complained of pain in the neck. MD notified. Ordered Lidocaine Patch. Also, had loose BM during the shift. MD notified. Ordered PRN Loperamide. Given. Will cont to monitor. Discontinued tele monitor. Zayas in place. IV infiltrated during the shift. Changed to left AC. No other distress at this moment. Will cont to monitor.

## 2022-03-17 NOTE — SUBJECTIVE & OBJECTIVE
"Interval History:    Ms. Villafana feels "Ok," with no new complaints or issues. No more loose stools. I increased her losartan, otherwise no changes stable awaiting placement.    Review of Systems   Constitutional:  Positive for fatigue. Negative for chills and fever.   Respiratory:  Negative for cough and shortness of breath.    Cardiovascular:  Negative for chest pain and leg swelling.   Gastrointestinal:  Positive for diarrhea (improved). Negative for abdominal pain, constipation, nausea and vomiting.   Neurological:  Positive for weakness. Negative for numbness.   Objective:     Vital Signs (Most Recent):  Temp: 97.9 °F (36.6 °C) (03/17/22 1141)  Pulse: 69 (03/17/22 1400)  Resp: 16 (03/17/22 1141)  BP: (!) 165/73 (03/17/22 1141)  SpO2: 98 % (03/17/22 1141)   Vital Signs (24h Range):  Temp:  [97.9 °F (36.6 °C)-99.5 °F (37.5 °C)] 97.9 °F (36.6 °C)  Pulse:  [64-81] 69  Resp:  [12-18] 16  SpO2:  [95 %-98 %] 98 %  BP: (151-193)/(71-85) 165/73     Weight: 68.5 kg (151 lb)  Body mass index is 24.37 kg/m².    Intake/Output Summary (Last 24 hours) at 3/17/2022 1420  Last data filed at 3/17/2022 1134  Gross per 24 hour   Intake 1514.08 ml   Output 600 ml   Net 914.08 ml      Physical Exam  Constitutional:       General: She is not in acute distress.  HENT:      Head: Normocephalic and atraumatic.   Eyes:      General: No scleral icterus.        Right eye: No discharge.         Left eye: No discharge.   Pulmonary:      Effort: Pulmonary effort is normal.      Comments: Breathing comfortably  Abdominal:      General: There is no distension.   Musculoskeletal:      Comments: Moves all extremities well   Skin:     Findings: No erythema or rash.   Neurological:      General: No focal deficit present.      Mental Status: She is alert and oriented to person, place, and time.      Comments: Alert, conversant   Psychiatric:         Mood and Affect: Mood normal.         Behavior: Behavior normal.       Significant Labs: All " pertinent labs within the past 24 hours have been reviewed.    Significant Imaging: I have reviewed all pertinent imaging results/findings within the past 24 hours.

## 2022-03-17 NOTE — PT/OT/SLP PROGRESS
Physical Therapy Treatment    Patient Name:  Pearl Villafana   MRN:  [id number]    Recommendations:     Discharge Recommendations:  nursing facility, skilled   Discharge Equipment Recommendations: walker, rolling   Barriers to discharge: none to SNF    Assessment:     Pearl Villafana is a 81 y.o. female admitted with a medical diagnosis of Sepsis.  She presents with the following impairments/functional limitations:  weakness, impaired endurance, impaired self care skills, impaired functional mobilty, gait instability, impaired balance, decreased lower extremity function, decreased upper extremity function, pain, decreased ROM, edema.    Pt tolerated treatment well with no adverse reactions. Pt is progressing toward meeting goals. Pt performed functional mobility and therex with goo tolerance. Demonstrated improved assistance levels required for mobility. Pt continues to be limited by left shoulder pain and generalized weakness. PT will continue to follow and progress goals as tolerated. Recommend discharge to SNF.     Rehab Prognosis: Good; patient would benefit from acute skilled PT services to address these deficits and reach maximum level of function.    Recent Surgery: * No surgery found *      Plan:     During this hospitalization, patient to be seen 5 x/week to address the identified rehab impairments via gait training, therapeutic activities, therapeutic exercises, neuromuscular re-education and progress toward the following goals:    · Plan of Care Expires:  04/13/22    Subjective     Chief Complaint: left shoulder pain, pt thinks she slept wrong  Patient/Family Comments/goals: Pt verbalized importance of therapy   Pain/Comfort:  · Pain Rating 1: 10/10  · Location - Side 1: Left  · Location - Orientation 1: generalized  · Location 1: shoulder  · Pain Addressed 1: Reposition, Distraction, Cessation of Activity  · Pain Rating Post-Intervention 1: 10/10      Objective:     Communicated with RN MD prior to session.   Patient found HOB elevated with peripheral IV, telemetry, mcdonald catheter (PRAFO) upon PT entry to room.     General Precautions: Standard, fall   Orthopedic Precautions:N/A   Braces: N/A  Respiratory Status: Room air     Functional Mobility:  · Bed Mobility:     · Supine to Sit: moderate assistance  · Sit to Supine: moderate assistance  · Transfers:     · Sit to Stand:  minimum assistance with rolling walker - 2 trials performed  · First trial - pt required seated rest break shortly after standing.   · Second trial - pt able to perform side stepping ambulation.   · Gait: x4 side steps with moderate assistance and rolling walker. Pt with decreased speed, ashli, and bilateral foot clearance. Decreased stability, however, no LOB noted. Pt required assitance with appropriate RW mgmt. Forward flexed posture, requiring verbal cues to stand erect.   · Balance: Pt sat EOB for ~20 minutes with SPV for dynamic sitting balance.     AM-PAC 6 CLICK MOBILITY  Turning over in bed (including adjusting bedclothes, sheets and blankets)?: 3  Sitting down on and standing up from a chair with arms (e.g., wheelchair, bedside commode, etc.): 3  Moving from lying on back to sitting on the side of the bed?: 2  Moving to and from a bed to a chair (including a wheelchair)?: 2  Need to walk in hospital room?: 2  Climbing 3-5 steps with a railing?: 2  Basic Mobility Total Score: 14     Therapeutic Activities and Exercises:  · Bed mobility, transfer, and gait training as described above.  · Pt sat EOB for the following therex:  · Bilateral ankle pumps (1x10)   · Bilateral long arc quads (2x12)    PT educated patient re:   · PT plan of care/role of PT  · Use of RW  · Therex  · Fall and safety precautions  · Gait deviations  · Use of call light (don't get up without assistance)  Pt verbalized understanding, however, needs reinforcement.     Patient left HOB elevated with all lines intact, call button in reach and RN notified.    GOALS:    Multidisciplinary Problems     Physical Therapy Goals        Problem: Physical Therapy Goal    Goal Priority Disciplines Outcome Goal Variances Interventions   Physical Therapy Goal     PT, PT/OT Ongoing, Progressing     Description: Goals to be met by: 2022    Patient will increase functional independence with mobility by performin. Pt will be able to transfer supine to sit with SBA  2. Sit<>stand with CGA with RW.  3. Gait x 50 feet with CGA with RW.                        Time Tracking:     PT Received On: 22  PT Start Time: 958     PT Stop Time:   PT Total Time (min): 24 min     Billable Minutes: Therapeutic Activity 14 and Therapeutic Exercise 10   Overlap with OT for portions of session due to complex nature of patient and for safety with mobility to decrease fall risk for patient and caregiver injury requiring two skilled therapists to provide interventions.     Treatment Type: Treatment  PT/PTA: PT     PTA Visit Number: 0     2022

## 2022-03-17 NOTE — CONSULTS
Thank you for your consult to [facility]. We have reviewed the patient chart. This patient does meet criteria for [facility] service at this time. Will assume care on 03/17/22 at 6AM     Kelechi Thomas MD  [facility]

## 2022-03-17 NOTE — PROGRESS NOTES
Pharmacokinetic Assessment Follow Up: IV Vancomycin    Vancomycin serum concentration assessment(s):    The random level was drawn correctly and can be used to guide therapy at this time. The measurement is within the desired definitive target range of 10 to 20 mcg/mL.    Vancomycin Regimen Plan:    Give one time dose of Vancomycin 1250mg IVPB.  Re-dose when the random level is less than 20 mcg/mL, next level to be drawn at 1200 on 3/18    Drug levels (last 3 results):  Recent Labs   Lab Result Units 03/15/22  0616 03/16/22  0935 03/17/22  1037   Vancomycin, Random ug/mL 9.2 11.4 11.0       Pharmacy will continue to follow and monitor vancomycin.    Please contact pharmacy at extension [phone] for questions regarding this assessment.    Thank you for the consult,   Tonya Dang       Patient brief summary:  Pearl Villafana is a 81 y.o. female initiated on antimicrobial therapy with IV Vancomycin for treatment of bacteremia    The patient's current regimen is pulse dosing    Drug Allergies:   Review of patient's allergies indicates:  No Known Allergies    Actual Body Weight:   68.5kg    Renal Function:   Estimated Creatinine Clearance: 52.5 mL/min (based on SCr of 0.8 mg/dL).,     Dialysis Method (if applicable):  N/A    CBC (last 72 hours):  Recent Labs   Lab Result Units 03/15/22  0616   WBC K/uL 8.46   Hemoglobin g/dL 11.0*   Hematocrit % 33.9*   Platelets K/uL 149*       Metabolic Panel (last 72 hours):  Recent Labs   Lab Result Units 03/15/22  0616   Sodium mmol/L 135*   Potassium mmol/L 4.5   Chloride mmol/L 110   CO2 mmol/L 19*   Glucose mg/dL 150*   BUN mg/dL 28*   Creatinine mg/dL 0.8   Magnesium mg/dL 1.4*   Phosphorus mg/dL 1.9*       Vancomycin Administrations:  vancomycin given in the last 96 hours                     vancomycin in dextrose 5 % 1 gram/250 mL IVPB 1,000 mg (mg) 1,000 mg New Bag 03/16/22 1126    vancomycin 1.25 g in dextrose 5% 250 mL IVPB (ready to mix) (mg) 1,250 mg New Bag 03/15/22 1016     vancomycin in dextrose 5 % 1 gram/250 mL IVPB 1,000 mg (mg) 1,000 mg New Bag 03/14/22 0105                    Microbiologic Results:  Microbiology Results (last 7 days)       Procedure Component Value Units Date/Time    Blood culture x two cultures. Draw prior to antibiotics. [[id number]] Collected: 03/12/22 1755    Order Status: Completed Specimen: Blood from Peripheral, Hand, Left Updated: 03/16/22 2212     Blood Culture, Routine No Growth to date      No Growth to date      No Growth to date      No Growth to date      No Growth to date    Narrative:      Aerobic and anaerobic    Blood culture x two cultures. Draw prior to antibiotics. [[id number]]  (Abnormal) Collected: 03/12/22 1749    Order Status: Completed Specimen: Blood from Peripheral, Upper Arm, Right Updated: 03/16/22 0927     Blood Culture, Routine Gram stain aer bottle: Gram positive cocci in clusters resembling Staph       Results called to and read back by: MD ENRICO RN  03/14/2022  10:56      COAGULASE-NEGATIVE STAPHYLOCOCCUS SPECIES  Organism is a probable contaminant      Narrative:      Aerobic and anaerobic    Clostridium difficile EIA [[id number]] Collected: 03/14/22 0815    Order Status: Completed Specimen: Stool Updated: 03/14/22 1346     C. diff Antigen Negative     C difficile Toxins A+B, EIA Negative     Comment: Testing not recommended for children <24 months old.       Urine Culture High Risk [[id number]] Collected: 03/13/22 0028    Order Status: Completed Specimen: Urine, Catheterized Updated: 03/14/22 1211     Urine Culture, Routine No growth    Narrative:      Indicated criteria for high risk culture:->Other  Other (specify):->Elderly, septic, history of UTIs, moderate  bacteria on analysis    Influenza A & B by Molecular [[id number]] Collected: 03/12/22 1902    Order Status: Sent Specimen: Nasopharyngeal Swab Updated: 03/12/22 1902

## 2022-03-17 NOTE — ASSESSMENT & PLAN NOTE
-Chronic and stable  - Home Meds:  Metformin 500 mg daily at home per chart.    A1c is 6.6  -Continue to hold Metformin at this time.    -Continue Low-dose correction sliding scale

## 2022-03-17 NOTE — PLAN OF CARE
[facility] contact daughter, Magdalena Ortez, [phone], for paperwork

## 2022-03-17 NOTE — PLAN OF CARE
Recommendations  1.Recommend SLP consult- to help with correct modifications for diet.   2.Continue Boost Plus TID.   3.Encourage good PO intake.   4. Recommend Boost Pudding BID and Aman BID to promote wound healing as tolerated.    Goals: Pt to advance diet with texture/ consistency as tolerated with ONS by RD f/u.  Nutrition Goal Status: progressing towards goal  Communication of RD Recs:  (POC)

## 2022-03-17 NOTE — ASSESSMENT & PLAN NOTE
Patient presented after family contacted EMS to do a welfare check - they found her down on the ground with altered mental status in urine and feces.  Length of time down on known.  Patient was hypothermic at 95.5° and tachycardic at 104 with moderate bacteriuria in her urinalysis.  Code sepsis was called in ED and patient received a bolus of normal saline, as well as IV Vancomycin and Zosyn. Chest x-ray was negative for any consolidation or pleural effusions.  Concern for UTI due to moderate bacteriuria on analysis.  WBC was elevated at 14.77 with left shift, Procalcitonin of 0.34, normal lactic acid, normal ammonia, negative ETOH and Tox screen.  Blood cultures growing 1/4 bottles with Coag-negative Staph (likely contaminant)  and Urine culture was negative. Antibiotics were d/c on 3/14/2022.  Failed Voiding Trial yesterday and Zayas replaced overnight.  Patient is awake and oriented to person, place, month, year, but not to President.  Can do serial 7s to 14.  Awaiting SNF placement.    Plan:  Follow off antibiotics for now - discontinued pharmacy consult order  PT consulted - recommend SNF - patient agrees  Continue Zayas with repeat Voiding Trial while at SNF

## 2022-03-18 ENCOUNTER — PATIENT OUTREACH (OUTPATIENT)
Dept: ADMINISTRATIVE | Facility: OTHER | Age: 82
End: 2022-03-18
Payer: MEDICARE

## 2022-03-18 LAB
POCT GLUCOSE: 184 MG/DL (ref 70–110)
POCT GLUCOSE: 217 MG/DL (ref 70–110)
POCT GLUCOSE: 266 MG/DL (ref 70–110)
POCT GLUCOSE: 275 MG/DL (ref 70–110)

## 2022-03-18 PROCEDURE — 25000003 PHARM REV CODE 250: Performed by: INTERNAL MEDICINE

## 2022-03-18 PROCEDURE — 97530 THERAPEUTIC ACTIVITIES: CPT | Mod: CQ

## 2022-03-18 PROCEDURE — 25000003 PHARM REV CODE 250: Performed by: NURSE PRACTITIONER

## 2022-03-18 PROCEDURE — 25000003 PHARM REV CODE 250: Performed by: PHYSICIAN ASSISTANT

## 2022-03-18 PROCEDURE — 99232 PR SUBSEQUENT HOSPITAL CARE,LEVL II: ICD-10-PCS | Mod: 95,,, | Performed by: INTERNAL MEDICINE

## 2022-03-18 PROCEDURE — 11000001 HC ACUTE MED/SURG PRIVATE ROOM

## 2022-03-18 PROCEDURE — 99232 SBSQ HOSP IP/OBS MODERATE 35: CPT | Mod: 95,,, | Performed by: INTERNAL MEDICINE

## 2022-03-18 PROCEDURE — 97110 THERAPEUTIC EXERCISES: CPT | Mod: CQ

## 2022-03-18 RX ORDER — TALC
6 POWDER (GRAM) TOPICAL NIGHTLY PRN
Status: DISCONTINUED | OUTPATIENT
Start: 2022-03-18 | End: 2022-03-28 | Stop reason: HOSPADM

## 2022-03-18 RX ADMIN — MELATONIN TAB 3 MG 6 MG: 3 TAB at 01:03

## 2022-03-18 RX ADMIN — METOPROLOL TARTRATE 25 MG: 25 TABLET, FILM COATED ORAL at 08:03

## 2022-03-18 RX ADMIN — MUPIROCIN: 20 OINTMENT TOPICAL at 08:03

## 2022-03-18 RX ADMIN — INSULIN ASPART 3 UNITS: 100 INJECTION, SOLUTION INTRAVENOUS; SUBCUTANEOUS at 05:03

## 2022-03-18 RX ADMIN — LIDOCAINE 5% 1 PATCH: 700 PATCH TOPICAL at 03:03

## 2022-03-18 RX ADMIN — ACETAMINOPHEN 650 MG: 325 TABLET, FILM COATED ORAL at 01:03

## 2022-03-18 RX ADMIN — INSULIN ASPART 2 UNITS: 100 INJECTION, SOLUTION INTRAVENOUS; SUBCUTANEOUS at 08:03

## 2022-03-18 RX ADMIN — ACETAMINOPHEN 650 MG: 325 TABLET, FILM COATED ORAL at 12:03

## 2022-03-18 RX ADMIN — LOSARTAN POTASSIUM 50 MG: 50 TABLET, FILM COATED ORAL at 08:03

## 2022-03-18 RX ADMIN — INSULIN ASPART 2 UNITS: 100 INJECTION, SOLUTION INTRAVENOUS; SUBCUTANEOUS at 12:03

## 2022-03-18 NOTE — SUBJECTIVE & OBJECTIVE
Interval History:    Ms. Villafana was stable overnight with no new symptoms. I updated her daughter at bedside. BP improved w/ losartan changes, now systolic mostly 140-150s.    Now accepted to [facility], pending [employer] auth    Review of Systems   Constitutional:  Positive for fatigue. Negative for chills and fever.   Respiratory:  Negative for cough and shortness of breath.    Cardiovascular:  Negative for chest pain and leg swelling.   Gastrointestinal:  Positive for diarrhea (improved). Negative for abdominal pain, constipation, nausea and vomiting.   Neurological:  Positive for weakness. Negative for numbness.   Objective:     Vital Signs (Most Recent):  Temp: 98.7 °F (37.1 °C) (03/18/22 1112)  Pulse: 64 (03/18/22 1112)  Resp: 16 (03/18/22 1112)  BP: (!) 142/66 (03/18/22 1112)  SpO2: 98 % (03/18/22 1112)   Vital Signs (24h Range):  Temp:  [97.9 °F (36.6 °C)-99.4 °F (37.4 °C)] 98.7 °F (37.1 °C)  Pulse:  [64-84] 64  Resp:  [12-18] 16  SpO2:  [96 %-98 %] 98 %  BP: (142-166)/(65-70) 142/66     Weight: 68.5 kg (151 lb)  Body mass index is 24.37 kg/m².    Intake/Output Summary (Last 24 hours) at 3/18/2022 1233  Last data filed at 3/18/2022 0717  Gross per 24 hour   Intake 2525.16 ml   Output 1900 ml   Net 625.16 ml        Physical Exam  Constitutional:       General: She is not in acute distress.  HENT:      Head: Normocephalic and atraumatic.   Eyes:      General: No scleral icterus.        Right eye: No discharge.         Left eye: No discharge.   Pulmonary:      Effort: Pulmonary effort is normal.      Comments: Breathing comfortably  Abdominal:      General: There is no distension.   Musculoskeletal:      Comments: Moves all extremities well   Skin:     Findings: No erythema or rash.   Neurological:      General: No focal deficit present.      Mental Status: She is alert and oriented to person, place, and time.      Comments: Alert, conversant   Psychiatric:         Mood and Affect: Mood normal.         Behavior:  Behavior normal.       Significant Labs: All pertinent labs within the past 24 hours have been reviewed.    Significant Imaging: I have reviewed all pertinent imaging results/findings within the past 24 hours.

## 2022-03-18 NOTE — ASSESSMENT & PLAN NOTE
- Improving, SBP mostly 140s-150s  - Continue losartan 50 mg daily, titrate as needed, aiming for gradual improvement  - Continue metoprolol 25 PO BID  -Monitor and adjust as needed

## 2022-03-18 NOTE — PROGRESS NOTES
03/18/2022 @ 3:22pm- RSW attempted to meet with patient.  RSW unable to complete initial visit due to pt sleeping and not responding to RSW attempt to wake pt up. RSW will follow up with patient at a later time.    MARCELLA Dang

## 2022-03-18 NOTE — PT/OT/SLP PROGRESS
"Occupational Therapy  Missed Visit    Patient Name:  Pearl Villafana   MRN:  [id number]    Patient not seen today secondary to Patient fatigue. Pt continued to decline despite max encouragement and education on benefits of participation with OT and importance of OOB activity. Pt verbalized understanding but declined stating, "I'm so tired today". Will follow-up as able.    3/18/2022  "

## 2022-03-18 NOTE — PT/OT/SLP PROGRESS
Physical Therapy Treatment    Patient Name:  Pearl Villafana   MRN:  [id number]    Recommendations:     Discharge Recommendations:  nursing facility, skilled   Discharge Equipment Recommendations: walker, rolling   Barriers to discharge: None to SNF    Assessment:     Pearl Villafana is a 81 y.o. female admitted with a medical diagnosis of Sepsis.  She presents with the following impairments/functional limitations:  weakness, pain, edema, impaired balance, impaired cardiopulmonary response to activity, impaired endurance, impaired functional mobilty, impaired self care skills.    Supine>sit with CGA  Sit>stand with RW and Nasrene  Bed>Chair with RW and Nasreen, using stand/pivot  Pt with much progress in functional mobility, bed mobility and transfer with minimal assist  Rec SNF    Rehab Prognosis: Good; patient would benefit from acute skilled PT services to address these deficits and reach maximum level of function.    Recent Surgery: * No surgery found *      Plan:     During this hospitalization, patient to be seen 5 x/week to address the identified rehab impairments via gait training, therapeutic activities, therapeutic exercises, neuromuscular re-education and progress toward the following goals:    · Plan of Care Expires:  04/13/22    Subjective     Chief Complaint: none stated  Patient/Family Comments/goals: Can I sleep in this recliner?  Pain/Comfort:  · Pain Rating 1: 0/10  · Pain Rating Post-Intervention 1: 0/10      Objective:     Communicated with nurse MD prior to session.  Patient found HOB elevated with peripheral IV, telemetry, mcdonald catheter, pressure relief boots upon PT entry to room.     General Precautions: Standard, fall   Orthopedic Precautions:N/A   Braces:    Respiratory Status: Room air     Functional Mobility:  · Bed Mobility:     · Supine to Sit: contact guard assistance  · Transfers:     · Sit to Stand:  minimum assistance with rolling walker  · Bed to Chair: minimum assistance with  rolling  walker  using  Stand Pivot      AM-PAC 6 CLICK MOBILITY  Turning over in bed (including adjusting bedclothes, sheets and blankets)?: 3  Sitting down on and standing up from a chair with arms (e.g., wheelchair, bedside commode, etc.): 3  Moving from lying on back to sitting on the side of the bed?: 3  Moving to and from a bed to a chair (including a wheelchair)?: 3  Need to walk in hospital room?: 2  Climbing 3-5 steps with a railing?: 2  Basic Mobility Total Score: 16       Therapeutic Activities and Exercises:   Seated therex: LAQ x 10  Bed mobility and transfers as noted    Patient left up in chair with all lines intact, call button in reach and nurse MD notified..    GOALS:   Multidisciplinary Problems     Physical Therapy Goals        Problem: Physical Therapy Goal    Goal Priority Disciplines Outcome Goal Variances Interventions   Physical Therapy Goal     PT, PT/OT Ongoing, Progressing     Description: Goals to be met by: 2022    Patient will increase functional independence with mobility by performin. Pt will be able to transfer supine to sit with SBA  2. Sit<>stand with CGA with RW.  3. Gait x 50 feet with CGA with RW.                        Time Tracking:     PT Received On: 22  PT Start Time: 1544     PT Stop Time: 1607  PT Total Time (min): 23 min     Billable Minutes: Therapeutic Activity 15 and Therapeutic Exercise 8    Treatment Type: Treatment  PT/PTA: PTA     PTA Visit Number: 1     2022

## 2022-03-18 NOTE — PROGRESS NOTES
[facility] Medicine  Telemedicine Progress Note    Patient Name: Pearl Villafana  MRN: [id number]  Patient Class: IP- Inpatient   Admission Date: 3/12/2022  Length of Stay: 6 days  Attending Physician: Drake Villarreal MD  Primary Care Provider: Cleopatra Whaley MD          Subjective:     Principal Problem:Sepsis        HPI:  Calista Kang is an 81-year-old female with a past medical history of alcohol abuse, polypharmacy, colon cancer, breast cancer and hyponatremia along with diabetes hyper tension hyperlipidemia B12 deficiency and gout.  Her family contacted EMS to do a welfare check and they found her down on the ground with altered mental status in urine and feces.  Length of time down on known.  Patient is arousable to tactile touch in the bed.  She is able to tell her name her location and her date of birth.  She does follow simple commands.  Patient answers no to most questions.  She does name objects correctly.  Patient is hypothermic at 95.5° and tachycardic at 104 with moderate bacteriuria in her urinalysis. Code sepsis was called patient received a bolus of normal saline vancomycin and Zosyn.  No signs of respiratory distress.  Patient has some bruising to her right right lateral abdominal area.  No abdominal distension.  No tenderness or guarding to abdominal area.  Patient denies pain.  Patient reports she does not know why mg for how long.  She says that she remembers eating breakfast yesterday and going to some doctor appointments.  There is no family at bedside and her 's phone number is disconnected.  There are no other family members phone numbers in her chart.  Unable to confirm her history or her medications at this time.  Patient admitted to hospital medicine for further workup.      Overview/Hospital Course:  See below      Interval History:    Ms. Villafana was stable overnight with no new symptoms. I updated her daughter at bedside. BP improved w/  losartan changes, now systolic mostly 140-150s.    Now accepted to [facility], pending [employer] auth    Review of Systems   Constitutional:  Positive for fatigue. Negative for chills and fever.   Respiratory:  Negative for cough and shortness of breath.    Cardiovascular:  Negative for chest pain and leg swelling.   Gastrointestinal:  Positive for diarrhea (improved). Negative for abdominal pain, constipation, nausea and vomiting.   Neurological:  Positive for weakness. Negative for numbness.   Objective:     Vital Signs (Most Recent):  Temp: 98.7 °F (37.1 °C) (03/18/22 1112)  Pulse: 64 (03/18/22 1112)  Resp: 16 (03/18/22 1112)  BP: (!) 142/66 (03/18/22 1112)  SpO2: 98 % (03/18/22 1112)   Vital Signs (24h Range):  Temp:  [97.9 °F (36.6 °C)-99.4 °F (37.4 °C)] 98.7 °F (37.1 °C)  Pulse:  [64-84] 64  Resp:  [12-18] 16  SpO2:  [96 %-98 %] 98 %  BP: (142-166)/(65-70) 142/66     Weight: 68.5 kg (151 lb)  Body mass index is 24.37 kg/m².    Intake/Output Summary (Last 24 hours) at 3/18/2022 1233  Last data filed at 3/18/2022 0717  Gross per 24 hour   Intake 2525.16 ml   Output 1900 ml   Net 625.16 ml        Physical Exam  Constitutional:       General: She is not in acute distress.  HENT:      Head: Normocephalic and atraumatic.   Eyes:      General: No scleral icterus.        Right eye: No discharge.         Left eye: No discharge.   Pulmonary:      Effort: Pulmonary effort is normal.      Comments: Breathing comfortably  Abdominal:      General: There is no distension.   Musculoskeletal:      Comments: Moves all extremities well   Skin:     Findings: No erythema or rash.   Neurological:      General: No focal deficit present.      Mental Status: She is alert and oriented to person, place, and time.      Comments: Alert, conversant   Psychiatric:         Mood and Affect: Mood normal.         Behavior: Behavior normal.       Significant Labs: All pertinent labs within the past 24 hours have been reviewed.    Significant Imaging:  I have reviewed all pertinent imaging results/findings within the past 24 hours.      Assessment/Plan:      * Sepsis  Patient presented after family contacted EMS to do a welfare check - they found her down on the ground with altered mental status in urine and feces.  Length of time down on known.  Patient was hypothermic at 95.5° and tachycardic at 104 with moderate bacteriuria in her urinalysis.  Code sepsis was called in ED and patient received a bolus of normal saline, as well as IV Vancomycin and Zosyn. Chest x-ray was negative for any consolidation or pleural effusions.  Concern for UTI due to moderate bacteriuria on analysis.  WBC was elevated at 14.77 with left shift, Procalcitonin of 0.34, normal lactic acid, normal ammonia, negative ETOH and Tox screen.  Blood cultures growing 1/4 bottles with Coag-negative Staph (likely contaminant)  and Urine culture was negative. Antibiotics were d/c on 3/14/2022.  Failed Voiding Trial yesterday and Zayas replaced overnight.  Patient is awake and oriented to person, place, month, year, but not to President.  Can do serial 7s to 14.  Awaiting SNF placement.    Plan:  Follow off antibiotics for now - discontinued pharmacy consult order  PT consulted - recommend SNF - patient agrees  Continue Zayas with repeat Voiding Trial while at SNF    Decubitus ulcer of back, stage 1  -Continue with wound care      ETOH abuse  Elevated beta hydroxybutyrate.  Ethanol less than 10. Last drink on known.  Patient with history of beer potamania.  Stable without need for benzodiazepine treatment.  -Monitor CIWA scale every 4 hours.      Acute renal failure superimposed on stage 3 chronic kidney disease    - Resolved      Altered mental status, unspecified    - Resolved      Hyponatremia    - Resolved      Colon cancer  History of colon cancer and breast cancer.  Occult stool neg.  H&H stable.  Will monitor.      Diabetes mellitus type II    - not associated with long-term insulin use  -  Complicated by hyperglycemia, last   - Home Meds:  Metformin 500 mg daily at home per chart.    A1c is 6.6  -Continue to hold Metformin at this time.    -Continue Low-dose correction sliding scale       Hyperlipidemia  On Crestor as outpatient - held this time due to elevation of liver enzymes.      Hypertension    - Improving, SBP mostly 140s-150s  - Continue losartan 50 mg daily, titrate as needed, aiming for gradual improvement  - Continue metoprolol 25 PO BID  -Monitor and adjust as needed      VTE Risk Mitigation (From admission, onward)         Ordered     Reason for No Pharmacological VTE Prophylaxis  Once        Question:  Reasons:  Answer:  Active Bleeding    03/12/22 2041     IP VTE HIGH RISK PATIENT  Once         03/12/22 2041     Place sequential compression device  Until discontinued         03/12/22 2041                      I have assessed these finding virtually using telemed platform and with assistance of bedside nurse                 The attending portion of this evaluation, treatment, and documentation was performed per Drake Villarreal MD via AudioVisual Assisted with secure video- assisted technology with [employer] software platform. Both the provider and the patient were located in the hospital and utilized 2 way audio/video to document history and physical exam.     Drake Villarreal MD  Department of Hospital Medicine   [facility] - Med Surg ([place])

## 2022-03-18 NOTE — ASSESSMENT & PLAN NOTE
- not associated with long-term insulin use  - Complicated by hyperglycemia, last   - Home Meds:  Metformin 500 mg daily at home per chart.    A1c is 6.6  -Continue to hold Metformin at this time.    -Continue Low-dose correction sliding scale

## 2022-03-18 NOTE — PLAN OF CARE
Linda TENORIO  on site from [facility] ([facility] from [facility]) has accepted and submitting to [facility] for auth today.

## 2022-03-18 NOTE — PROGRESS NOTES
[facility] Surg ([place])  Wound Care    Patient Name:  Pearl Villafana   MRN:  [id number]  Date: 3/18/2022  Diagnosis: Sepsis    History:     Past Medical History:   Diagnosis Date    B12 deficiency anemia     Calcium oxalate renal stones     Cancer     colon cancer    DCIS (ductal carcinoma in situ)     left    Diabetes mellitus     Hyperlipidemia     Hypertension        Social History     Socioeconomic History    Marital status:    Tobacco Use    Smoking status: Passive Smoke Exposure - Never Smoker   Substance and Sexual Activity    Alcohol use: Yes     Alcohol/week: 28.0 standard drinks     Types: 28 Cans of beer per week     Comment: 4 beers a day    Drug use: No    Sexual activity: Not Currently     Partners: Male       Precautions:     Allergies as of 03/12/2022    (No Known Allergies)       WO Assessment Details/Treatment     Follow up on sacral DTI  Area of concern is smaller with 7x5cm of redness and center of purple blistering tissue.   Small area of denuded skin noted.   Staff has applied mepilex border dressing to the site and barrier creams are still in use to perirectal area.   Pt had 4 documented liquid stools yesterday, but none documented today.     03/18/22 1200        Peripheral IV - Single Lumen 03/17/22 1301 22 G Left Antecubital   Placement Date/Time: 03/17/22 1301   Size/Length: 22 G  Orientation: Left  Location: Antecubital  Placement directed by: Anatomic Landmarks  Site Prep: Alcohol;Chlorhexidine   Local Anesthetic: None  Inserted by: RN  Insertion attempts (enter comment ...   Site Assessment Clean;No swelling;Dry;Intact;No redness        Altered Skin Integrity 03/14/22 1530 Sacral spine Purple or maroon localized area of discolored intact skin or non-intact skin or a blood-filled blister.   Date First Assessed/Time First Assessed: 03/14/22 1530   Altered Skin Integrity Present on Admission: suspected hospital acquired  Location: Sacral spine  Is this injury device  related?: No  Description of Altered Skin Integrity: Purple or maroon loca...   Wound Image    Description of Altered Skin Integrity Purple or maroon localized area of discolored intact skin or non-intact skin or a blood-filled blister.   Dressing Appearance Clean;Intact   Drainage Amount None   Drainage Characteristics/Odor No odor   Appearance Purple;Maroon   Tissue loss description Partial thickness   Periwound Area Intact;Redness   Wound Edges Open  (small area of denuded skin)   Wound Length (cm) 8 cm   Wound Width (cm) 5 cm   Wound Depth (cm) 0.1 cm   Wound Volume (cm^3) 4 cm^3   Wound Surface Area (cm^2) 40 cm^2     03/18/2022

## 2022-03-18 NOTE — CONSULTS
Consult received for altered skin. RD following pt, see Progress Note by María Turner, ABILIO 3/17 for complete assessment.     Recommendations  1.Recommend SLP consult- to help with correct modifications for diet.   2.Continue Boost Plus TID.   3.Encourage good PO intake.   4. Recommend Boost Pudding BID and Aman BID to promote wound healing as tolerated.

## 2022-03-19 LAB
POCT GLUCOSE: 162 MG/DL (ref 70–110)
POCT GLUCOSE: 257 MG/DL (ref 70–110)
POCT GLUCOSE: 285 MG/DL (ref 70–110)
POCT GLUCOSE: 327 MG/DL (ref 70–110)

## 2022-03-19 PROCEDURE — 99232 PR SUBSEQUENT HOSPITAL CARE,LEVL II: ICD-10-PCS | Mod: 95,,, | Performed by: INTERNAL MEDICINE

## 2022-03-19 PROCEDURE — 11000001 HC ACUTE MED/SURG PRIVATE ROOM

## 2022-03-19 PROCEDURE — 25000003 PHARM REV CODE 250: Performed by: PHYSICIAN ASSISTANT

## 2022-03-19 PROCEDURE — 97530 THERAPEUTIC ACTIVITIES: CPT | Mod: CQ

## 2022-03-19 PROCEDURE — 99232 SBSQ HOSP IP/OBS MODERATE 35: CPT | Mod: 95,,, | Performed by: INTERNAL MEDICINE

## 2022-03-19 PROCEDURE — 25000003 PHARM REV CODE 250: Performed by: INTERNAL MEDICINE

## 2022-03-19 RX ORDER — LACTULOSE 10 G/15ML
20 SOLUTION ORAL ONCE
Status: COMPLETED | OUTPATIENT
Start: 2022-03-19 | End: 2022-03-19

## 2022-03-19 RX ORDER — HYDRALAZINE HYDROCHLORIDE 25 MG/1
25 TABLET, FILM COATED ORAL ONCE
Status: COMPLETED | OUTPATIENT
Start: 2022-03-19 | End: 2022-03-19

## 2022-03-19 RX ORDER — CARVEDILOL 12.5 MG/1
12.5 TABLET ORAL 2 TIMES DAILY
Status: DISCONTINUED | OUTPATIENT
Start: 2022-03-19 | End: 2022-03-20

## 2022-03-19 RX ADMIN — INSULIN ASPART 2 UNITS: 100 INJECTION, SOLUTION INTRAVENOUS; SUBCUTANEOUS at 09:03

## 2022-03-19 RX ADMIN — INSULIN ASPART 3 UNITS: 100 INJECTION, SOLUTION INTRAVENOUS; SUBCUTANEOUS at 05:03

## 2022-03-19 RX ADMIN — CARVEDILOL 12.5 MG: 12.5 TABLET, FILM COATED ORAL at 08:03

## 2022-03-19 RX ADMIN — LIDOCAINE 5% 1 PATCH: 700 PATCH TOPICAL at 04:03

## 2022-03-19 RX ADMIN — LACTULOSE 20 G: 20 SOLUTION ORAL at 09:03

## 2022-03-19 RX ADMIN — HYDRALAZINE HYDROCHLORIDE 25 MG: 25 TABLET, FILM COATED ORAL at 12:03

## 2022-03-19 RX ADMIN — LOPERAMIDE HYDROCHLORIDE 2 MG: 2 CAPSULE ORAL at 02:03

## 2022-03-19 RX ADMIN — CARVEDILOL 12.5 MG: 12.5 TABLET, FILM COATED ORAL at 09:03

## 2022-03-19 RX ADMIN — INSULIN ASPART 3 UNITS: 100 INJECTION, SOLUTION INTRAVENOUS; SUBCUTANEOUS at 01:03

## 2022-03-19 RX ADMIN — LOSARTAN POTASSIUM 50 MG: 50 TABLET, FILM COATED ORAL at 08:03

## 2022-03-19 NOTE — PLAN OF CARE
POC reviewed w/ pt and purposeful rounding complete. Meds administered per MAR. Pt denies pain and SOB. Blood glucose monitored and PRN insulin administered per order. Zayas in place draining clear yellow urine and CAUTI complete. LPEPa4x complete. Safety precautions intact; no injuries or falls this shift. Call bell w/ in reach. Pt denies needs at this time; will continue to monitor.

## 2022-03-19 NOTE — PLAN OF CARE
Pt is A&O*4. Ambulatory with assist*2. BP meds changed to Coreg. Diet changed to Diabetic Diet. Complained of constipation to the MD. Ordered One dose of lactulose. Given. Had 3 loose BM. Given loperamide to stop diarrhea. Pending Rehab placement. Will cont to monitor.

## 2022-03-19 NOTE — PROGRESS NOTES
[facility] Medicine  Telemedicine Progress Note    Patient Name: Pearl Villafana  MRN: [id number]  Patient Class: IP- Inpatient   Admission Date: 3/12/2022  Length of Stay: 7 days  Attending Physician: Drake Villarreal MD  Primary Care Provider: Cleopatra Whaley MD          Subjective:     Principal Problem:Sepsis        HPI:  Calista Kang is an 81-year-old female with a past medical history of alcohol abuse, polypharmacy, colon cancer, breast cancer and hyponatremia along with diabetes hyper tension hyperlipidemia B12 deficiency and gout.  Her family contacted EMS to do a welfare check and they found her down on the ground with altered mental status in urine and feces.  Length of time down on known.  Patient is arousable to tactile touch in the bed.  She is able to tell her name her location and her date of birth.  She does follow simple commands.  Patient answers no to most questions.  She does name objects correctly.  Patient is hypothermic at 95.5° and tachycardic at 104 with moderate bacteriuria in her urinalysis. Code sepsis was called patient received a bolus of normal saline vancomycin and Zosyn.  No signs of respiratory distress.  Patient has some bruising to her right right lateral abdominal area.  No abdominal distension.  No tenderness or guarding to abdominal area.  Patient denies pain.  Patient reports she does not know why mg for how long.  She says that she remembers eating breakfast yesterday and going to some doctor appointments.  There is no family at bedside and her 's phone number is disconnected.  There are no other family members phone numbers in her chart.  Unable to confirm her history or her medications at this time.  Patient admitted to hospital medicine for further workup.      Overview/Hospital Course:  See below      Interval History:    Ms. Villafana was stable overnight with no new symptoms or complaints. She is awaiting placement    BP elevated to  198/86, swapped metoprolol -> coreg. This is asymptomatic with no chest pain or dyspnea    Review of Systems   Constitutional:  Positive for fatigue. Negative for chills and fever.   Respiratory:  Negative for cough and shortness of breath.    Cardiovascular:  Negative for chest pain and leg swelling.   Gastrointestinal:  Positive for diarrhea (improved). Negative for abdominal pain, constipation, nausea and vomiting.   Neurological:  Positive for weakness. Negative for numbness.   Objective:     Vital Signs (Most Recent):  Temp: 98.2 °F (36.8 °C) (03/19/22 0716)  Pulse: 67 (03/19/22 0716)  Resp: 12 (03/19/22 0716)  BP: (!) 198/86 (03/19/22 0829)  SpO2: 95 % (03/19/22 0716)   Vital Signs (24h Range):  Temp:  [97.5 °F (36.4 °C)-98.7 °F (37.1 °C)] 98.2 °F (36.8 °C)  Pulse:  [63-72] 67  Resp:  [12-18] 12  SpO2:  [95 %-98 %] 95 %  BP: (129-198)/(61-86) 198/86     Weight: 68.5 kg (151 lb)  Body mass index is 24.37 kg/m².    Intake/Output Summary (Last 24 hours) at 3/19/2022 0858  Last data filed at 3/19/2022 0654  Gross per 24 hour   Intake 1080 ml   Output 850 ml   Net 230 ml        Physical Exam  Constitutional:       General: She is not in acute distress.  HENT:      Head: Normocephalic and atraumatic.   Eyes:      General: No scleral icterus.        Right eye: No discharge.         Left eye: No discharge.   Pulmonary:      Effort: Pulmonary effort is normal.      Comments: Breathing comfortably  Abdominal:      General: There is no distension.   Musculoskeletal:      Comments: Moves all extremities well   Skin:     Findings: No erythema or rash.   Neurological:      General: No focal deficit present.      Mental Status: She is alert and oriented to person, place, and time.      Comments: Alert, conversant   Psychiatric:         Mood and Affect: Mood normal.         Behavior: Behavior normal.       Significant Labs: All pertinent labs within the past 24 hours have been reviewed.    Significant Imaging: I have reviewed  all pertinent imaging results/findings within the past 24 hours.      Assessment/Plan:      * Sepsis  Patient presented after family contacted EMS to do a welfare check - they found her down on the ground with altered mental status in urine and feces.  Length of time down on known.  Patient was hypothermic at 95.5° and tachycardic at 104 with moderate bacteriuria in her urinalysis.  Code sepsis was called in ED and patient received a bolus of normal saline, as well as IV Vancomycin and Zosyn. Chest x-ray was negative for any consolidation or pleural effusions.  Concern for UTI due to moderate bacteriuria on analysis.  WBC was elevated at 14.77 with left shift, Procalcitonin of 0.34, normal lactic acid, normal ammonia, negative ETOH and Tox screen.  Blood cultures growing 1/4 bottles with Coag-negative Staph (likely contaminant)  and Urine culture was negative. Antibiotics were d/c on 3/14/2022.  Failed Voiding Trial yesterday and Zayas replaced overnight.  Patient is awake and oriented to person, place, month, year, but not to President.  Can do serial 7s to 14.  Awaiting SNF placement.    Plan:  Follow off antibiotics for now - discontinued pharmacy consult order  PT consulted - recommend SNF - patient agrees  Continue Zayas with repeat Voiding Trial while at SNF    Decubitus ulcer of back, stage 1  -Continue with wound care      ETOH abuse  Elevated beta hydroxybutyrate.  Ethanol less than 10. Last drink on known.  Patient with history of beer potamania.  Stable without need for benzodiazepine treatment.  -Monitor CIWA scale every 4 hours.      Acute renal failure superimposed on stage 3 chronic kidney disease    - Resolved      Altered mental status, unspecified    - Resolved      Hyponatremia    - Resolved      Colon cancer  History of colon cancer and breast cancer.  Occult stool neg.  H&H stable.  Will monitor.      Diabetes mellitus type II    - BG trending up, likely more intake as she improves. Swapped to  diabetic diet with same consistency restrictions  - not associated with long-term insulin use  - Complicated by hyperglycemia, last   - Home Meds:  Metformin 500 mg daily at home per chart.    A1c is 6.6  -Continue to hold Metformin at this time.    -Continue Low-dose correction sliding scale       Hyperlipidemia  On Crestor as outpatient - held this time due to elevation of liver enzymes.      Hypertension    - Worse today, AM BP 190s systolic  - Continue losartan 50 mg daily, titrate as needed, aiming for gradual improvement  - Swap metoprolol 25 PO BID -> coreg 12.5 mg PO BID first dose this AM  -Monitor and adjust as needed    VTE Risk Mitigation (From admission, onward)         Ordered     Reason for No Pharmacological VTE Prophylaxis  Once        Question:  Reasons:  Answer:  Active Bleeding    03/12/22 2041     IP VTE HIGH RISK PATIENT  Once         03/12/22 2041     Place sequential compression device  Until discontinued         03/12/22 2041                      I have assessed these finding virtually using telemed platform and with assistance of bedside nurse                 The attending portion of this evaluation, treatment, and documentation was performed per Drake Villarreal MD via AudioVisual Assisted with secure video- assisted technology with [employer] software platform. Both the provider and the patient were located in the hospital and utilized 2 way audio/video to document history and physical exam.     Drake Villarreal MD  Department of Hospital Medicine   [facility] - Med Surg ([place])

## 2022-03-19 NOTE — ASSESSMENT & PLAN NOTE
- BG trending up, likely more intake as she improves. Swapped to diabetic diet with same consistency restrictions  - not associated with long-term insulin use  - Complicated by hyperglycemia, last   - Home Meds:  Metformin 500 mg daily at home per chart.    A1c is 6.6  -Continue to hold Metformin at this time.    -Continue Low-dose correction sliding scale

## 2022-03-19 NOTE — PLAN OF CARE
03/19/22 1607   Post-Acute Status   Post-Acute Authorization Placement   Post-Acute Placement Status Set-up Complete/Auth obtained   Discharge Delays None known at this time   Discharge Plan   Discharge Plan A Skilled Nursing Facility       SNEHA spoke to the patient's dtr, Magdalena Ortez, who requested to see SNEHA.  She asked the name of the facility where her mother had been accepted. SNEHA   Confirmed that it was [facility]. Ms. Ortez  was aware that she needed  To sign paperwork for the admission. SNEHA said that she would speak with Rajwinder,  the patient's CM on Monday and have her call Ms. Ortez.

## 2022-03-19 NOTE — ASSESSMENT & PLAN NOTE
- Worse today, AM BP 190s systolic  - Continue losartan 50 mg daily, titrate as needed, aiming for gradual improvement  - Swap metoprolol 25 PO BID -> coreg 12.5 mg PO BID first dose this AM  -Monitor and adjust as needed

## 2022-03-19 NOTE — PT/OT/SLP PROGRESS
Physical Therapy Treatment    Patient Name:  Pearl Villafana   MRN:  [id number]    Recommendations:     Discharge Recommendations:  nursing facility, skilled   Discharge Equipment Recommendations: walker, rolling   Barriers to discharge: None to SNF    Assessment:     Pearl Villafana is a 81 y.o. female admitted with a medical diagnosis of Sepsis.  She presents with the following impairments/functional limitations:  weakness, pain, edema, impaired balance, impaired cardiopulmonary response to activity, impaired endurance, impaired functional mobilty, impaired self care skills.    Supine>sit with Nasreen  Sit>stand with RW and Nasreen (x 3 trials)  Bed>chair with RW and CGA, step transfer  Static standing with RW and SBA x 3 trials:  2 mins, 2 mins, 1 min  Pt with fair mobility today with transfer, limited endurance for standing, cont to progress toward goals  Rec SNF    Rehab Prognosis: Good; patient would benefit from acute skilled PT services to address these deficits and reach maximum level of function.    Recent Surgery: * No surgery found *      Plan:     During this hospitalization, patient to be seen 5 x/week to address the identified rehab impairments via gait training, therapeutic activities, therapeutic exercises, neuromuscular re-education and progress toward the following goals:    · Plan of Care Expires:  04/13/22    Subjective     Chief Complaint: Can I sit down now?   Patient/Family Comments/goals: pt agreeable to therapy  Pain/Comfort:  · Pain Rating 1: 0/10  · Pain Rating Post-Intervention 1: 0/10      Objective:     Communicated with nurse MD prior to session.  Patient found HOB elevated with peripheral IV, telemetry, mcdonald catheter upon PT entry to room.     General Precautions: Standard, fall   Orthopedic Precautions:N/A   Braces:    Respiratory Status: Room air     Functional Mobility:  · Bed Mobility:     · Supine to Sit: minimum assistance  · Transfers:     · Sit to Stand:  minimum assistance with  rolling walker  · Bed to Chair: contact guard assistance with  rolling walker  using  Step Transfer    Pt found to be soiled with stool, natividad hygiene performed with pt in standing, gown changed, soiled sacral dressing removed, RN notified    AM-PAC 6 CLICK MOBILITY  Turning over in bed (including adjusting bedclothes, sheets and blankets)?: 3  Sitting down on and standing up from a chair with arms (e.g., wheelchair, bedside commode, etc.): 3  Moving from lying on back to sitting on the side of the bed?: 3  Moving to and from a bed to a chair (including a wheelchair)?: 3  Need to walk in hospital room?: 2  Climbing 3-5 steps with a railing?: 2  Basic Mobility Total Score: 16       Therapeutic Activities and Exercises:   Static standing with RW and SBA x 3 trials: 2 mins, 2 mins, 1 min for natividad hygiene    Patient left up in chair with all lines intact, call button in reach and nurse MD notified..    GOALS:   Multidisciplinary Problems     Physical Therapy Goals        Problem: Physical Therapy Goal    Goal Priority Disciplines Outcome Goal Variances Interventions   Physical Therapy Goal     PT, PT/OT Ongoing, Progressing     Description: Goals to be met by: 2022    Patient will increase functional independence with mobility by performin. Pt will be able to transfer supine to sit with SBA  2. Sit<>stand with CGA with RW.  3. Gait x 50 feet with CGA with RW.                        Time Tracking:     PT Received On: 22  PT Start Time: 1103     PT Stop Time: 1133  PT Total Time (min): 30 min     Billable Minutes: Therapeutic Activity 30    Treatment Type: Treatment  PT/PTA: PTA     PTA Visit Number: 2     2022

## 2022-03-20 LAB
POCT GLUCOSE: 187 MG/DL (ref 70–110)
POCT GLUCOSE: 292 MG/DL (ref 70–110)
POCT GLUCOSE: 322 MG/DL (ref 70–110)

## 2022-03-20 PROCEDURE — 99232 SBSQ HOSP IP/OBS MODERATE 35: CPT | Mod: 95,,, | Performed by: INTERNAL MEDICINE

## 2022-03-20 PROCEDURE — 11000001 HC ACUTE MED/SURG PRIVATE ROOM

## 2022-03-20 PROCEDURE — 99232 PR SUBSEQUENT HOSPITAL CARE,LEVL II: ICD-10-PCS | Mod: 95,,, | Performed by: INTERNAL MEDICINE

## 2022-03-20 PROCEDURE — C9399 UNCLASSIFIED DRUGS OR BIOLOG: HCPCS | Performed by: INTERNAL MEDICINE

## 2022-03-20 PROCEDURE — 25000003 PHARM REV CODE 250: Performed by: PHYSICIAN ASSISTANT

## 2022-03-20 PROCEDURE — 25000003 PHARM REV CODE 250: Performed by: INTERNAL MEDICINE

## 2022-03-20 RX ORDER — CARVEDILOL 12.5 MG/1
25 TABLET ORAL 2 TIMES DAILY
Status: DISCONTINUED | OUTPATIENT
Start: 2022-03-20 | End: 2022-03-28 | Stop reason: HOSPADM

## 2022-03-20 RX ORDER — LOSARTAN POTASSIUM 50 MG/1
50 TABLET ORAL DAILY
Status: DISCONTINUED | OUTPATIENT
Start: 2022-03-20 | End: 2022-03-21

## 2022-03-20 RX ORDER — HYDRALAZINE HYDROCHLORIDE 25 MG/1
25 TABLET, FILM COATED ORAL EVERY 6 HOURS PRN
Status: DISCONTINUED | OUTPATIENT
Start: 2022-03-20 | End: 2022-03-28 | Stop reason: HOSPADM

## 2022-03-20 RX ADMIN — LIDOCAINE 5% 1 PATCH: 700 PATCH TOPICAL at 03:03

## 2022-03-20 RX ADMIN — CARVEDILOL 25 MG: 12.5 TABLET, FILM COATED ORAL at 08:03

## 2022-03-20 RX ADMIN — INSULIN DETEMIR 5 UNITS: 100 INJECTION, SOLUTION SUBCUTANEOUS at 08:03

## 2022-03-20 RX ADMIN — INSULIN ASPART 3 UNITS: 100 INJECTION, SOLUTION INTRAVENOUS; SUBCUTANEOUS at 01:03

## 2022-03-20 RX ADMIN — LOSARTAN POTASSIUM 50 MG: 50 TABLET, FILM COATED ORAL at 05:03

## 2022-03-20 RX ADMIN — MELATONIN TAB 3 MG 6 MG: 3 TAB at 08:03

## 2022-03-20 RX ADMIN — INSULIN ASPART 1 UNITS: 100 INJECTION, SOLUTION INTRAVENOUS; SUBCUTANEOUS at 08:03

## 2022-03-20 NOTE — PLAN OF CARE
POC reviewed w/ pt and purposeful rounding complete. Meds administered per MAR. Morning BP elevated and morning losartan rescheduled to 0600. Blood glucose monitored and PRN insulin administered per order.  VSS on RA. Pt AAOx4 and 2 assist. Pt denies pain and SOB. Zayas intact and CAUTI complete. Safety precautions intact; no injuries or falls this shift. Pt denies needs at this time; will continue to monitor.

## 2022-03-20 NOTE — PROGRESS NOTES
[facility] Medicine  Telemedicine Progress Note    Patient Name: Pearl Villafana  MRN: [id number]  Patient Class: IP- Inpatient   Admission Date: 3/12/2022  Length of Stay: 8 days  Attending Physician: Drake Villarreal MD  Primary Care Provider: Cleopatra Whaley MD          Subjective:     Principal Problem:Sepsis        HPI:  Calista Kang is an 81-year-old female with a past medical history of alcohol abuse, polypharmacy, colon cancer, breast cancer and hyponatremia along with diabetes hyper tension hyperlipidemia B12 deficiency and gout.  Her family contacted EMS to do a welfare check and they found her down on the ground with altered mental status in urine and feces.  Length of time down on known.  Patient is arousable to tactile touch in the bed.  She is able to tell her name her location and her date of birth.  She does follow simple commands.  Patient answers no to most questions.  She does name objects correctly.  Patient is hypothermic at 95.5° and tachycardic at 104 with moderate bacteriuria in her urinalysis. Code sepsis was called patient received a bolus of normal saline vancomycin and Zosyn.  No signs of respiratory distress.  Patient has some bruising to her right right lateral abdominal area.  No abdominal distension.  No tenderness or guarding to abdominal area.  Patient denies pain.  Patient reports she does not know why mg for how long.  She says that she remembers eating breakfast yesterday and going to some doctor appointments.  There is no family at bedside and her 's phone number is disconnected.  There are no other family members phone numbers in her chart.  Unable to confirm her history or her medications at this time.  Patient admitted to hospital medicine for further workup.      Overview/Hospital Course:  See below      Interval History:    Ms. Villafana was stable overnight with no new symptoms or complaints. She is awaiting placement    BP elevated to  190s intermittently. Titrated up coreg, added hydralazine PRN as backup    Review of Systems   Constitutional:  Positive for fatigue. Negative for chills and fever.   Respiratory:  Negative for cough and shortness of breath.    Cardiovascular:  Negative for chest pain and leg swelling.   Gastrointestinal:  Positive for diarrhea (improved). Negative for abdominal pain, constipation, nausea and vomiting.   Neurological:  Positive for weakness. Negative for numbness.   Objective:     Vital Signs (Most Recent):  Temp: 98 °F (36.7 °C) (03/20/22 0705)  Pulse: 67 (03/20/22 0710)  Resp: 12 (03/20/22 0705)  BP: (!) 160/70 (03/20/22 0710)  SpO2: 97 % (03/20/22 0705)   Vital Signs (24h Range):  Temp:  [97.7 °F (36.5 °C)-98.7 °F (37.1 °C)] 98 °F (36.7 °C)  Pulse:  [67-82] 67  Resp:  [12-18] 12  SpO2:  [96 %-99 %] 97 %  BP: (129-193)/(63-86) 160/70     Weight: 68.5 kg (151 lb)  Body mass index is 24.37 kg/m².    Intake/Output Summary (Last 24 hours) at 3/20/2022 0926  Last data filed at 3/20/2022 0717  Gross per 24 hour   Intake 720 ml   Output 750 ml   Net -30 ml        Physical Exam  Constitutional:       General: She is not in acute distress.  HENT:      Head: Normocephalic and atraumatic.   Eyes:      General: No scleral icterus.        Right eye: No discharge.         Left eye: No discharge.   Pulmonary:      Effort: Pulmonary effort is normal.      Comments: Breathing comfortably  Abdominal:      General: There is no distension.   Musculoskeletal:      Comments: Moves all extremities well   Skin:     Findings: No erythema or rash.   Neurological:      General: No focal deficit present.      Mental Status: She is alert and oriented to person, place, and time.      Comments: Alert, conversant   Psychiatric:         Mood and Affect: Mood normal.         Behavior: Behavior normal.       Significant Labs: All pertinent labs within the past 24 hours have been reviewed.    Significant Imaging: I have reviewed all pertinent  imaging results/findings within the past 24 hours.      Assessment/Plan:      * Sepsis  Patient presented after family contacted EMS to do a welfare check - they found her down on the ground with altered mental status in urine and feces.  Length of time down on known.  Patient was hypothermic at 95.5° and tachycardic at 104 with moderate bacteriuria in her urinalysis.  Code sepsis was called in ED and patient received a bolus of normal saline, as well as IV Vancomycin and Zosyn. Chest x-ray was negative for any consolidation or pleural effusions.  Concern for UTI due to moderate bacteriuria on analysis.  WBC was elevated at 14.77 with left shift, Procalcitonin of 0.34, normal lactic acid, normal ammonia, negative ETOH and Tox screen.  Blood cultures growing 1/4 bottles with Coag-negative Staph (likely contaminant)  and Urine culture was negative. Antibiotics were d/c on 3/14/2022.  Failed Voiding Trial yesterday and Zayas replaced overnight.  Patient is awake and oriented to person, place, month, year, but not to President.  Can do serial 7s to 14.  Awaiting SNF placement.    Plan:  Follow off antibiotics for now - discontinued pharmacy consult order  PT consulted - recommend SNF - patient agrees  Continue Zayas with repeat Voiding Trial while at SNF    Hypertension    - Some improvement but still peak SBP 190s   - Continue losartan 50 mg daily, titrate as needed, aiming for gradual improvement  - Increased coreg to 25 mg PO BID  - Added hydralazine 25 mg PO Q6H PRN SBP > 170 first dose 1100 to allow some time for the coreg to work  - Monitor and adjust as needed    Diabetes mellitus type II    - Not associated with long-term insulin use  - Complicated by hyperglycemia, BG still above goal on DM diet. Improves w/ SSI  - Home Meds:  Metformin 500 mg daily at home per chart.    A1c is 6.6  -Continue to hold Metformin at this time.    -Continue Low-dose correction sliding scale   - Added detemir 5 units  QHS      Decubitus ulcer of back, stage 1  -Continue with wound care      ETOH abuse  Elevated beta hydroxybutyrate.  Ethanol less than 10. Last drink on known.  Patient with history of beer potamania.  Stable without need for benzodiazepine treatment.  -Monitor CIWA scale every 4 hours.      Acute renal failure superimposed on stage 3 chronic kidney disease    - Resolved      Altered mental status, unspecified    - Resolved      Hyponatremia    - Resolved      Colon cancer  History of colon cancer and breast cancer.  Occult stool neg.  H&H stable.  Will monitor.      Hyperlipidemia  On Crestor as outpatient - held this time due to elevation of liver enzymes.        VTE Risk Mitigation (From admission, onward)         Ordered     Reason for No Pharmacological VTE Prophylaxis  Once        Question:  Reasons:  Answer:  Active Bleeding    03/12/22 2041     IP VTE HIGH RISK PATIENT  Once         03/12/22 2041     Place sequential compression device  Until discontinued         03/12/22 2041                      I have assessed these finding virtually using telemed platform and with assistance of bedside nurse                 The attending portion of this evaluation, treatment, and documentation was performed per Drake Villarreal MD via AudioVisual Assisted with secure video- assisted technology with BeGo software platform. Both the provider and the patient were located in the hospital and utilized 2 way audio/video to document history and physical exam.     Drake Villarreal MD  Department of Hospital Medicine   [facility] ([place])

## 2022-03-20 NOTE — ASSESSMENT & PLAN NOTE
- Some improvement but still peak SBP 190s   - Continue losartan 50 mg daily, titrate as needed, aiming for gradual improvement  - Increased coreg to 25 mg PO BID  - Added hydralazine 25 mg PO Q6H PRN SBP > 170 first dose 1100 to allow some time for the coreg to work  - Monitor and adjust as needed

## 2022-03-20 NOTE — SUBJECTIVE & OBJECTIVE
Interval History:    Ms. Villafana was stable overnight with no new symptoms or complaints. She is awaiting placement    BP elevated to 190s intermittently. Titrated up coreg, added hydralazine PRN as backup    Review of Systems   Constitutional:  Positive for fatigue. Negative for chills and fever.   Respiratory:  Negative for cough and shortness of breath.    Cardiovascular:  Negative for chest pain and leg swelling.   Gastrointestinal:  Positive for diarrhea (improved). Negative for abdominal pain, constipation, nausea and vomiting.   Neurological:  Positive for weakness. Negative for numbness.   Objective:     Vital Signs (Most Recent):  Temp: 98 °F (36.7 °C) (03/20/22 0705)  Pulse: 67 (03/20/22 0710)  Resp: 12 (03/20/22 0705)  BP: (!) 160/70 (03/20/22 0710)  SpO2: 97 % (03/20/22 0705)   Vital Signs (24h Range):  Temp:  [97.7 °F (36.5 °C)-98.7 °F (37.1 °C)] 98 °F (36.7 °C)  Pulse:  [67-82] 67  Resp:  [12-18] 12  SpO2:  [96 %-99 %] 97 %  BP: (129-193)/(63-86) 160/70     Weight: 68.5 kg (151 lb)  Body mass index is 24.37 kg/m².    Intake/Output Summary (Last 24 hours) at 3/20/2022 0926  Last data filed at 3/20/2022 0717  Gross per 24 hour   Intake 720 ml   Output 750 ml   Net -30 ml        Physical Exam  Constitutional:       General: She is not in acute distress.  HENT:      Head: Normocephalic and atraumatic.   Eyes:      General: No scleral icterus.        Right eye: No discharge.         Left eye: No discharge.   Pulmonary:      Effort: Pulmonary effort is normal.      Comments: Breathing comfortably  Abdominal:      General: There is no distension.   Musculoskeletal:      Comments: Moves all extremities well   Skin:     Findings: No erythema or rash.   Neurological:      General: No focal deficit present.      Mental Status: She is alert and oriented to person, place, and time.      Comments: Alert, conversant   Psychiatric:         Mood and Affect: Mood normal.         Behavior: Behavior normal.        Significant Labs: All pertinent labs within the past 24 hours have been reviewed.    Significant Imaging: I have reviewed all pertinent imaging results/findings within the past 24 hours.

## 2022-03-20 NOTE — ASSESSMENT & PLAN NOTE
- Not associated with long-term insulin use  - Complicated by hyperglycemia, BG still above goal on DM diet. Improves w/ SSI  - Home Meds:  Metformin 500 mg daily at home per chart.    A1c is 6.6  -Continue to hold Metformin at this time.    -Continue Low-dose correction sliding scale   - Added detemir 5 units QHS

## 2022-03-21 LAB
POCT GLUCOSE: 149 MG/DL (ref 70–110)
POCT GLUCOSE: 262 MG/DL (ref 70–110)
POCT GLUCOSE: 292 MG/DL (ref 70–110)
POCT GLUCOSE: 294 MG/DL (ref 70–110)

## 2022-03-21 PROCEDURE — 25000003 PHARM REV CODE 250: Performed by: PHYSICIAN ASSISTANT

## 2022-03-21 PROCEDURE — 99232 PR SUBSEQUENT HOSPITAL CARE,LEVL II: ICD-10-PCS | Mod: 95,,, | Performed by: INTERNAL MEDICINE

## 2022-03-21 PROCEDURE — 25000003 PHARM REV CODE 250: Performed by: INTERNAL MEDICINE

## 2022-03-21 PROCEDURE — 11000001 HC ACUTE MED/SURG PRIVATE ROOM

## 2022-03-21 PROCEDURE — 97530 THERAPEUTIC ACTIVITIES: CPT | Mod: CQ

## 2022-03-21 PROCEDURE — 99232 SBSQ HOSP IP/OBS MODERATE 35: CPT | Mod: 95,,, | Performed by: INTERNAL MEDICINE

## 2022-03-21 RX ORDER — LOSARTAN POTASSIUM 50 MG/1
100 TABLET ORAL DAILY
Status: DISCONTINUED | OUTPATIENT
Start: 2022-03-22 | End: 2022-03-21

## 2022-03-21 RX ORDER — LOSARTAN POTASSIUM 50 MG/1
50 TABLET ORAL 2 TIMES DAILY
Status: DISCONTINUED | OUTPATIENT
Start: 2022-03-21 | End: 2022-03-28 | Stop reason: HOSPADM

## 2022-03-21 RX ORDER — LOSARTAN POTASSIUM 50 MG/1
50 TABLET ORAL ONCE
Status: DISCONTINUED | OUTPATIENT
Start: 2022-03-21 | End: 2022-03-21

## 2022-03-21 RX ADMIN — LOSARTAN POTASSIUM 50 MG: 50 TABLET, FILM COATED ORAL at 08:03

## 2022-03-21 RX ADMIN — CARVEDILOL 25 MG: 12.5 TABLET, FILM COATED ORAL at 09:03

## 2022-03-21 RX ADMIN — INSULIN ASPART 3 UNITS: 100 INJECTION, SOLUTION INTRAVENOUS; SUBCUTANEOUS at 06:03

## 2022-03-21 RX ADMIN — INSULIN DETEMIR 5 UNITS: 100 INJECTION, SOLUTION SUBCUTANEOUS at 09:03

## 2022-03-21 RX ADMIN — INSULIN ASPART 3 UNITS: 100 INJECTION, SOLUTION INTRAVENOUS; SUBCUTANEOUS at 12:03

## 2022-03-21 RX ADMIN — LOSARTAN POTASSIUM 50 MG: 50 TABLET, FILM COATED ORAL at 09:03

## 2022-03-21 RX ADMIN — LIDOCAINE 5% 1 PATCH: 700 PATCH TOPICAL at 03:03

## 2022-03-21 RX ADMIN — CARVEDILOL 25 MG: 12.5 TABLET, FILM COATED ORAL at 08:03

## 2022-03-21 RX ADMIN — INSULIN ASPART 3 UNITS: 100 INJECTION, SOLUTION INTRAVENOUS; SUBCUTANEOUS at 09:03

## 2022-03-21 NOTE — PLAN OF CARE
Problem: Physical Therapy Goal  Goal: Physical Therapy Goal  Description: Goals to be met by: 2022    Patient will increase functional independence with mobility by performin. Pt will be able to transfer supine to sit with SBA MET 3/21/2022  2. Sit<>stand with CGA with RW. MET 3/21/2022  3. Gait x 50 feet with CGA with RW.    Supine>sit with SBA  Sit>stand CGA with RW  Bed>BSC with RW and CGA using stand/pivot  Static standing with RW and SBA x 2 mins for natividad hygiene  BSC>chair with RW and CGA using step transfer  Pt performed 2 transfers with CGA and met two goals this visit  Rec SNF     Outcome: Ongoing, Progressing

## 2022-03-21 NOTE — PT/OT/SLP PROGRESS
Physical Therapy Treatment    Patient Name:  Pearl Villafana   MRN:  [id number]    Recommendations:     Discharge Recommendations:  nursing facility, skilled   Discharge Equipment Recommendations: walker, rolling   Barriers to discharge: None to SNF    Assessment:     Pearl Villafaan is a 81 y.o. female admitted with a medical diagnosis of Sepsis.  She presents with the following impairments/functional limitations:  weakness, pain, edema, impaired balance, impaired cardiopulmonary response to activity, impaired endurance, impaired functional mobilty, impaired self care skills.    Supine>sit with SBA  Sit>stand CGA with RW  Bed>BSC with RW and CGA using stand/pivot  Static standing with RW and SBA x 2 mins for natividad hygiene  BSC>chair with RW and CGA using step transfer  Pt performed 2 transfers with CGA and met two goals this visit  Rec SNF    Rehab Prognosis: Good; patient would benefit from acute skilled PT services to address these deficits and reach maximum level of function.    Recent Surgery: * No surgery found *      Plan:     During this hospitalization, patient to be seen 5 x/week to address the identified rehab impairments via gait training, therapeutic activities, therapeutic exercises, neuromuscular re-education and progress toward the following goals:    · Plan of Care Expires:  04/13/22    Subjective     Chief Complaint: I got to use the toilet  Patient/Family Comments/goals: I can't walk that far!  Pain/Comfort:  · Pain Rating 1: 0/10  · Pain Rating Post-Intervention 1: 0/10      Objective:     Communicated with nurse MD prior to session.  Patient found HOB elevated with peripheral IV, telemetry, mcdonald catheter upon PT entry to room.     General Precautions: Standard, fall   Orthopedic Precautions:N/A   Braces:    Respiratory Status: Room air     Functional Mobility:  · Bed Mobility:     · Supine to Sit: stand by assistance  · Transfers:     · Sit to Stand:  contact guard assistance with rolling  walker  · BSC to Chair: contact guard assistance with  rolling walker  using  Step Transfer  · Toilet Transfer: contact guard assistance with  rolling walker  using  Stand Pivot      AM-PAC 6 CLICK MOBILITY  Turning over in bed (including adjusting bedclothes, sheets and blankets)?: 3  Sitting down on and standing up from a chair with arms (e.g., wheelchair, bedside commode, etc.): 3  Moving from lying on back to sitting on the side of the bed?: 3  Moving to and from a bed to a chair (including a wheelchair)?: 3  Need to walk in hospital room?: 2  Climbing 3-5 steps with a railing?: 2  Basic Mobility Total Score: 16       Therapeutic Activities and Exercises:   Static standing with RW and SBA x 2 mins for natividad hygiene  Transfer training as noted    Patient left up in chair with all lines intact, call button in reach and nurse MD notified..    GOALS:   Multidisciplinary Problems     Physical Therapy Goals        Problem: Physical Therapy Goal    Goal Priority Disciplines Outcome Goal Variances Interventions   Physical Therapy Goal     PT, PT/OT Ongoing, Progressing     Description: Goals to be met by: 2022    Patient will increase functional independence with mobility by performin. Pt will be able to transfer supine to sit with SBA MET 3/21/2022  2. Sit<>stand with CGA with RW. MET 3/21/2022  3. Gait x 50 feet with CGA with RW.                        Time Tracking:     PT Received On: 22  PT Start Time: 1438     PT Stop Time: 1507  PT Total Time (min): 29 min     Billable Minutes: Therapeutic Activity 29    Treatment Type: Treatment  PT/PTA: PTA     PTA Visit Number: 3     2022

## 2022-03-21 NOTE — PLAN OF CARE
"LUISA spoke to pt's daughter, Magdalena, [phone]. Magdalena expects her mother is going to [place].    LUISA has made multiple calls to Denise at [place] to inquire. Denise states she will "call me back". Then additional calls made.    Hopefully arrangements can be completed in order for pt to discharge tomorrow, 3/22/22.   03/21/22 0160   Discharge Reassessment   Assessment Type Discharge Planning Reassessment   Did the patient's condition or plan change since previous assessment? No   Discharge Plan discussed with: Patient;Adult children   Communicated SHANIKA with patient/caregiver Yes     "

## 2022-03-21 NOTE — SUBJECTIVE & OBJECTIVE
Interval History:    Ms. Villafana was stable overnight with no new symptoms or complaints. She is awaiting placement    BP improved, still higher in AM, increased losartan to BID for more even coverage    BG improved on detemir QHS    Review of Systems   Constitutional:  Positive for fatigue. Negative for chills and fever.   Respiratory:  Negative for cough and shortness of breath.    Cardiovascular:  Negative for chest pain and leg swelling.   Gastrointestinal:  Positive for diarrhea (improved). Negative for abdominal pain, constipation, nausea and vomiting.   Neurological:  Positive for weakness. Negative for numbness.   Objective:     Vital Signs (Most Recent):  Temp: 98.8 °F (37.1 °C) (03/21/22 0703)  Pulse: 70 (03/21/22 0703)  Resp: 12 (03/21/22 0703)  BP: (!) 170/74 (03/21/22 0840)  SpO2: 95 % (03/21/22 0703)   Vital Signs (24h Range):  Temp:  [97.9 °F (36.6 °C)-99.6 °F (37.6 °C)] 98.8 °F (37.1 °C)  Pulse:  [69-76] 70  Resp:  [12-18] 12  SpO2:  [95 %-98 %] 95 %  BP: (135-185)/(60-79) 170/74     Weight: 68.5 kg (151 lb)  Body mass index is 24.37 kg/m².    Intake/Output Summary (Last 24 hours) at 3/21/2022 0908  Last data filed at 3/21/2022 0745  Gross per 24 hour   Intake 1530 ml   Output 875 ml   Net 655 ml        Physical Exam  Constitutional:       General: She is not in acute distress.  HENT:      Head: Normocephalic and atraumatic.   Eyes:      General: No scleral icterus.        Right eye: No discharge.         Left eye: No discharge.   Pulmonary:      Effort: Pulmonary effort is normal.      Comments: Breathing comfortably  Abdominal:      General: There is no distension.   Musculoskeletal:      Comments: Moves all extremities well   Skin:     Findings: No erythema or rash.   Neurological:      General: No focal deficit present.      Mental Status: She is alert and oriented to person, place, and time.      Comments: Alert, conversant   Psychiatric:         Mood and Affect: Mood normal.         Behavior:  Behavior normal.       Significant Labs: All pertinent labs within the past 24 hours have been reviewed.    Significant Imaging: I have reviewed all pertinent imaging results/findings within the past 24 hours.

## 2022-03-21 NOTE — PROGRESS NOTES
[facility] Medicine  Telemedicine Progress Note    Patient Name: Pearl Villafana  MRN: [id number]  Patient Class: IP- Inpatient   Admission Date: 3/12/2022  Length of Stay: 9 days  Attending Physician: Drake Villarreal MD  Primary Care Provider: Cleopatra Whaley MD          Subjective:     Principal Problem:Sepsis        HPI:  Calista Kang is an 81-year-old female with a past medical history of alcohol abuse, polypharmacy, colon cancer, breast cancer and hyponatremia along with diabetes hyper tension hyperlipidemia B12 deficiency and gout.  Her family contacted EMS to do a welfare check and they found her down on the ground with altered mental status in urine and feces.  Length of time down on known.  Patient is arousable to tactile touch in the bed.  She is able to tell her name her location and her date of birth.  She does follow simple commands.  Patient answers no to most questions.  She does name objects correctly.  Patient is hypothermic at 95.5° and tachycardic at 104 with moderate bacteriuria in her urinalysis. Code sepsis was called patient received a bolus of normal saline vancomycin and Zosyn.  No signs of respiratory distress.  Patient has some bruising to her right right lateral abdominal area.  No abdominal distension.  No tenderness or guarding to abdominal area.  Patient denies pain.  Patient reports she does not know why mg for how long.  She says that she remembers eating breakfast yesterday and going to some doctor appointments.  There is no family at bedside and her 's phone number is disconnected.  There are no other family members phone numbers in her chart.  Unable to confirm her history or her medications at this time.  Patient admitted to hospital medicine for further workup.      Overview/Hospital Course:  See below      Interval History:    Ms. Villafana was stable overnight with no new symptoms or complaints. She is awaiting placement    BP improved,  still higher in AM, increased losartan to BID for more even coverage    BG improved on detemir QHS    Review of Systems   Constitutional:  Positive for fatigue. Negative for chills and fever.   Respiratory:  Negative for cough and shortness of breath.    Cardiovascular:  Negative for chest pain and leg swelling.   Gastrointestinal:  Positive for diarrhea (improved). Negative for abdominal pain, constipation, nausea and vomiting.   Neurological:  Positive for weakness. Negative for numbness.   Objective:     Vital Signs (Most Recent):  Temp: 98.8 °F (37.1 °C) (03/21/22 0703)  Pulse: 70 (03/21/22 0703)  Resp: 12 (03/21/22 0703)  BP: (!) 170/74 (03/21/22 0840)  SpO2: 95 % (03/21/22 0703)   Vital Signs (24h Range):  Temp:  [97.9 °F (36.6 °C)-99.6 °F (37.6 °C)] 98.8 °F (37.1 °C)  Pulse:  [69-76] 70  Resp:  [12-18] 12  SpO2:  [95 %-98 %] 95 %  BP: (135-185)/(60-79) 170/74     Weight: 68.5 kg (151 lb)  Body mass index is 24.37 kg/m².    Intake/Output Summary (Last 24 hours) at 3/21/2022 0908  Last data filed at 3/21/2022 0745  Gross per 24 hour   Intake 1530 ml   Output 875 ml   Net 655 ml        Physical Exam  Constitutional:       General: She is not in acute distress.  HENT:      Head: Normocephalic and atraumatic.   Eyes:      General: No scleral icterus.        Right eye: No discharge.         Left eye: No discharge.   Pulmonary:      Effort: Pulmonary effort is normal.      Comments: Breathing comfortably  Abdominal:      General: There is no distension.   Musculoskeletal:      Comments: Moves all extremities well   Skin:     Findings: No erythema or rash.   Neurological:      General: No focal deficit present.      Mental Status: She is alert and oriented to person, place, and time.      Comments: Alert, conversant   Psychiatric:         Mood and Affect: Mood normal.         Behavior: Behavior normal.       Significant Labs: All pertinent labs within the past 24 hours have been reviewed.    Significant Imaging: I  have reviewed all pertinent imaging results/findings within the past 24 hours.      Assessment/Plan:      * Sepsis  Patient presented after family contacted EMS to do a welfare check - they found her down on the ground with altered mental status in urine and feces.  Length of time down on known.  Patient was hypothermic at 95.5° and tachycardic at 104 with moderate bacteriuria in her urinalysis.  Code sepsis was called in ED and patient received a bolus of normal saline, as well as IV Vancomycin and Zosyn. Chest x-ray was negative for any consolidation or pleural effusions.  Concern for UTI due to moderate bacteriuria on analysis.  WBC was elevated at 14.77 with left shift, Procalcitonin of 0.34, normal lactic acid, normal ammonia, negative ETOH and Tox screen.  Blood cultures growing 1/4 bottles with Coag-negative Staph (likely contaminant)  and Urine culture was negative. Antibiotics were d/c on 3/14/2022.  Failed Voiding Trial yesterday and Zayas replaced overnight.  Patient is awake and oriented to person, place, month, year, but not to President.  Can do serial 7s to 14.  Awaiting SNF placement.    Plan:  Follow off antibiotics for now - discontinued pharmacy consult order  PT consulted - recommend SNF - patient agrees  Continue Zayas with repeat Voiding Trial while at SNF    Hypertension    - Some improvement but still peak SBP 190s   - Increased losartan from 50 daily to 50 mg BID, which will help provide more even coverage  - Continue coreg at 25 mg PO BID  - Added hydralazine 25 mg PO Q6H PRN SBP > 170   - Monitor and adjust as needed    Diabetes mellitus type II    - Improved,  this AM  - Not associated with long-term insulin use  - Complicated by hyperglycemia, BG still above goal on DM diet. Improves w/ SSI  - Home Meds:  Metformin 500 mg daily at home per chart.    A1c is 6.6  -Continue to hold Metformin at this time.    -Continue Low-dose correction sliding scale   - Continue detemir 5 units  QHS      Decubitus ulcer of back, stage 1  -Continue with wound care      ETOH abuse  Elevated beta hydroxybutyrate.  Ethanol less than 10. Last drink on known.  Patient with history of beer potamania.  Stable without need for benzodiazepine treatment.  -Monitor CIWA scale every 4 hours.      Acute renal failure superimposed on stage 3 chronic kidney disease    - Resolved      Altered mental status, unspecified    - Resolved      Hyponatremia    - Resolved      Colon cancer  History of colon cancer and breast cancer.  Occult stool neg.  H&H stable.  Will monitor.      Hyperlipidemia  On Crestor as outpatient - held this time due to elevation of liver enzymes.        VTE Risk Mitigation (From admission, onward)         Ordered     Reason for No Pharmacological VTE Prophylaxis  Once        Question:  Reasons:  Answer:  Active Bleeding    03/12/22 2041     IP VTE HIGH RISK PATIENT  Once         03/12/22 2041     Place sequential compression device  Until discontinued         03/12/22 2041                      I have assessed these finding virtually using telemed platform and with assistance of bedside nurse                 The attending portion of this evaluation, treatment, and documentation was performed per Drake Villarreal MD via Telemedicine AudioVisual using the secure [employer] software platform with 2 way audio/video. The provider was located off-site and the patient is located in the hospital. The aforementioned video software was utilized to document the relevant history and physical exam    Drake Villarreal MD  Department of Hospital Medicine   [facility] ([place])

## 2022-03-21 NOTE — PLAN OF CARE
Pt remained free of falls and injuries throughout shift. AAOx4. Pt calm and quiet. Purposeful hourly rounding performed. Pt swallows meds whole. IV flushed and saline locked. Zayas catheter in place to gravity. Patient ambulates using walker with standby assist. Dsg to sacrum with scant tan drainage, dry and intact. VSS on room air. Pt resting comfortably in bed, denies pain, denies needs at this time. Bed low and locked, call light in reach. Side rails up x2. Will continue to monitor.

## 2022-03-21 NOTE — ASSESSMENT & PLAN NOTE
- Some improvement but still peak SBP 190s   - Increased losartan from 50 daily to 50 mg BID, which will help provide more even coverage  - Continue coreg at 25 mg PO BID  - Added hydralazine 25 mg PO Q6H PRN SBP > 170   - Monitor and adjust as needed

## 2022-03-21 NOTE — ASSESSMENT & PLAN NOTE
- Improved,  this AM  - Not associated with long-term insulin use  - Complicated by hyperglycemia, BG still above goal on DM diet. Improves w/ SSI  - Home Meds:  Metformin 500 mg daily at home per chart.    A1c is 6.6  -Continue to hold Metformin at this time.    -Continue Low-dose correction sliding scale   - Continue detemir 5 units QHS

## 2022-03-21 NOTE — NURSING
Pt is A&O*4. Ambulatory with assist*1.Wound dressing changed. Next dressing change due on 3.23 or if soiled. Had 2 loose BM. Pending Rehab placement. As per CM, Possible discharge tomorrow to [facility]. Pt worked with PT and OT during shift.   --Losartan increased for better BP control.  -- BG improved with Levemir.     Will cont to monitor.

## 2022-03-22 LAB
POCT GLUCOSE: 176 MG/DL (ref 70–110)
POCT GLUCOSE: 239 MG/DL (ref 70–110)
POCT GLUCOSE: 270 MG/DL (ref 70–110)
POCT GLUCOSE: 290 MG/DL (ref 70–110)
POCT GLUCOSE: 372 MG/DL (ref 70–110)

## 2022-03-22 PROCEDURE — 97530 THERAPEUTIC ACTIVITIES: CPT | Mod: CQ

## 2022-03-22 PROCEDURE — 97530 THERAPEUTIC ACTIVITIES: CPT

## 2022-03-22 PROCEDURE — 25000003 PHARM REV CODE 250: Performed by: PHYSICIAN ASSISTANT

## 2022-03-22 PROCEDURE — 99232 PR SUBSEQUENT HOSPITAL CARE,LEVL II: ICD-10-PCS | Mod: 95,,, | Performed by: INTERNAL MEDICINE

## 2022-03-22 PROCEDURE — 25000003 PHARM REV CODE 250: Performed by: INTERNAL MEDICINE

## 2022-03-22 PROCEDURE — 97116 GAIT TRAINING THERAPY: CPT | Mod: CQ

## 2022-03-22 PROCEDURE — 99232 SBSQ HOSP IP/OBS MODERATE 35: CPT | Mod: 95,,, | Performed by: INTERNAL MEDICINE

## 2022-03-22 PROCEDURE — 11000001 HC ACUTE MED/SURG PRIVATE ROOM

## 2022-03-22 PROCEDURE — 25000003 PHARM REV CODE 250: Performed by: NURSE PRACTITIONER

## 2022-03-22 RX ORDER — AMLODIPINE BESYLATE 2.5 MG/1
2.5 TABLET ORAL DAILY
Status: DISCONTINUED | OUTPATIENT
Start: 2022-03-22 | End: 2022-03-28 | Stop reason: HOSPADM

## 2022-03-22 RX ADMIN — LIDOCAINE 5% 1 PATCH: 700 PATCH TOPICAL at 04:03

## 2022-03-22 RX ADMIN — ACETAMINOPHEN 650 MG: 325 TABLET, FILM COATED ORAL at 04:03

## 2022-03-22 RX ADMIN — INSULIN ASPART 2 UNITS: 100 INJECTION, SOLUTION INTRAVENOUS; SUBCUTANEOUS at 04:03

## 2022-03-22 RX ADMIN — MELATONIN TAB 3 MG 6 MG: 3 TAB at 09:03

## 2022-03-22 RX ADMIN — CARVEDILOL 25 MG: 12.5 TABLET, FILM COATED ORAL at 09:03

## 2022-03-22 RX ADMIN — HYDRALAZINE HYDROCHLORIDE 25 MG: 25 TABLET, FILM COATED ORAL at 12:03

## 2022-03-22 RX ADMIN — AMLODIPINE BESYLATE 2.5 MG: 2.5 TABLET ORAL at 09:03

## 2022-03-22 RX ADMIN — INSULIN ASPART 3 UNITS: 100 INJECTION, SOLUTION INTRAVENOUS; SUBCUTANEOUS at 11:03

## 2022-03-22 RX ADMIN — LOSARTAN POTASSIUM 50 MG: 50 TABLET, FILM COATED ORAL at 09:03

## 2022-03-22 RX ADMIN — INSULIN ASPART 1 UNITS: 100 INJECTION, SOLUTION INTRAVENOUS; SUBCUTANEOUS at 09:03

## 2022-03-22 NOTE — PROGRESS NOTES
[facility] ([place])  Adult Nutrition  Progress Note    SUMMARY       Recommendations   1.Recommend SLP consult- to help with correct modifications for diet.   2.Continue Boost Plus TID.   3.Encourage good PO intake.   4. Recommend Aman BID to promote wound healing as tolerated.    Goals: Pt to advance diet with texture/ consistency as tolerated with ONS by RD f/u.  Nutrition Goal Status: progressing towards goal  Communication of RD Recs:  (POC)    Assessment and Plan    Inadequate oral intake  Contributing Nutrition Diagnosis  Inadequate oral intake    Related to (etiology):   Decreased appetite/ increase needs- wounds     Signs and Symptoms (as evidenced by):   PO intake %  puree diet with ONS     Interventions/Recommendations (treatment strategy):  Collaboration with other healthcare providers  SLP recs per diet  ONS    Nutrition Diagnosis Status:   Continues    Malnutrition Assessment     Skin (Micronutrient):  (Juan Score 14)      Reason for Assessment    Reason For Assessment: RD follow-up  Diagnosis: infection/sepsis  Relevant Medical History: alcohol abuse, polypharmacy, colon cancer, breast cancer, hyponatremia, DM, HTN, HLD, vit B12 def, gout  Interdisciplinary Rounds: did not attend  General Information Comments: 3/22- RD f/u. Pt on 2000kcal DM pureed diet. ONS- Boost Plus TID. Tolerating meals. No complaints. Pt is asking for toast for breakfast- explained not appropriate for current diet 2/2 puree restrictions. Will recommend SLP consult. Reports good appetite. Mentation improving. Possible d/c soon.    Nutrition Discharge Planning: Pt to follow a DM/cardiac diet with texture/ consistent as tolerated.- cessation of alcohol    Nutrition Risk Screen    Nutrition Risk Screen: no indicators present    Nutrition/Diet History    Spiritual, Cultural Beliefs, Denominational Practices, Values that Affect Care: no  Factors Affecting Nutritional Intake: decreased appetite, chewing difficulties/inability  "to chew food, difficulty/impaired swallowing, impaired cognitive status/motor control    Anthropometrics    Temp: 98.3 °F (36.8 °C)  Height Method: Stated  Height: 5' 6" (167.6 cm)  Height (inches): 66 in  Weight Method: Bed Scale  Weight: 68.5 kg (151 lb)  Weight (lb): 151 lb  Ideal Body Weight (IBW), Female: 130 lb  % Ideal Body Weight, Female (lb): 116.15 %  BMI (Calculated): 24.4  BMI Grade: 18.5-24.9 - normal    Lab/Procedures/Meds    Pertinent Labs Reviewed: reviewed  Pertinent Labs Comments: none  Pertinent Medications Reviewed: reviewed  Pertinent Medications Comments: Amlodipine, carvedilol, insulin detemir, losartan    Estimated/Assessed Needs    Weight Used For Calorie Calculations: 68.5 kg (151 lb 0.2 oz)  Energy Calorie Requirements (kcal): 1600 kcal day (MSJ X AF 1.4)  Energy Need Method: Barnes-St Jeor  Protein Requirements: 68-82 g day (1.0-1.2 g/kg)  Weight Used For Protein Calculations: 68.5 kg (151 lb 0.2 oz)   Estimated Fluid Requirement Method: RDA Method  RDA Method (mL): 1600  CHO Requirement: 200 gday    Nutrition Prescription Ordered    Current Diet Order: puree diet  Oral Nutrition Supplement: Boost Glucose TID    Evaluation of Received Nutrient/Fluid Intake    Energy Calories Required: meeting needs  Protein Required: meeting needs  Fluid Required: meeting needs  % Intake of Estimated Energy Needs: 75 - 100 %  % Meal Intake: 75 - 100 %    Nutrition Risk    Level of Risk/Frequency of Follow-up:  (1-2 x weekly)     Monitor and Evaluation    Food and Nutrient Intake: energy intake, food and beverage intake  Food and Nutrient Adminstration: diet order  Knowledge/Beliefs/Attitudes: food and nutrition knowledge/skill, beliefs and attitudes  Physical Activity and Function: nutrition-related ADLs and IADLs  Anthropometric Measurements: weight, weight change, body mass index, growth pattern indices/percentile ranks  Biochemical Data, Medical Tests and Procedures: glucose/endocrine profile, lipid " profile, electrolyte and renal panel, gastrointestinal profile, inflammatory profile  Nutrition-Focused Physical Findings: overall appearance     Nutrition Follow-Up    RD Follow-up?: Yes

## 2022-03-22 NOTE — ASSESSMENT & PLAN NOTE
Contributing Nutrition Diagnosis  Inadequate oral intake    Related to (etiology):   Decreased appetite/ increase needs- wounds     Signs and Symptoms (as evidenced by):   PO intake %  puree diet with ONS     Interventions/Recommendations (treatment strategy):  Collaboration with other healthcare providers  SLP recs per diet  ONS    Nutrition Diagnosis Status:   Continues

## 2022-03-22 NOTE — ASSESSMENT & PLAN NOTE
- Still elevated  - Not associated with long-term insulin use  - Complicated by hyperglycemia, BG still above goal on DM diet. Improves w/ SSI  - Home Meds:  Metformin 500 mg daily at home per chart.    A1c is 6.6  -Continue to hold Metformin at this time.    -Continue Low-dose correction sliding scale   - Increased detemir to 5 units BID

## 2022-03-22 NOTE — PHYSICIAN QUERY
PT Name: Pearl Villafana  MR #: [id number]     DOCUMENTATION CLARIFICATION     CDS/: MARIAJOSE Urbano, RN, CDS               Contact information:[email or web address]   This form is a permanent document in the medical record.     Query Date: March 22, 2022    By submitting this query, we are merely seeking further clarification of documentation.  Please utilize your independent clinical judgment when addressing the question(s) below.    The Medical Record contains the following:   Indicators   Supporting Clinical Findings Location in Medical Record    Non-blanchable erythema/redness      Ulcer/Injury/Skin Breakdown        Deep Tissue Injury     X Wound care consult  DTI has presented to sacral area.     Altered Skin Integrity 03/14/22 1530 Sacral spine Purple or maroon localized area of discolored intact skin or non-intact skin or a blood-filled blister.         Follow up on sacral DTI  Area of concern is smaller with 7x5cm of redness and center of purple blistering tissue.   Small area of denuded skin noted.         Purple or maroon localized area of discolored intact skin or non-intact skin or a blood-filled blister.      Wound care, 3/14                                                     Wound care, 3/18   X Acute/Chronic Illness  Past medical history of alcohol abuse, polypharmacy, colon cancer, breast cancer and hyponatremia along with diabetes hyper tension hyperlipidemia B12 deficiency and gout.  Her family contacted EMS to do a welfare check and they found her down on the ground with altered mental status in urine and feces.  Length of time down on known    Wound care, 3/14   X Medication/Treatment  Pt continually soiling with liquid stools  Feel barrier cream is best prevention to sacral area and gluteal cleft at this time.     Wound care, 3/14    Other       The clinical guidelines noted are only a system guideline. It does not replace the providers clinical judgment.    Per the National Pressure  Injury Advisory Panel:   A pressure injury is localized damage to the skin and underlying soft tissue usually over a bony prominence or related to a medical or other device. The injury can present as intact skin or an open ulcer and may be painful. The injury occurs as a result of intense and/or prolonged pressure or pressure in combination with shear. The tolerance of soft tissue for pressure and shear may also be affected by microclimate, nutrition, perfusion, co-morbidities and condition of the soft tissue.       Deep Tissue Pressure Injury:  Intact or non-intact skin with localized area of persistent non-blanchable deep red, maroon, purple discoloration or epidermal separation revealing a dark wound bed or blood filled blister. This injury results from intense and/or prolonged pressure and shear forces at the bone-muscle interface. The wound may evolve rapidly to reveal the actual extent of tissue injury, or may resolve without tissue loss. If necrotic tissue, subcutaneous tissue, granulation tissue, fascia, muscle or other underlying structures are visible, this indicates a full thickness pressure injury (Unstageable, Stage 3 or Stage 4). Do not use DTPI to describe vascular, traumatic, neuropathic, or dermatologic conditions.       Provider, please provide the integumentary diagnosis related to the documentation of Sacrum:     [   ] Deep Tissue Pressure Injury   [   ] Other Integumentary Diagnosis (please specify):______________   [ x ] Clinically Undetermined                 Please document in your progress notes daily for the duration of treatment until resolved and include in your discharge summary.    Reference:    SOPHIA Bagley., AALIYAH Kay., Goldberg, M., MANN Curry., MANN Sanchez., & MCKAYLA Daily. (2016). Revised National Pressure Ulcer Advisory Panel Pressure Injury Staging System: Revised Pressure Injury Staging System. J Wound Ostomy Continence Nurs, 43(6), 585-597.  doi:10.1097/won.5174009211023880    Form No.[id number]

## 2022-03-22 NOTE — PHYSICIAN QUERY
PT Name: Pearl Villafana  MR #: [id number]     DOCUMENTATION CLARIFICATION     CDS/: MARIAJOSE Urbano, RN, CDS               Contact information:[email or web address]   This form is a permanent document in the medical record.     Query Date: March 22, 2022    By submitting this query, we are merely seeking further clarification of documentation.  Please utilize your independent clinical judgment when addressing the question(s) below.    The Medical Record contains the following:   Indicators   Supporting Clinical Findings Location in Medical Record    Non-blanchable erythema/redness      Ulcer/Injury/Skin Breakdown      Deep Tissue Injury     X Wound care consult  Right heel with DTI         Altered Skin Integrity 03/12/22 2115 Right posterior Heel #1 Other (comment) Purple or maroon localized area of discolored intact skin or non-intact skin or a blood-filled blister.       Left heel and lateral foot with a Stage 1 non blanching area of erythema identified.     Altered Skin Integrity 03/14/22 1530 Left anterior Foot Intact skin with non-blanchable redness of localized area          Wound care, 3/14   X Acute/Chronic Illness  Past medical history of alcohol abuse, polypharmacy, colon cancer, breast cancer and hyponatremia along with diabetes hyper tension hyperlipidemia B12 deficiency and gout.  Her family contacted EMS to do a welfare check and they found her down on the ground with altered mental status in urine and feces.  Length of time down on known    Wound care, 3/14   X Medication/Treatment  Mepilex heel dressing applied and offloaded with boots    Wound care, 3/14    Other       The clinical guidelines noted are only a system guideline. It does not replace the providers clinical judgment.    Per the National Pressure Injury Advisory Panel:   A pressure injury is localized damage to the skin and underlying soft tissue usually over a bony prominence or related to a medical or other device. The injury  can present as intact skin or an open ulcer and may be painful. The injury occurs as a result of intense and/or prolonged pressure or pressure in combination with shear. The tolerance of soft tissue for pressure and shear may also be affected by microclimate, nutrition, perfusion, co-morbidities and condition of the soft tissue.       Stage 1 Pressure Injury:  Intact skin with a localized area of non-blanchable erythema, which may appear differently in darkly pigmented skin. Color changes do not include purple or maroon discoloration; these may indicate deep tissue pressure injury.    Stage 2 Pressure Injury:  Partial-thickness loss of skin with exposed dermis. The wound bed is viable, pink or red, moist, and may also present as an intact or ruptured serum-filled blister.    Stage 3 Pressure Injury:  Full-thickness loss of skin, in which adipose (fat) is visible in the ulcer and granulation tissue and epibole (rolled wound edges) are often present. Slough and/or eschar may be visible. Undermining and tunneling may occur.    Stage 4 Pressure Injury:  Full-thickness skin and tissue loss with exposed or directly palpable fascia, muscle, tendon, ligament, cartilage or bone in the ulcer. Slough and/or eschar may be visible. Epibole (rolled edges), undermining and/or tunneling often occur.    Unstageable Pressure Injury:  Full-thickness skin and tissue loss in which the extent of tissue damage within the ulcer cannot be confirmed because it is obscured by slough or eschar. If slough or eschar is removed, a Stage 3 or Stage 4 pressure injury will be revealed.    Deep Tissue Pressure Injury:  Intact or non-intact skin with localized area of persistent non-blanchable deep red, maroon, purple discoloration or epidermal separation revealing a dark wound bed or blood filled blister. This injury results from intense and/or prolonged pressure and shear forces at the bone-muscle interface. The wound may evolve rapidly to reveal  the actual extent of tissue injury, or may resolve without tissue loss. If necrotic tissue, subcutaneous tissue, granulation tissue, fascia, muscle or other underlying structures are visible, this indicates a full thickness pressure injury (Unstageable, Stage 3 or Stage 4). Do not use DTPI to describe vascular, traumatic, neuropathic, or dermatologic conditions.       Provider, please provide the integumentary diagnosis related to the documentation of  Right Heel:     [   ] Deep Tissue Pressure Injury   [   ] Other Integumentary Diagnosis (please specify):______________   [ x ] Clinically Undetermined     Provider, please provide the integumentary diagnosis related to the documentation of  Left Heel:     [   ] Pressure Injury/Decubitus Ulcer, Stage 1   [   ] Other Integumentary Diagnosis (please specify):______________   [ x ] Clinically Undetermined     Provider, please provide the integumentary diagnosis related to the documentation of  Left Lateral Foot:     [   ] Pressure Injury/Decubitus Ulcer, Stage 1   [   ] Other Integumentary Diagnosis (please specify):______________   [ x ] Clinically Undetermined             Please document in your progress notes daily for the duration of treatment until resolved and include in your discharge summary.    Reference:    SOPHIA Bagley., Rahul, J. MCKAYLA., Goldberg, M., MANN Curry., MANN Sanchez., & MCKAYLA Daily. (2016). Revised National Pressure Ulcer Advisory Panel Pressure Injury Staging System: Revised Pressure Injury Staging System. J Wound Ostomy Continence Nurs, 43(6), 585-597. doi:10.1097/won.[id number]    Form No.[id number]

## 2022-03-22 NOTE — PLAN OF CARE
POC reviewed w/ pt and purposeful rounding complete. Meds administered per MAR. Blood glucose monitored and PRN insulin administered per order. PRN hydralazine administered for elevated BP w/ relief obtained. Pt denies pain and SOB this shift. VSS on RA. Pt AAOx4 and 2 assist to ambulate. Safety precautions intact; no injuries or falls this shift. Pt resting w/ eyes closed and visible chest rise at this time; will continue to monitor.

## 2022-03-22 NOTE — ASSESSMENT & PLAN NOTE
- Still elevated  - Continue losartan 50 mg BID  - Continue coreg at 25 mg PO BID  - Continue hydralazine 25 mg PO Q6H PRN SBP > 170   - Added amlodipine 5 mg daily  - Monitor and adjust as needed

## 2022-03-22 NOTE — PT/OT/SLP PROGRESS
Physical Therapy Treatment    Patient Name:  Pearl Villafana   MRN:  [id number]    Recommendations:     Discharge Recommendations:  nursing facility, skilled   Discharge Equipment Recommendations: walker, rolling   Barriers to discharge: None to SNF    Assessment:     Pearl Villafana is a 81 y.o. female admitted with a medical diagnosis of Sepsis.  She presents with the following impairments/functional limitations:  weakness, pain, edema, impaired balance, impaired cardiopulmonary response to activity, impaired endurance, impaired functional mobilty, impaired self care skills.    Supine>sit SBA  Sit>stand with RW and CGA from bed (Nasreen from chair), cues for hand placement on sit surface  Bed>BSC with RW and CGA, step transfer  Static standing x 2 mins with RW and SBA for natividad hygiene  BSC>Chair with RW and CGA, step transfer  Amb 20' with RW and CGA-SBA, cues for step-through gait pattern  Pt performed multiple transfers and was able to amb 20' this visit, progressing toward all goals  Rec SNF    Rehab Prognosis: Good; patient would benefit from acute skilled PT services to address these deficits and reach maximum level of function.    Recent Surgery: * No surgery found *      Plan:     During this hospitalization, patient to be seen 5 x/week to address the identified rehab impairments via gait training, therapeutic activities, therapeutic exercises, neuromuscular re-education and progress toward the following goals:    · Plan of Care Expires:  04/13/22    Subjective     Chief Complaint: I made a mess in the bed  Patient/Family Comments/goals: I'll try  Pain/Comfort:  · Pain Rating 1: 0/10  · Pain Rating Post-Intervention 1: 0/10      Objective:     Communicated with nurse Coates prior to session.  Patient found HOB elevated with peripheral IV, telemetry, mcdonald catheter upon PT entry to room.     General Precautions: Standard, fall   Orthopedic Precautions:N/A   Braces:    Respiratory Status: Room air     Pt found  soiled with stool in bed    Functional Mobility:  · Bed Mobility:     · Supine to Sit: stand by assistance  · Transfers:     · Sit to Stand:  contact guard assistance and minimum assistance with rolling walker  · Toilet Transfer: contact guard assistance with  rolling walker  using  Step Transfer  · Gait: 20' with RW and CGA-SBA, cues for step-through gait pattern      AM-PAC 6 CLICK MOBILITY  Turning over in bed (including adjusting bedclothes, sheets and blankets)?: 3  Sitting down on and standing up from a chair with arms (e.g., wheelchair, bedside commode, etc.): 3  Moving from lying on back to sitting on the side of the bed?: 3  Moving to and from a bed to a chair (including a wheelchair)?: 3  Need to walk in hospital room?: 2  Climbing 3-5 steps with a railing?: 2  Basic Mobility Total Score: 16       Therapeutic Activities and Exercises:   Transfer and gait training as noted    Patient left up in chair with all lines intact, call button in reach and nurse Aminata notified..    GOALS:   Multidisciplinary Problems     Physical Therapy Goals        Problem: Physical Therapy Goal    Goal Priority Disciplines Outcome Goal Variances Interventions   Physical Therapy Goal     PT, PT/OT Ongoing, Progressing     Description: Goals to be met by: 2022    Patient will increase functional independence with mobility by performin. Pt will be able to transfer supine to sit with SBA MET 3/21/2022  2. Sit<>stand with CGA with RW. MET 3/21/2022  3. Gait x 50 feet with CGA with RW.                        Time Tracking:     PT Received On: 22  PT Start Time:      PT Stop Time: 1447  PT Total Time (min): 30 min     Billable Minutes: Gait Training 15 and Therapeutic Activity 15    Treatment Type: Treatment  PT/PTA: PTA     PTA Visit Number: 4     2022

## 2022-03-22 NOTE — PROGRESS NOTES
[facility] Medicine  Telemedicine Progress Note    Patient Name: Pearl Villafana  MRN: [id number]  Patient Class: IP- Inpatient   Admission Date: 3/12/2022  Length of Stay: 10 days  Attending Physician: Drake Villarreal MD  Primary Care Provider: Cleopatra Whaley MD          Subjective:     Principal Problem:Sepsis        HPI:  Calista Kang is an 81-year-old female with a past medical history of alcohol abuse, polypharmacy, colon cancer, breast cancer and hyponatremia along with diabetes hyper tension hyperlipidemia B12 deficiency and gout.  Her family contacted EMS to do a welfare check and they found her down on the ground with altered mental status in urine and feces.  Length of time down on known.  Patient is arousable to tactile touch in the bed.  She is able to tell her name her location and her date of birth.  She does follow simple commands.  Patient answers no to most questions.  She does name objects correctly.  Patient is hypothermic at 95.5° and tachycardic at 104 with moderate bacteriuria in her urinalysis. Code sepsis was called patient received a bolus of normal saline vancomycin and Zosyn.  No signs of respiratory distress.  Patient has some bruising to her right right lateral abdominal area.  No abdominal distension.  No tenderness or guarding to abdominal area.  Patient denies pain.  Patient reports she does not know why mg for how long.  She says that she remembers eating breakfast yesterday and going to some doctor appointments.  There is no family at bedside and her 's phone number is disconnected.  There are no other family members phone numbers in her chart.  Unable to confirm her history or her medications at this time.  Patient admitted to hospital medicine for further workup.      Overview/Hospital Course:  See below      Interval History:    Ms. Villafana was stable overnight with no new symptoms or complaints. She is awaiting placement    BP and BG  improved, amlodipine added and detemir increased to BID    Review of Systems   Constitutional:  Positive for fatigue. Negative for chills and fever.   Respiratory:  Negative for cough and shortness of breath.    Cardiovascular:  Negative for chest pain and leg swelling.   Gastrointestinal:  Positive for diarrhea (improved). Negative for abdominal pain, constipation, nausea and vomiting.   Neurological:  Positive for weakness. Negative for numbness.   Objective:     Vital Signs (Most Recent):  Temp: 98.2 °F (36.8 °C) (03/22/22 0801)  Pulse: 67 (03/22/22 0801)  Resp: 16 (03/22/22 0801)  BP: (!) 184/77 (03/22/22 0801)  SpO2: 96 % (03/22/22 0801)   Vital Signs (24h Range):  Temp:  [97.7 °F (36.5 °C)-98.6 °F (37 °C)] 98.2 °F (36.8 °C)  Pulse:  [64-79] 67  Resp:  [12-18] 16  SpO2:  [96 %-98 %] 96 %  BP: (124-206)/(58-81) 184/77     Weight: 68.5 kg (151 lb)  Body mass index is 24.37 kg/m².    Intake/Output Summary (Last 24 hours) at 3/22/2022 0931  Last data filed at 3/22/2022 0800  Gross per 24 hour   Intake 480 ml   Output 1750 ml   Net -1270 ml        Physical Exam  Constitutional:       General: She is not in acute distress.  HENT:      Head: Normocephalic and atraumatic.   Eyes:      General: No scleral icterus.        Right eye: No discharge.         Left eye: No discharge.   Pulmonary:      Effort: Pulmonary effort is normal.      Comments: Breathing comfortably  Abdominal:      General: There is no distension.   Musculoskeletal:      Comments: Moves all extremities well   Skin:     Findings: No erythema or rash.   Neurological:      General: No focal deficit present.      Mental Status: She is alert and oriented to person, place, and time.      Comments: Alert, conversant   Psychiatric:         Mood and Affect: Mood normal.         Behavior: Behavior normal.       Significant Labs: All pertinent labs within the past 24 hours have been reviewed.    Significant Imaging: I have reviewed all pertinent imaging  results/findings within the past 24 hours.      Assessment/Plan:      * Sepsis  Patient presented after family contacted EMS to do a welfare check - they found her down on the ground with altered mental status in urine and feces.  Length of time down on known.  Patient was hypothermic at 95.5° and tachycardic at 104 with moderate bacteriuria in her urinalysis.  Code sepsis was called in ED and patient received a bolus of normal saline, as well as IV Vancomycin and Zosyn. Chest x-ray was negative for any consolidation or pleural effusions.  Concern for UTI due to moderate bacteriuria on analysis.  WBC was elevated at 14.77 with left shift, Procalcitonin of 0.34, normal lactic acid, normal ammonia, negative ETOH and Tox screen.  Blood cultures growing 1/4 bottles with Coag-negative Staph (likely contaminant)  and Urine culture was negative. Antibiotics were d/c on 3/14/2022.  Failed Voiding Trial yesterday and Zayas replaced overnight.  Patient is awake and oriented to person, place, month, year, but not to President.  Can do serial 7s to 14.  Awaiting SNF placement.    Plan:  Follow off antibiotics for now - discontinued pharmacy consult order  PT consulted - recommend SNF - patient agrees  Continue Zayas with repeat Voiding Trial while at SNF    Hypertension    - Still elevated  - Continue losartan 50 mg BID  - Continue coreg at 25 mg PO BID  - Continue hydralazine 25 mg PO Q6H PRN SBP > 170   - Added amlodipine 5 mg daily  - Monitor and adjust as needed    Diabetes mellitus type II    - Still elevated  - Not associated with long-term insulin use  - Complicated by hyperglycemia, BG still above goal on DM diet. Improves w/ SSI  - Home Meds:  Metformin 500 mg daily at home per chart.    A1c is 6.6  -Continue to hold Metformin at this time.    -Continue Low-dose correction sliding scale   - Increased detemir to 5 units BID      Decubitus ulcer of back, stage 1  -Continue with wound care      ETOH abuse  Elevated beta  hydroxybutyrate.  Ethanol less than 10. Last drink on known.  Patient with history of beer potamania.  Stable without need for benzodiazepine treatment.  -Monitor CIWA scale every 4 hours.      Acute renal failure superimposed on stage 3 chronic kidney disease    - Resolved      Altered mental status, unspecified    - Resolved      Hyponatremia    - Resolved      Colon cancer  History of colon cancer and breast cancer.  Occult stool neg.  H&H stable.  Will monitor.      Hyperlipidemia  On Crestor as outpatient - held this time due to elevation of liver enzymes.        VTE Risk Mitigation (From admission, onward)         Ordered     Reason for No Pharmacological VTE Prophylaxis  Once        Question:  Reasons:  Answer:  Active Bleeding    03/12/22 2041     IP VTE HIGH RISK PATIENT  Once         03/12/22 2041     Place sequential compression device  Until discontinued         03/12/22 2041                      I have assessed these finding virtually using telemed platform and with assistance of bedside nurse                 The attending portion of this evaluation, treatment, and documentation was performed per Drake Villarreal MD via Telemedicine AudioVisual using the secure [employer] software platform with 2 way audio/video. The provider was located off-site and the patient is located in the hospital. The aforementioned video software was utilized to document the relevant history and physical exam    Drake Villarreal MD  Department of Hospital Medicine   [facility] ([place])

## 2022-03-22 NOTE — PT/OT/SLP PROGRESS
Occupational Therapy   Treatment    Name: Pearl Villafana  MRN: [id number]  Admitting Diagnosis:  Sepsis       Recommendations:     Discharge Recommendations: nursing facility, skilled  Discharge Equipment Recommendations:   (TBD at next level of care)  Barriers to discharge:  None    Assessment:     Pearl Villafana is a 81 y.o. female with a medical diagnosis of Sepsis.  She presents with fatigue. Performance deficits affecting function are weakness, impaired endurance, impaired self care skills, impaired functional mobilty, gait instability, impaired balance, impaired cardiopulmonary response to activity.  With encouragement pt agreeable to participating in therapy in chair level activity upon arrival to room.  Pt tolerated UB exercises well; however, appeared slightly SOB at end of session.    She is making progress towards goals and would continue to benefit from skilled OT services to address problems listed above and increase independence with ADLs.  SNF is recommended upon d/c from acute care to further address deficits and help pt improve overall functional independence.          Rehab Prognosis:  Good; patient would benefit from acute skilled OT services to address these deficits and reach maximum level of function.       Plan:     Patient to be seen 5 x/week to address the above listed problems via self-care/home management, therapeutic activities, therapeutic exercises  · Plan of Care Expires: 04/13/22  · Plan of Care Reviewed with: patient    Subjective     Pain/Comfort:  · Pain Rating 1: 0/10  · Pain Rating Post-Intervention 1: 0/10    Objective:     Communicated with: RN prior to session.  Patient found up in chair with peripheral IV, telemetry, mcdonald catheter upon OT entry to room.  *Upon second attempt to room to work with pt, pt seated up in chair reporting she had just been up with PT and walked and gotten to [facility].  Pt stated she was agreeable to exercises while seated up in chair.    General  Precautions: Standard, fall   Orthopedic Precautions:N/A   Braces: N/A  Respiratory Status: Room air     Occupational Performance:     Bed Mobility:    · Not assessed 2* pt up in chair upon arrival    Functional Mobility/Transfers:  · Sit <> Stand: Unable to assess 2* pt requested to stay in chair during session  · Functional Mobility: To be assessed in upcoming sessions    Activities of Daily Living:  · Grooming: Set up for applying deoderant/spray while seated up in chair.       AMPAC 6 Click ADL: 15    Treatment & Education:  *Pt educated on role of OT in acute care setting.  *Pt performed UB exercises to promote increased endurance and ROM needed for ADLs: 2 sets x 10 reps per exercise; seated up in chair  -Shoulder flexion  -Overhead claps  -Shoulder abduction/adduction to 90 degrees with scapular squeeze  -Arm bike  *POC reviewed with pt      Patient left up in chair with all lines intact and call button in reachEducation:      GOALS:   Multidisciplinary Problems     Occupational Therapy Goals        Problem: Occupational Therapy Goal    Goal Priority Disciplines Outcome Interventions   Occupational Therapy Goal     OT, PT/OT Ongoing, Progressing    Description: Goals to be met by: 3/28/2022    Patient will increase functional independence with ADLs by performing:    UE Dressing with Minimal Assistance.  LE Dressing with Maximum Assistance.  Grooming while standing at sink with Minimal Assistance.  Toileting from toilet with Minimal Assistance for hygiene and clothing management.   Toilet transfer to toilet with Minimal Assistance.  Step transfer with Minimal Assistance.                     Time Tracking:     OT Date of Treatment: 03/22/22  OT Start Time: 1459  OT Stop Time: 1511  OT Total Time (min): 12 min    Billable Minutes:Therapeutic Activity 12    OT/MIGUEL: OT          3/22/2022

## 2022-03-22 NOTE — PLAN OF CARE
CM has spoken to Denise at [facility] several times today. Pt's daughter is attempting to obtain bank records, discharge delayed for today.  Denise will follow-up with this CM tomorrow morning, 3/23/22.

## 2022-03-22 NOTE — SUBJECTIVE & OBJECTIVE
Interval History:    Ms. Villafana was stable overnight with no new symptoms or complaints. She is awaiting placement    BP and BG improved, amlodipine added and detemir increased to BID    Review of Systems   Constitutional:  Positive for fatigue. Negative for chills and fever.   Respiratory:  Negative for cough and shortness of breath.    Cardiovascular:  Negative for chest pain and leg swelling.   Gastrointestinal:  Positive for diarrhea (improved). Negative for abdominal pain, constipation, nausea and vomiting.   Neurological:  Positive for weakness. Negative for numbness.   Objective:     Vital Signs (Most Recent):  Temp: 98.2 °F (36.8 °C) (03/22/22 0801)  Pulse: 67 (03/22/22 0801)  Resp: 16 (03/22/22 0801)  BP: (!) 184/77 (03/22/22 0801)  SpO2: 96 % (03/22/22 0801)   Vital Signs (24h Range):  Temp:  [97.7 °F (36.5 °C)-98.6 °F (37 °C)] 98.2 °F (36.8 °C)  Pulse:  [64-79] 67  Resp:  [12-18] 16  SpO2:  [96 %-98 %] 96 %  BP: (124-206)/(58-81) 184/77     Weight: 68.5 kg (151 lb)  Body mass index is 24.37 kg/m².    Intake/Output Summary (Last 24 hours) at 3/22/2022 0931  Last data filed at 3/22/2022 0800  Gross per 24 hour   Intake 480 ml   Output 1750 ml   Net -1270 ml        Physical Exam  Constitutional:       General: She is not in acute distress.  HENT:      Head: Normocephalic and atraumatic.   Eyes:      General: No scleral icterus.        Right eye: No discharge.         Left eye: No discharge.   Pulmonary:      Effort: Pulmonary effort is normal.      Comments: Breathing comfortably  Abdominal:      General: There is no distension.   Musculoskeletal:      Comments: Moves all extremities well   Skin:     Findings: No erythema or rash.   Neurological:      General: No focal deficit present.      Mental Status: She is alert and oriented to person, place, and time.      Comments: Alert, conversant   Psychiatric:         Mood and Affect: Mood normal.         Behavior: Behavior normal.       Significant Labs: All  pertinent labs within the past 24 hours have been reviewed.    Significant Imaging: I have reviewed all pertinent imaging results/findings within the past 24 hours.

## 2022-03-22 NOTE — PLAN OF CARE
Recommendations   1.Recommend SLP consult- to help with correct modifications for diet.   2.Continue Boost Plus TID.   3.Encourage good PO intake.   4. Recommend Aman BID to promote wound healing as tolerated.    Goals: Pt to advance diet with texture/ consistency as tolerated with ONS by RD f/u.  Nutrition Goal Status: progressing towards goal  Communication of RD Recs:  (POC)

## 2022-03-23 LAB
POCT GLUCOSE: 134 MG/DL (ref 70–110)
POCT GLUCOSE: 257 MG/DL (ref 70–110)
POCT GLUCOSE: 266 MG/DL (ref 70–110)

## 2022-03-23 PROCEDURE — 25000003 PHARM REV CODE 250: Performed by: INTERNAL MEDICINE

## 2022-03-23 PROCEDURE — 99232 PR SUBSEQUENT HOSPITAL CARE,LEVL II: ICD-10-PCS | Mod: 95,,, | Performed by: INTERNAL MEDICINE

## 2022-03-23 PROCEDURE — 97530 THERAPEUTIC ACTIVITIES: CPT

## 2022-03-23 PROCEDURE — 11000001 HC ACUTE MED/SURG PRIVATE ROOM

## 2022-03-23 PROCEDURE — 25000003 PHARM REV CODE 250: Performed by: NURSE PRACTITIONER

## 2022-03-23 PROCEDURE — 25000003 PHARM REV CODE 250: Performed by: PHYSICIAN ASSISTANT

## 2022-03-23 PROCEDURE — 97110 THERAPEUTIC EXERCISES: CPT

## 2022-03-23 PROCEDURE — 97535 SELF CARE MNGMENT TRAINING: CPT

## 2022-03-23 PROCEDURE — 99232 SBSQ HOSP IP/OBS MODERATE 35: CPT | Mod: 95,,, | Performed by: INTERNAL MEDICINE

## 2022-03-23 RX ORDER — LOSARTAN POTASSIUM 50 MG/1
50 TABLET ORAL 2 TIMES DAILY
Qty: 180 TABLET | Refills: 3
Start: 2022-03-23 | End: 2022-03-28 | Stop reason: SDUPTHER

## 2022-03-23 RX ORDER — AMLODIPINE BESYLATE 2.5 MG/1
2.5 TABLET ORAL DAILY
Qty: 30 TABLET | Refills: 11
Start: 2022-03-24 | End: 2022-03-28 | Stop reason: SDUPTHER

## 2022-03-23 RX ORDER — CARVEDILOL 25 MG/1
25 TABLET ORAL 2 TIMES DAILY
Qty: 60 TABLET | Refills: 11
Start: 2022-03-23 | End: 2022-03-28 | Stop reason: SDUPTHER

## 2022-03-23 RX ORDER — INSULIN ASPART 100 [IU]/ML
0-5 INJECTION, SOLUTION INTRAVENOUS; SUBCUTANEOUS
Refills: 0
Start: 2022-03-23 | End: 2022-03-28 | Stop reason: SDUPTHER

## 2022-03-23 RX ADMIN — INSULIN ASPART 3 UNITS: 100 INJECTION, SOLUTION INTRAVENOUS; SUBCUTANEOUS at 06:03

## 2022-03-23 RX ADMIN — LOSARTAN POTASSIUM 50 MG: 50 TABLET, FILM COATED ORAL at 08:03

## 2022-03-23 RX ADMIN — AMLODIPINE BESYLATE 2.5 MG: 2.5 TABLET ORAL at 08:03

## 2022-03-23 RX ADMIN — ACETAMINOPHEN 650 MG: 325 TABLET, FILM COATED ORAL at 08:03

## 2022-03-23 RX ADMIN — INSULIN ASPART 1 UNITS: 100 INJECTION, SOLUTION INTRAVENOUS; SUBCUTANEOUS at 10:03

## 2022-03-23 RX ADMIN — LIDOCAINE 5% 1 PATCH: 700 PATCH TOPICAL at 04:03

## 2022-03-23 RX ADMIN — CARVEDILOL 25 MG: 12.5 TABLET, FILM COATED ORAL at 08:03

## 2022-03-23 RX ADMIN — MELATONIN TAB 3 MG 6 MG: 3 TAB at 08:03

## 2022-03-23 NOTE — PROGRESS NOTES
[facility] Medicine  Telemedicine Progress Note    Patient Name: Pearl Villafana  MRN: [id number]  Patient Class: IP- Inpatient   Admission Date: 3/12/2022  Length of Stay: 11 days  Attending Physician: Drake Villarreal MD  Primary Care Provider: Cleopatra Whaley MD          Subjective:     Principal Problem:Sepsis        HPI:  Calista Kang is an 81-year-old female with a past medical history of alcohol abuse, polypharmacy, colon cancer, breast cancer and hyponatremia along with diabetes hyper tension hyperlipidemia B12 deficiency and gout.  Her family contacted EMS to do a welfare check and they found her down on the ground with altered mental status in urine and feces.  Length of time down on known.  Patient is arousable to tactile touch in the bed.  She is able to tell her name her location and her date of birth.  She does follow simple commands.  Patient answers no to most questions.  She does name objects correctly.  Patient is hypothermic at 95.5° and tachycardic at 104 with moderate bacteriuria in her urinalysis. Code sepsis was called patient received a bolus of normal saline vancomycin and Zosyn.  No signs of respiratory distress.  Patient has some bruising to her right right lateral abdominal area.  No abdominal distension.  No tenderness or guarding to abdominal area.  Patient denies pain.  Patient reports she does not know why mg for how long.  She says that she remembers eating breakfast yesterday and going to some doctor appointments.  There is no family at bedside and her 's phone number is disconnected.  There are no other family members phone numbers in her chart.  Unable to confirm her history or her medications at this time.  Patient admitted to hospital medicine for further workup.      Overview/Hospital Course:  See below      Interval History:    Ms. Villafana was stable overnight with no new symptoms or complaints. She is awaiting placement. BP and BG both  improved, no changes today.    Review of Systems   Constitutional:  Positive for fatigue. Negative for chills and fever.   Respiratory:  Negative for cough and shortness of breath.    Cardiovascular:  Negative for chest pain and leg swelling.   Gastrointestinal:  Positive for diarrhea (improved). Negative for abdominal pain, constipation, nausea and vomiting.   Neurological:  Positive for weakness. Negative for numbness.   Objective:     Vital Signs (Most Recent):  Temp: 97.6 °F (36.4 °C) (03/23/22 0805)  Pulse: 75 (03/23/22 1000)  Resp: 18 (03/23/22 0805)  BP: 135/64 (03/23/22 1000)  SpO2: 96 % (03/23/22 0805)   Vital Signs (24h Range):  Temp:  [97.6 °F (36.4 °C)-100.1 °F (37.8 °C)] 97.6 °F (36.4 °C)  Pulse:  [68-82] 75  Resp:  [12-18] 18  SpO2:  [96 %-98 %] 96 %  BP: (117-184)/(57-83) 135/64     Weight: 68.5 kg (151 lb)  Body mass index is 24.37 kg/m².    Intake/Output Summary (Last 24 hours) at 3/23/2022 1135  Last data filed at 3/23/2022 1000  Gross per 24 hour   Intake 900 ml   Output 1000 ml   Net -100 ml        Physical Exam  Constitutional:       General: She is not in acute distress.  HENT:      Head: Normocephalic and atraumatic.   Eyes:      General: No scleral icterus.        Right eye: No discharge.         Left eye: No discharge.   Pulmonary:      Effort: Pulmonary effort is normal.      Comments: Breathing comfortably  Abdominal:      General: There is no distension.   Musculoskeletal:      Comments: Moves all extremities well   Skin:     Findings: No erythema or rash.   Neurological:      General: No focal deficit present.      Mental Status: She is alert and oriented to person, place, and time.      Comments: Alert, conversant   Psychiatric:         Mood and Affect: Mood normal.         Behavior: Behavior normal.       Significant Labs: All pertinent labs within the past 24 hours have been reviewed.    Significant Imaging: I have reviewed all pertinent imaging results/findings within the past 24  hours.      Assessment/Plan:      * Sepsis  Patient presented after family contacted EMS to do a welfare check - they found her down on the ground with altered mental status in urine and feces.  Length of time down on known.  Patient was hypothermic at 95.5° and tachycardic at 104 with moderate bacteriuria in her urinalysis.  Code sepsis was called in ED and patient received a bolus of normal saline, as well as IV Vancomycin and Zosyn. Chest x-ray was negative for any consolidation or pleural effusions.  Concern for UTI due to moderate bacteriuria on analysis.  WBC was elevated at 14.77 with left shift, Procalcitonin of 0.34, normal lactic acid, normal ammonia, negative ETOH and Tox screen.  Blood cultures growing 1/4 bottles with Coag-negative Staph (likely contaminant)  and Urine culture was negative. Antibiotics were d/c on 3/14/2022.  Failed Voiding Trial yesterday and Zayas replaced overnight.  Patient is awake and oriented to person, place, month, year, but not to President.  Can do serial 7s to 14.  Awaiting SNF placement.    Plan:  Follow off antibiotics for now - discontinued pharmacy consult order  PT consulted - recommend SNF - patient agrees  Continue Zayas with repeat Voiding Trial while at SNF    Hypertension    - Improved, last BP normal  - Continue losartan 50 mg BID  - Continue coreg at 25 mg PO BID  - Continue hydralazine 25 mg PO Q6H PRN SBP > 170   - Continue amlodipine 5 mg daily  - Monitor and adjust as needed    Diabetes mellitus type II    - Improving, last BG at goal, only mild elevations over last 24h  - Not associated with long-term insulin use  - Complicated by hyperglycemia, BG still above goal on DM diet. Improves w/ SSI  - Home Meds:  Metformin 500 mg daily at home per chart.    A1c is 6.6  -Continue to hold Metformin at this time.    -Continue Low-dose correction sliding scale   - Continue detemir 5 units BID      Decubitus ulcer of back, stage 1  -Continue with wound care      ETOH  abuse  Elevated beta hydroxybutyrate.  Ethanol less than 10. Last drink on known.  Patient with history of beer potamania.  Stable without need for benzodiazepine treatment.  -Monitor CIWA scale every 4 hours.      Acute renal failure superimposed on stage 3 chronic kidney disease    - Resolved      Altered mental status, unspecified    - Resolved      Hyponatremia    - Resolved      Colon cancer  History of colon cancer and breast cancer.  Occult stool neg.  H&H stable.  Will monitor.      Hyperlipidemia  On Crestor as outpatient - held this time due to elevation of liver enzymes.      VTE Risk Mitigation (From admission, onward)         Ordered     Reason for No Pharmacological VTE Prophylaxis  Once        Question:  Reasons:  Answer:  Active Bleeding    03/12/22 2041     IP VTE HIGH RISK PATIENT  Once         03/12/22 2041     Place sequential compression device  Until discontinued         03/12/22 2041                      I have assessed these finding virtually using telemed platform and with assistance of bedside nurse                 The attending portion of this evaluation, treatment, and documentation was performed per Drake Villarreal MD via Telemedicine AudioVisual using the secure Flickr software platform with 2 way audio/video. The provider was located off-site and the patient is located in the hospital. The aforementioned video software was utilized to document the relevant history and physical exam    Drake Villarreal MD  Department of Hospital Medicine   [facility] Med [facility] ([place])

## 2022-03-23 NOTE — PLAN OF CARE
LUISA spoke to Denise at [facility] to inquire about acceptance today. Denise states they still do not have bank records. LUISA met with pt who is alert and oriented to discuss bank. LUISA called [facility] while in room with Ms Villafana. Ms Villafana provided all needed security information to the bank, and records are now released. LUISA emailed records to Denise at [facility].    CM following for plans and arrangements.

## 2022-03-23 NOTE — PLAN OF CARE
Pt remained free of falls and injuries throughout shift. AAOx3-4, intermittently disoriented to time, reoriented. Pt calm and cooperative. Purposeful hourly rounding performed. Pt swallows meds whole. IV flushed and saline locked. Zayas catheter in place to gravity. Patient ambulates using walker with standby assist to BSC. Dsg to sacrum with scant drainage, dry and intact. No c/o pain. Weight shift assist provided q2h. VSS on room air. Pt sleeping in bed, even rise and fall of chest. Bed low and locked, bed alarm on, call light in reach. Side rails up x2. Will continue to monitor.

## 2022-03-23 NOTE — SUBJECTIVE & OBJECTIVE
Interval History:    Ms. Villafana was stable overnight with no new symptoms or complaints. She is awaiting placement. BP and BG both improved, no changes today.    Review of Systems   Constitutional:  Positive for fatigue. Negative for chills and fever.   Respiratory:  Negative for cough and shortness of breath.    Cardiovascular:  Negative for chest pain and leg swelling.   Gastrointestinal:  Positive for diarrhea (improved). Negative for abdominal pain, constipation, nausea and vomiting.   Neurological:  Positive for weakness. Negative for numbness.   Objective:     Vital Signs (Most Recent):  Temp: 97.6 °F (36.4 °C) (03/23/22 0805)  Pulse: 75 (03/23/22 1000)  Resp: 18 (03/23/22 0805)  BP: 135/64 (03/23/22 1000)  SpO2: 96 % (03/23/22 0805)   Vital Signs (24h Range):  Temp:  [97.6 °F (36.4 °C)-100.1 °F (37.8 °C)] 97.6 °F (36.4 °C)  Pulse:  [68-82] 75  Resp:  [12-18] 18  SpO2:  [96 %-98 %] 96 %  BP: (117-184)/(57-83) 135/64     Weight: 68.5 kg (151 lb)  Body mass index is 24.37 kg/m².    Intake/Output Summary (Last 24 hours) at 3/23/2022 1135  Last data filed at 3/23/2022 1000  Gross per 24 hour   Intake 900 ml   Output 1000 ml   Net -100 ml        Physical Exam  Constitutional:       General: She is not in acute distress.  HENT:      Head: Normocephalic and atraumatic.   Eyes:      General: No scleral icterus.        Right eye: No discharge.         Left eye: No discharge.   Pulmonary:      Effort: Pulmonary effort is normal.      Comments: Breathing comfortably  Abdominal:      General: There is no distension.   Musculoskeletal:      Comments: Moves all extremities well   Skin:     Findings: No erythema or rash.   Neurological:      General: No focal deficit present.      Mental Status: She is alert and oriented to person, place, and time.      Comments: Alert, conversant   Psychiatric:         Mood and Affect: Mood normal.         Behavior: Behavior normal.       Significant Labs: All pertinent labs within the  past 24 hours have been reviewed.    Significant Imaging: I have reviewed all pertinent imaging results/findings within the past 24 hours.

## 2022-03-23 NOTE — PT/OT/SLP PROGRESS
Occupational Therapy   Treatment    Name: Pearl Villafana  MRN: [id number]  Admitting Diagnosis:  Sepsis       Recommendations:     Discharge Recommendations: nursing facility, skilled  Discharge Equipment Recommendations:   (defer to SNF)  Barriers to discharge:  Decreased caregiver support (current functional level)    Assessment:   Progressing towards goals as Pt. Is currently MIN A for functional transfers this day with boost needed during lift and consistent cuing for safe hand placement during transitions; Pt. Is also CGA for hand hygiene while in stance at sink with retrieval of soap needed and cues for safe RW proximity.  Continued recommendation of post acute OT in SNF.  To benefit from continued acute care OT services to increase independence in self-care/functional transfers.  Continue POC.     Pearl Villafana is a 81 y.o. female with a medical diagnosis of Sepsis.  She presents with below deficits decreasing independence in self-care/functional transfers. Performance deficits affecting function are weakness, impaired endurance, impaired self care skills, impaired functional mobilty, gait instability, impaired balance, decreased safety awareness, decreased lower extremity function, decreased upper extremity function, impaired skin.     Rehab Prognosis:  Good; patient would benefit from acute skilled OT services to address these deficits and reach maximum level of function.       Plan:     Patient to be seen 5 x/week to address the above listed problems via self-care/home management, therapeutic activities, therapeutic exercises  · Plan of Care Expires: 04/13/22  · Plan of Care Reviewed with: patient    Subjective     Pain/Comfort:  · Pain Rating 1: 0/10  · Pain Rating Post-Intervention 1: 0/10    Objective:     Communicated with: Shara CORTES LPN prior to session.  Patient found up in chair with peripheral IV, telemetry, mcdonald catheter upon OT entry to room.    General Precautions: Standard, fall   Orthopedic  Precautions:N/A   Braces: N/A  Respiratory Status: Room air     Occupational Performance:     Functional Mobility/Transfers:  · Sit>stand bedside chair>RW with MIN A for lift and ambulating short household distance with RW and CGA for steadying/safety with forward flexed trunk noted and verbal cuing for negotiating turns.  Step transfer BSC over toilet with MIN A for lift from BSC and MIN cuing with good follow through for safe hand placement.  Step transfer RW to bedside chair with CGA for steadying/safety, MIN verbal cuing for maintaining proximity to RW during steps to turn, and cues for safe hand placement.      Activities of Daily Living:  · Attempting to have BM while seated at BSC frame over toilet passing gas several times though without true BM as well as having stool markings on pad located on chair; able to manage gown prior to attempted toilet task though requiring assist with thorough back natividad hygiene while in stance with BUE supported at RW.  Able to wipe X 2 while seated at BSC using RUE.  Hand hygiene in stance requiring verbal cuing for safe RW positioning at sink and retrieval of soap during task.         [facility] 6 Click ADL: 15    Treatment & Education:  · Sit>stand bedside chair>RW with MIN A for lift and ambulating short household distance with RW and CGA for steadying/safety with forward flexed trunk noted and verbal cuing for negotiating turns.  Step transfer BSC over toilet with MIN A for lift from BSC and MIN cuing with good follow through for safe hand placement.  Step transfer RW to bedside chair with CGA for steadying/safety, MIN verbal cuing for maintaining proximity to RW during steps to turn, and cues for safe hand placement.    · Attempting to have BM while seated at BSC frame over toilet passing gas several times though without true BM as well as having stool markings on pad located on chair; able to manage gown prior to attempted toilet task though requiring assist with thorough back  natividad hygiene while in stance with BUE supported at RW.  Able to wipe X 2 while seated at [facility] using RUE.  Hand hygiene in stance requiring verbal cuing for safe RW positioning at sink and retrieval of soap during task.     · Received review of call light and discussed importance of calling for assist as needed as well as for OOB activity.     Patient left up in chair with all lines intact, call button in reach and nursing notifiedEducation:      GOALS:   Multidisciplinary Problems     Occupational Therapy Goals        Problem: Occupational Therapy Goal    Goal Priority Disciplines Outcome Interventions   Occupational Therapy Goal     OT, PT/OT Ongoing, Progressing    Description: Goals to be met by: 3/28/2022    Patient will increase functional independence with ADLs by performing:    UE Dressing with Minimal Assistance.  LE Dressing with Maximum Assistance.  Grooming while standing at sink with Stand-by Assistance.   Toileting from toilet with Minimal Assistance for hygiene and clothing management.   Step transfer with Stand-by Assistance.    Toilet transfer to toilet with Stand-by Assistance.      COMPLETED GOALS:  Step transfer with Minimal Assistance.  GOAL MET 3/23/2022.  Toilet transfer to toilet with Minimal Assistance.  GOAL MET 3/23/2022.  Grooming while standing at sink with Minimal Assistance.  GOAL MET 3/23/2022.                     Time Tracking:     OT Date of Treatment: 03/23/22  OT Start Time: 1326  OT Stop Time: 1351  OT Total Time (min): 25 min    Billable Minutes:Self Care/Home Management 12  Therapeutic Activity 13    OT/MIGUEL: OT          3/23/2022

## 2022-03-23 NOTE — PLAN OF CARE
Problem: Physical Therapy Goal  Goal: Physical Therapy Goal  Description: Goals to be met by: 2022    Patient will increase functional independence with mobility by performin. Pt will be able to transfer supine to sit with SPV.   2. Sit<>stand with SPV with RW.   3. Gait x 50 feet with CGA with RW.    MET Goals:  1. Pt will be able to transfer supine to sit with SBA MET 3/21/2022  2. Sit<>stand with CGA with RW. MET 3/21/2022       Outcome: Ongoing, Progressing       Pt tolerated treatment well with no adverse reactions. Pt is progressing toward meeting goals. Pt performed bed mobility, transfers, and in-room ambulation with RW. Demonstrated improved tolerance to seated therex. Pt continues to be limited by generalized weakness. PT will continue to follow and progress goals as tolerated. Recommend discharge to SNF.

## 2022-03-23 NOTE — ASSESSMENT & PLAN NOTE
- Improving, last BG at goal, only mild elevations over last 24h  - Not associated with long-term insulin use  - Complicated by hyperglycemia, BG still above goal on DM diet. Improves w/ SSI  - Home Meds:  Metformin 500 mg daily at home per chart.    A1c is 6.6  -Continue to hold Metformin at this time.    -Continue Low-dose correction sliding scale   - Continue detemir 5 units BID

## 2022-03-23 NOTE — PLAN OF CARE
NURSING HOME ORDERS    03/25/2022  [facility] ([facility])  [facility] ([facility])  [address]  [place] [address]  Dept: [phone]  Loc: [phone]     Admit to Nursing Home:  Skilled nursing    Diagnoses:  Active Hospital Problems    Diagnosis  POA    *Sepsis [A41.9]  Yes    Hypertension [I10]  Yes     Priority: 1 - High    Diabetes mellitus type II [E11.9]  Yes     Priority: 2      Dx updated per 2019 IMO Load      Decubitus ulcer of back, stage 1 [L89.101]  Yes    Altered mental status, unspecified [R41.82]  Yes    Acute renal failure superimposed on stage 3 chronic kidney disease [N17.9, N18.30]  Yes    ETOH abuse [F10.10]  Yes    Hyponatremia [E87.1]  Yes    Hyperlipidemia [E78.5]  Yes      Resolved Hospital Problems   No resolved problems to display.       Code Status: Full code    Patient is homebound due to:  Sepsis    Allergies:Review of patient's allergies indicates:  No Known Allergies    Vitals:  Every shift    Diet: diabetic diet: 2000 calorie and 2 gram sodium diet    Activities:   Activity as tolerated    Labs:  Per facility protocol    Nursing Precautions:  Fall and Pressure ulcer prevention    Consults:       PT/OT eval and treat    Miscellaneous Care: Zayas Care: Empty Zayas bag every shift.  Change Zayas every month  Diabetes Care: Diabetes: Check blood sugar. Fingerstick blood sugar AC and HS  Sliding Scale/Hypoglycemia Protocol: For FSG<80, give 1 amp D50 or 1 glucose tablet. For FSG , do nothing. For -200, give 1 unit of novolog in addition to pre-meal insulin. For -250, give 2 units of novolog in addition to pre-meal insulin. For -300, give 3 units of novolog in addition to pre-meal insulin. For 301-350, give 4 units of novolog in addition to pre-meal insulin. For 351-400, give 5 units of novolog in addition to pre-meal insulin. For FSG >400, give 5 units of novolog in addition to pre-meal insulin and please call  MD                   Diabetes Care:  SN to perform and educate Diabetic management with blood glucose monitoring:, Fingerstick blood sugar AC and HS and Report CBG < 60 or > 350 to physician.      Medications: Discontinue all previous medication orders, if any. See new list below.     Medication List      START taking these medications    amLODIPine 2.5 MG tablet  Commonly known as: NORVASC  Take 1 tablet (2.5 mg total) by mouth once daily.  Start taking on: March 24, 2022     carvediloL 25 MG tablet  Commonly known as: COREG  Take 1 tablet (25 mg total) by mouth 2 (two) times daily.     insulin aspart U-100 100 unit/mL (3 mL) Inpn pen  Commonly known as: NovoLOG  Inject 0-5 Units into the skin before meals and at bedtime as needed (Hyperglycemia per protocol above).     insulin detemir U-100 100 unit/mL (3 mL) Inpn pen  Commonly known as: Levemir FLEXTOUCH  Inject 5 Units into the skin 2 (two) times daily.        CHANGE how you take these medications    losartan 50 MG tablet  Commonly known as: COZAAR  Take 1 tablet (50 mg total) by mouth 2 (two) times a day.  What changed:   · medication strength  · how much to take  · when to take this        CONTINUE taking these medications    rosuvastatin 10 MG tablet  Commonly known as: CRESTOR  Take 10 mg by mouth nightly.        STOP taking these medications    allopurinoL 100 MG tablet  Commonly known as: ZYLOPRIM     busPIRone 5 MG Tab  Commonly known as: BUSPAR     FLUoxetine 20 MG capsule     lisinopriL 5 MG tablet  Commonly known as: PRINIVIL,ZESTRIL     metFORMIN 500 MG ER 24hr tablet  Commonly known as: GLUCOPHAGE-XR     metoprolol tartrate 25 MG tablet  Commonly known as: LOPRESSOR              Immunizations Administered as of 3/23/2022  Reviewed on 3/17/2022    No immunizations on file.          This patient has had both covid vaccinations    Some patients may experience side effects after vaccination.  These may include fever, headache, muscle or joint aches.   Most symptoms resolve with 24-48 hours and do not require urgent medical evaluation unless they persist for more than 72 hours or symptoms are concerning for an unrelated medical condition.          _________________________________  Kelechi Thomas MD  03/25/2022

## 2022-03-23 NOTE — PT/OT/SLP PROGRESS
Physical Therapy Treatment    Patient Name:  Pearl Villafana   MRN:  [id number]    Recommendations:     Discharge Recommendations:  nursing facility, skilled   Discharge Equipment Recommendations:  (TBD at next level of care)   Barriers to discharge: none to SNF    Assessment:     Pearl Villafana is a 81 y.o. female admitted with a medical diagnosis of Sepsis.  She presents with the following impairments/functional limitations:  impaired endurance, weakness, impaired self care skills, impaired functional mobilty, gait instability, impaired balance, decreased lower extremity function.    Pt tolerated treatment well with no adverse reactions. Pt is progressing toward meeting goals. Pt performed bed mobility, transfers, and in-room ambulation with RW. Demonstrated improved tolerance to seated therex. Pt continues to be limited by generalized weakness. PT will continue to follow and progress goals as tolerated. Recommend discharge to SNF.      Rehab Prognosis: Good; patient would benefit from acute skilled PT services to address these deficits and reach maximum level of function.    Recent Surgery: * No surgery found *      Plan:     During this hospitalization, patient to be seen 5 x/week to address the identified rehab impairments via gait training, therapeutic activities, therapeutic exercises, neuromuscular re-education and progress toward the following goals:    · Plan of Care Expires:  04/13/22    Subjective     Chief Complaint: loose bowels  Patient/Family Comments/goals: Pt interested in ambulating in room, however, limited at this time secondary to loose bowels.   Pain/Comfort:  · Pain Rating 1: 0/10  · Pain Rating Post-Intervention 1: 0/10      Objective:     Communicated with LO Olmedo prior to session.  Patient found HOB elevated with telemetry, peripheral IV, mcdonald catheter upon PT entry to room.     General Precautions: Standard, fall   Orthopedic Precautions:N/A   Braces: N/A  Respiratory Status: Room air      Functional Mobility:  · Bed Mobility:     · Supine to Sit: stand by assistance  · Transfers:     · Sit to Stand:  stand by assistance with rolling walker (from bed and from bedside commode)  · Gait: x2 trials of ~5 feet with rolling walker and stand by assistance. Pt with decreased speed, ashli, and bilateral step length. Decreased stability, however, no LOB noted. Pt with forward-flexed posture onto RW noted.     AM-PAC 6 CLICK MOBILITY  Turning over in bed (including adjusting bedclothes, sheets and blankets)?: 3  Sitting down on and standing up from a chair with arms (e.g., wheelchair, bedside commode, etc.): 3  Moving from lying on back to sitting on the side of the bed?: 3  Moving to and from a bed to a chair (including a wheelchair)?: 3  Need to walk in hospital room?: 3  Climbing 3-5 steps with a railing?: 2  Basic Mobility Total Score: 17     Therapeutic Activities and Exercises:  · Bed mobility, transfer, and gait training as described above.  · Pt performed the following therex seated EOB:  · Long arc quads (2x10 bilaterally)   · Hip flexion marches (2x10 bilaterally)   · Ankle pumps (2x10 bilaterally)    PT educated patient re:   · PT plan of care/role of PT  · Use of RW  · Therex  · Fall and safety precautions  · Gait deviations  · Use of call light (don't get up without assistance)  Pt verbalized understanding, however, needs reinforcement.     The patient is safe to transfer with RN assist  Patient encouraged to sit up in chair throughout the day to prevent deconditioning.     Patient left up in chair with all lines intact, call button in reach and RN notified about pt status and leaking catheter bag.    GOALS:   Multidisciplinary Problems     Physical Therapy Goals        Problem: Physical Therapy Goal    Goal Priority Disciplines Outcome Goal Variances Interventions   Physical Therapy Goal     PT, PT/OT Ongoing, Progressing     Description: Goals to be met by: 4/13/2022    Patient will increase  functional independence with mobility by performin. Pt will be able to transfer supine to sit with SPV.   2. Sit<>stand with SPV with RW.   3. Gait x 50 feet with CGA with RW.    MET Goals:  1. Pt will be able to transfer supine to sit with SBA MET 3/21/2022  2. Sit<>stand with CGA with RW. MET 3/21/2022                        Time Tracking:     PT Received On: 22  PT Start Time: 1040     PT Stop Time: 1106  PT Total Time (min): 26 min     Billable Minutes: Therapeutic Activity 16 and Therapeutic Exercise 10    Treatment Type: Treatment  PT/PTA: PT     PTA Visit Number: 0     2022

## 2022-03-23 NOTE — PLAN OF CARE
Problem: Occupational Therapy Goal  Goal: Occupational Therapy Goal  Description: Goals to be met by: 3/28/2022    Patient will increase functional independence with ADLs by performing:    UE Dressing with Minimal Assistance.  LE Dressing with Maximum Assistance.  Grooming while standing at sink with Stand-by Assistance.   Toileting from toilet with Minimal Assistance for hygiene and clothing management.   Step transfer with Stand-by Assistance.    Toilet transfer to toilet with Stand-by Assistance.      COMPLETED GOALS:  Step transfer with Minimal Assistance.  GOAL MET 3/23/2022.  Toilet transfer to toilet with Minimal Assistance.  GOAL MET 3/23/2022.  Grooming while standing at sink with Minimal Assistance.  GOAL MET 3/23/2022.    Outcome: Ongoing, Progressing     Progressing towards goals as Pt. Is currently MIN A for functional transfers this day with boost needed during lift and consistent cuing for safe hand placement during transitions; Pt. Is also CGA for hand hygiene while in stance at sink with retrieval of soap needed and cues for safe RW proximity.  Continued recommendation of post acute OT in SNF.  To benefit from continued acute care OT services to increase independence in self-care/functional transfers.  Continue POC.

## 2022-03-23 NOTE — ASSESSMENT & PLAN NOTE
- Improved, last BP normal  - Continue losartan 50 mg BID  - Continue coreg at 25 mg PO BID  - Continue hydralazine 25 mg PO Q6H PRN SBP > 170   - Continue amlodipine 5 mg daily  - Monitor and adjust as needed

## 2022-03-24 LAB
POCT GLUCOSE: 149 MG/DL (ref 70–110)
POCT GLUCOSE: 228 MG/DL (ref 70–110)
POCT GLUCOSE: 233 MG/DL (ref 70–110)
POCT GLUCOSE: 262 MG/DL (ref 70–110)

## 2022-03-24 PROCEDURE — 97530 THERAPEUTIC ACTIVITIES: CPT

## 2022-03-24 PROCEDURE — 25000003 PHARM REV CODE 250: Performed by: NURSE PRACTITIONER

## 2022-03-24 PROCEDURE — 99231 PR SUBSEQUENT HOSPITAL CARE,LEVL I: ICD-10-PCS | Mod: 95,,, | Performed by: STUDENT IN AN ORGANIZED HEALTH CARE EDUCATION/TRAINING PROGRAM

## 2022-03-24 PROCEDURE — 11000001 HC ACUTE MED/SURG PRIVATE ROOM

## 2022-03-24 PROCEDURE — 25000003 PHARM REV CODE 250: Performed by: PHYSICIAN ASSISTANT

## 2022-03-24 PROCEDURE — 25000003 PHARM REV CODE 250: Performed by: INTERNAL MEDICINE

## 2022-03-24 PROCEDURE — 97110 THERAPEUTIC EXERCISES: CPT | Mod: CQ

## 2022-03-24 PROCEDURE — 99231 SBSQ HOSP IP/OBS SF/LOW 25: CPT | Mod: 95,,, | Performed by: STUDENT IN AN ORGANIZED HEALTH CARE EDUCATION/TRAINING PROGRAM

## 2022-03-24 PROCEDURE — 97116 GAIT TRAINING THERAPY: CPT | Mod: CQ

## 2022-03-24 RX ADMIN — AMLODIPINE BESYLATE 2.5 MG: 2.5 TABLET ORAL at 08:03

## 2022-03-24 RX ADMIN — INSULIN ASPART 3 UNITS: 100 INJECTION, SOLUTION INTRAVENOUS; SUBCUTANEOUS at 12:03

## 2022-03-24 RX ADMIN — LOSARTAN POTASSIUM 50 MG: 50 TABLET, FILM COATED ORAL at 08:03

## 2022-03-24 RX ADMIN — CARVEDILOL 25 MG: 12.5 TABLET, FILM COATED ORAL at 08:03

## 2022-03-24 RX ADMIN — INSULIN ASPART 1 UNITS: 100 INJECTION, SOLUTION INTRAVENOUS; SUBCUTANEOUS at 08:03

## 2022-03-24 RX ADMIN — MELATONIN TAB 3 MG 6 MG: 3 TAB at 08:03

## 2022-03-24 RX ADMIN — ACETAMINOPHEN 650 MG: 325 TABLET, FILM COATED ORAL at 08:03

## 2022-03-24 RX ADMIN — LIDOCAINE 5% 1 PATCH: 700 PATCH TOPICAL at 04:03

## 2022-03-24 RX ADMIN — INSULIN ASPART 2 UNITS: 100 INJECTION, SOLUTION INTRAVENOUS; SUBCUTANEOUS at 05:03

## 2022-03-24 NOTE — PLAN OF CARE
Pt remained free of falls and injuries throughout shift. AAOx3-4, intermittently disoriented to time, reoriented as needed. Pt calm and cooperative. Purposeful hourly rounding performed. Pt swallows meds whole. IV flushed and saline locked. Zayas catheter in place to gravity. Patient ambulates with walker with standby assist to BSC. Dsg to sacrum with scant drainage, dry and intact. PRN Tylenol given for c/o neck pain. Weight shift assist provided q2h. VSS on room air. Pt resting comfortably in bed, NADN, denies needs at this time. Bed low and locked, bed alarm on, call light in reach. Side rails up x2. Will continue to monitor.

## 2022-03-24 NOTE — PLAN OF CARE
Problem: Physical Therapy Goal  Goal: Physical Therapy Goal  Description: Goals to be met by: 2022    Patient will increase functional independence with mobility by performin. Pt will be able to transfer supine to sit with SPV.   2. Sit<>stand with SPV with RW.   3. Gait x 50 feet with CGA with RW.-MET 3/24/22    MET Goals:  1. Pt will be able to transfer supine to sit with SBA MET 3/21/2022  2. Sit<>stand with CGA with RW. MET 3/21/2022       Outcome: Ongoing, Progressing   Pt sup to sit CGA, sit to stand CGA w/ RW, amb'd 60' w/ RW and CGA.

## 2022-03-24 NOTE — PROGRESS NOTES
[facility] Medicine  Telemedicine Progress Note    Patient Name: Pearl Villafana  MRN: [id number]  Patient Class: IP- Inpatient   Admission Date: 3/12/2022  Length of Stay: 12 days  Attending Physician: Kelechi Thomas MD  Primary Care Provider: Cleopatra Whaley MD          Subjective:     Principal Problem:Sepsis        HPI:  Calista Kang is an 81-year-old female with a past medical history of alcohol abuse, polypharmacy, colon cancer, breast cancer and hyponatremia along with diabetes hyper tension hyperlipidemia B12 deficiency and gout.  Her family contacted EMS to do a welfare check and they found her down on the ground with altered mental status in urine and feces.  Length of time down on known.  Patient is arousable to tactile touch in the bed.  She is able to tell her name her location and her date of birth.  She does follow simple commands.  Patient answers no to most questions.  She does name objects correctly.  Patient is hypothermic at 95.5° and tachycardic at 104 with moderate bacteriuria in her urinalysis. Code sepsis was called patient received a bolus of normal saline vancomycin and Zosyn.  No signs of respiratory distress.  Patient has some bruising to her right right lateral abdominal area.  No abdominal distension.  No tenderness or guarding to abdominal area.  Patient denies pain.  Patient reports she does not know why mg for how long.  She says that she remembers eating breakfast yesterday and going to some doctor appointments.  There is no family at bedside and her 's phone number is disconnected.  There are no other family members phone numbers in her chart.  Unable to confirm her history or her medications at this time.  Patient admitted to hospital medicine for further workup.      Overview/Hospital Course:  See below      Interval History:    Ms. Villafana was stable overnight with no new symptoms or complaints. She is awaiting placement. BP and BG both  improved, no changes today.    Review of Systems   Constitutional:  Positive for fatigue. Negative for chills and fever.   Respiratory:  Negative for cough and shortness of breath.    Cardiovascular:  Negative for chest pain and leg swelling.   Gastrointestinal:  Positive for diarrhea (improved). Negative for abdominal pain, constipation, nausea and vomiting.   Neurological:  Positive for weakness. Negative for numbness.   Objective:     Vital Signs (Most Recent):  Temp: 98.1 °F (36.7 °C) (03/24/22 1624)  Pulse: 75 (03/24/22 1624)  Resp: 16 (03/24/22 1624)  BP: 114/61 (03/24/22 1624)  SpO2: 96 % (03/24/22 1624)   Vital Signs (24h Range):  Temp:  [97.8 °F (36.6 °C)-98.4 °F (36.9 °C)] 98.1 °F (36.7 °C)  Pulse:  [68-80] 75  Resp:  [16-20] 16  SpO2:  [95 %-99 %] 96 %  BP: (112-146)/(54-80) 114/61     Weight: 68.5 kg (151 lb)  Body mass index is 24.37 kg/m².    Intake/Output Summary (Last 24 hours) at 3/24/2022 1715  Last data filed at 3/24/2022 1200  Gross per 24 hour   Intake 1350 ml   Output 200 ml   Net 1150 ml        Physical Exam  Constitutional:       General: She is not in acute distress.  HENT:      Head: Normocephalic and atraumatic.   Eyes:      General: No scleral icterus.        Right eye: No discharge.         Left eye: No discharge.   Pulmonary:      Effort: Pulmonary effort is normal.      Comments: Breathing comfortably  Abdominal:      General: There is no distension.   Musculoskeletal:      Comments: Moves all extremities well   Skin:     Findings: No erythema or rash.   Neurological:      General: No focal deficit present.      Mental Status: She is alert and oriented to person, place, and time.      Comments: Alert, conversant   Psychiatric:         Mood and Affect: Mood normal.         Behavior: Behavior normal.       Significant Labs: All pertinent labs within the past 24 hours have been reviewed.    Significant Imaging: I have reviewed all pertinent imaging results/findings within the past 24  hours.      Assessment/Plan:      * Sepsis  Patient presented after family contacted EMS to do a welfare check - they found her down on the ground with altered mental status in urine and feces.  Length of time down on known.  Patient was hypothermic at 95.5° and tachycardic at 104 with moderate bacteriuria in her urinalysis.  Code sepsis was called in ED and patient received a bolus of normal saline, as well as IV Vancomycin and Zosyn. Chest x-ray was negative for any consolidation or pleural effusions.  Concern for UTI due to moderate bacteriuria on analysis.  WBC was elevated at 14.77 with left shift, Procalcitonin of 0.34, normal lactic acid, normal ammonia, negative ETOH and Tox screen.  Blood cultures growing 1/4 bottles with Coag-negative Staph (likely contaminant)  and Urine culture was negative. Antibiotics were d/c on 3/14/2022.  Failed Voiding Trial yesterday and Zayas replaced overnight.  Patient is awake and oriented to person, place, month, year, but not to President.  Can do serial 7s to 14.  Awaiting SNF placement.    Plan:  Follow off antibiotics for now - discontinued pharmacy consult order  PT consulted - recommend SNF - patient agrees  Continue Zayas with repeat Voiding Trial while at SNF    Decubitus ulcer of back, stage 1  -Continue with wound care      ETOH abuse  Elevated beta hydroxybutyrate.  Ethanol less than 10. Last drink on known.  Patient with history of beer potamania.  Stable without need for benzodiazepine treatment.  -Monitor CIWA scale every 4 hours.      Acute renal failure superimposed on stage 3 chronic kidney disease    - Resolved      Altered mental status, unspecified    - Resolved      Hyponatremia    - Resolved      Colon cancer  History of colon cancer and breast cancer.  Occult stool neg.  H&H stable.  Will monitor.      Diabetes mellitus type II    - Improving, last BG at goal, only mild elevations over last 24h  - Not associated with long-term insulin use  - Complicated  by hyperglycemia, BG still above goal on DM diet. Improves w/ SSI  - Home Meds:  Metformin 500 mg daily at home per chart.    A1c is 6.6  -Continue to hold Metformin at this time.    -Continue Low-dose correction sliding scale   - Continue detemir 5 units BID      Hyperlipidemia  On Crestor as outpatient - held this time due to elevation of liver enzymes.      Hypertension    - Improved, last BP normal  - Continue losartan 50 mg BID  - Continue coreg at 25 mg PO BID  - Continue hydralazine 25 mg PO Q6H PRN SBP > 170   - Continue amlodipine 5 mg daily  - Monitor and adjust as needed      VTE Risk Mitigation (From admission, onward)         Ordered     Reason for No Pharmacological VTE Prophylaxis  Once        Question:  Reasons:  Answer:  Active Bleeding    03/12/22 2041     IP VTE HIGH RISK PATIENT  Once         03/12/22 2041     Place sequential compression device  Until discontinued         03/12/22 2041                      I have assessed these finding virtually using telemed platform and with assistance of bedside nurse                 The attending portion of this evaluation, treatment, and documentation was performed per Kelechi Thomas MD via Telemedicine AudioVisual using the secure [employer] software platform with 2 way audio/video. The provider was located off-site and the patient is located in the hospital. The aforementioned video software was utilized to document the relevant history and physical exam    Kelechi Thomas MD  Department of Hospital Medicine   [facility] ([place])

## 2022-03-24 NOTE — SUBJECTIVE & OBJECTIVE
Interval History:    Ms. Villafana was stable overnight with no new symptoms or complaints. She is awaiting placement. BP and BG both improved, no changes today.    Review of Systems   Constitutional:  Positive for fatigue. Negative for chills and fever.   Respiratory:  Negative for cough and shortness of breath.    Cardiovascular:  Negative for chest pain and leg swelling.   Gastrointestinal:  Positive for diarrhea (improved). Negative for abdominal pain, constipation, nausea and vomiting.   Neurological:  Positive for weakness. Negative for numbness.   Objective:     Vital Signs (Most Recent):  Temp: 98.1 °F (36.7 °C) (03/24/22 1624)  Pulse: 75 (03/24/22 1624)  Resp: 16 (03/24/22 1624)  BP: 114/61 (03/24/22 1624)  SpO2: 96 % (03/24/22 1624)   Vital Signs (24h Range):  Temp:  [97.8 °F (36.6 °C)-98.4 °F (36.9 °C)] 98.1 °F (36.7 °C)  Pulse:  [68-80] 75  Resp:  [16-20] 16  SpO2:  [95 %-99 %] 96 %  BP: (112-146)/(54-80) 114/61     Weight: 68.5 kg (151 lb)  Body mass index is 24.37 kg/m².    Intake/Output Summary (Last 24 hours) at 3/24/2022 1715  Last data filed at 3/24/2022 1200  Gross per 24 hour   Intake 1350 ml   Output 200 ml   Net 1150 ml        Physical Exam  Constitutional:       General: She is not in acute distress.  HENT:      Head: Normocephalic and atraumatic.   Eyes:      General: No scleral icterus.        Right eye: No discharge.         Left eye: No discharge.   Pulmonary:      Effort: Pulmonary effort is normal.      Comments: Breathing comfortably  Abdominal:      General: There is no distension.   Musculoskeletal:      Comments: Moves all extremities well   Skin:     Findings: No erythema or rash.   Neurological:      General: No focal deficit present.      Mental Status: She is alert and oriented to person, place, and time.      Comments: Alert, conversant   Psychiatric:         Mood and Affect: Mood normal.         Behavior: Behavior normal.       Significant Labs: All pertinent labs within the  past 24 hours have been reviewed.    Significant Imaging: I have reviewed all pertinent imaging results/findings within the past 24 hours.

## 2022-03-24 NOTE — PLAN OF CARE
POC reviewed w/ pt. AAOx4. No significant events this shift. VSS on RA. C/o pain treated w/ PRN pain medications. Turn Q2/frequent weight shift assistance provided. Instructed to call for help ambulating. No injuries, falls, or trauma occurred during shift. Purposeful rounding completed. Bed alarm set, bed low and locked, side rails up x3, call light within reach.

## 2022-03-24 NOTE — PLAN OF CARE
POC reviewed w/ pt. No significant events this shift. VSS on RA. No complaints of pain.Turn Q2/frequent weight shift assistance provided. Instructed to call for help ambulating. No injuries, falls, or trauma occurred during shift. Purposeful rounding completed. Bed alarm set, bed low and locked, side rails up x3, call light within reach.

## 2022-03-24 NOTE — PROGRESS NOTES
IP Liaison - Initial Visit Note    Patient: Pearl Villafana  MRN:  [id number]  Date of Service:  3/24/2022  Completed by:  MARCELLA Dang    Reason for Visit   Patient presents with    IP Liaison Initial Visit       RSW called patient over the phone in order to complete SDOH questionnaire and liaison assessment.  Pt has identified no barriers to care.  Patient stated she is going to skilled nursing.  The following were addressed during this visit:  - Review SDOH Questions   - Complete patient assessment   - Complete initial visit with patient        Patient Summary     IP Liaison Patient Assessment    General  Level of Caregiver support: Caregiver currently provides assistance  Have you had to make a decision between paying for any of the following in the last 2 months?: None  Transportation means: Family  Employment status: Retired and not working  Do you have any of the following?: Medical power of   Current symptoms: Sleep disturbances  Assessments  Was the PHQ Depression Screening completed this visit?: Yes  Was the PRASAD-7 Screening completed this visit?: No         MARCELLA Dang

## 2022-03-24 NOTE — PLAN OF CARE
CM has spoken to Denise at [facility] to inquire about acceptance. Denise states her  needs to speak to pt's daughter regarding additional financial records.  CM attempting to call daughter, but phone has been busy for several attempts.    CM to continue to follow for plans and arrangements.

## 2022-03-24 NOTE — PT/OT/SLP PROGRESS
Physical Therapy Treatment    Patient Name:  Pearl Villaafna   MRN:  [id number]    Recommendations:     Discharge Recommendations:  nursing facility, skilled   Discharge Equipment Recommendations:  (TBD at next level of care)   Barriers to discharge: Decreased caregiver support    Assessment:     Pearl Villafana is a 81 y.o. female admitted with a medical diagnosis of Sepsis.  She presents with the following impairments/functional limitations:  weakness, impaired endurance, impaired self care skills, impaired functional mobilty, gait instability, impaired balance, decreased safety awareness, decreased lower extremity function, impaired skin ;pt with good mobility today, inc amb distance in hallway.    Rehab Prognosis: Good; patient would benefit from acute skilled PT services to address these deficits and reach maximum level of function.    Recent Surgery: * No surgery found *      Plan:     During this hospitalization, patient to be seen 5 x/week to address the identified rehab impairments via gait training, therapeutic activities, therapeutic exercises, neuromuscular re-education and progress toward the following goals:    · Plan of Care Expires:  04/13/22    Subjective     Chief Complaint: none stated  Patient/Family Comments/goals: pt agreeable to session, reported feeling better after walking.  Pain/Comfort:  · Pain Rating 1: 0/10  · Pain Rating Post-Intervention 1: 0/10      Objective:     Communicated with nurse prior to session.  Patient found HOB elevated with peripheral IV, mcdonald catheter, bed alarm upon PT entry to room.     General Precautions: Standard, fall   Orthopedic Precautions:N/A   Braces: N/A  Respiratory Status: Room air     Functional Mobility:  · Bed Mobility:     · Supine to Sit:contact guard assistance  · Transfers:     · Sit to Stand:  contact guard assistance  with rolling walker  · Gait: amb'd ~60' w/ RW and CGA; cueing for posture      AM-PAC 6 CLICK MOBILITY  Turning over in bed  (including adjusting bedclothes, sheets and blankets)?: 3  Sitting down on and standing up from a chair with arms (e.g., wheelchair, bedside commode, etc.): 3  Moving from lying on back to sitting on the side of the bed?: 3  Moving to and from a bed to a chair (including a wheelchair)?: 3  Need to walk in hospital room?: 3  Climbing 3-5 steps with a railing?: 3  Basic Mobility Total Score: 18       Therapeutic Activities and Exercises:   pt perf'd seated LE ex's of AP's, hip flex, LAQ's x 10 ea.     Patient left up in chair with all lines intact, call button in reach and nurse notified..    GOALS:   Multidisciplinary Problems     Physical Therapy Goals        Problem: Physical Therapy Goal    Goal Priority Disciplines Outcome Goal Variances Interventions   Physical Therapy Goal     PT, PT/OT Ongoing, Progressing     Description: Goals to be met by: 2022    Patient will increase functional independence with mobility by performin. Pt will be able to transfer supine to sit with SPV.   2. Sit<>stand with SPV with RW.   3. Gait x 50 feet with CGA with RW.    MET Goals:  1. Pt will be able to transfer supine to sit with SBA MET 3/21/2022  2. Sit<>stand with CGA with RW. MET 3/21/2022                        Time Tracking:     PT Received On: 22  PT Start Time: 1045     PT Stop Time: 1109  PT Total Time (min): 24 min     Billable Minutes: Gait Training 14 and Therapeutic Exercise 10    Treatment Type: Treatment  PT/PTA: PTA     PTA Visit Number: 1     2022

## 2022-03-24 NOTE — PT/OT/SLP PROGRESS
Occupational Therapy   Treatment    Name: Pearl Villafana  MRN: [id number]  Admitting Diagnosis:  Sepsis       Recommendations:     Discharge Recommendations: nursing facility, skilled  Discharge Equipment Recommendations:   (TBD at next level of care)  Barriers to discharge:  Decreased caregiver support (current functional level)    Assessment:     Pearl Villafana is a 81 y.o. female with a medical diagnosis of Sepsis.  She presents with no pain. Performance deficits affecting function are weakness, impaired endurance, impaired self care skills, impaired functional mobilty, gait instability, impaired balance, decreased safety awareness, impaired skin, decreased lower extremity function, decreased upper extremity function.  Pt agreeable to participating in therapy upon arrival to room.  Pt demonstrated improvement with grooming performing with CGA, RW standing at sink.  While ambulating in room with CGA, RW no instances of LOB noted.  Mild decreased safety awareness observed.    Overall, pt tolerated therapy well with no signs of distress.  She is making progress towards goals, and would continue to benefit from skilled OT services to address problems listed above and increase independence with ADLs.  SNF is recommended upon d/c from acute care to further address deficits and help pt improve overall functional independence.          Rehab Prognosis:  Good; patient would benefit from acute skilled OT services to address these deficits and reach maximum level of function.       Plan:     Patient to be seen 5 x/week to address the above listed problems via self-care/home management, therapeutic activities, therapeutic exercises  · Plan of Care Expires: 04/13/22  · Plan of Care Reviewed with: patient    Subjective     Pain/Comfort:  · Pain Rating 1: 0/10  · Pain Rating Post-Intervention 1: 0/10    Objective:     Communicated with: RN Ivy) prior to session.  Patient found up in chair with peripheral IV, mcdonald catheter  upon OT entry to room.  *Lunch arrived minutes after start of session.  Pt agreeable to therapy but reported that she was ready to eat so session completed after a few exercises.    General Precautions: Standard, fall   Orthopedic Precautions:N/A   Braces: N/A  Respiratory Status: Room air     Occupational Performance:     Bed Mobility:    · Not assessed 2* pt up in chair upon arrival.    Functional Mobility/Transfers:  · Sit <> Stand: Min A, RW x 1 trial from chair  · Cues provided for technique and position  · Functional Mobility: Pt ambulated ~20 ft in room with CGA, RW.  · No instances of LOB noted.    Activities of Daily Living:  · Grooming: CGA, RW for washing hands while standing at sink.  · Toileting: Total A, RW for performing hygiene care in standing position.    · Bowel movement smear noted on pad after standing.  · Pt able to maintain standing balance with SBA, RW      [facility] 6 Click ADL: 16    Treatment & Education:  *Pt educated on role of OT in acute care setting.  *Pt ambulated within room to address coordination, endurance, and balance needed for functional mobility.  *Pt performed grooming task standing at sink; cues provided for technique.  *Pt performed UB exercises to provide stretch and promote increased endurance needed for ADLs: 2 sets x 10 reps per exercise, seated up in chair  -Overhead claps  -Forward circular rows  -Arm bike  *POC reviewed with pt        Patient left up in chair with all lines intact, call button in reach and RN (Shara) notifiedEducation:    *lunch tray set up; pt notified not to eat until RN checks blood sugar. RN made aware lunch was present.    GOALS:   Multidisciplinary Problems     Occupational Therapy Goals        Problem: Occupational Therapy Goal    Goal Priority Disciplines Outcome Interventions   Occupational Therapy Goal     OT, PT/OT Ongoing, Progressing    Description: Goals to be met by: 3/28/2022    Patient will increase functional independence with ADLs by  performing:    UE Dressing with Minimal Assistance.  LE Dressing with Maximum Assistance.  Grooming while standing at sink with Stand-by Assistance.   Toileting from toilet with Minimal Assistance for hygiene and clothing management.   Step transfer with Stand-by Assistance.    Toilet transfer to toilet with Stand-by Assistance.      COMPLETED GOALS:  Step transfer with Minimal Assistance.  GOAL MET 3/23/2022.  Toilet transfer to toilet with Minimal Assistance.  GOAL MET 3/23/2022.  Grooming while standing at sink with Minimal Assistance.  GOAL MET 3/23/2022.                     Time Tracking:     OT Date of Treatment: 03/24/22  OT Start Time: 1222  OT Stop Time: 1236  OT Total Time (min): 14 min    Billable Minutes:Therapeutic Activity 14    OT/MIGUEL: OT          3/24/2022

## 2022-03-24 NOTE — PLAN OF CARE
Cm spoke to Denise at [facility]/ [place], Denise states she has to talk to her financial representation, attempting to get additional financial records.    CM to follow for plans and arrangemetns.

## 2022-03-25 LAB
POCT GLUCOSE: 143 MG/DL (ref 70–110)
POCT GLUCOSE: 241 MG/DL (ref 70–110)
POCT GLUCOSE: 254 MG/DL (ref 70–110)
POCT GLUCOSE: 281 MG/DL (ref 70–110)

## 2022-03-25 PROCEDURE — 97530 THERAPEUTIC ACTIVITIES: CPT

## 2022-03-25 PROCEDURE — 99231 SBSQ HOSP IP/OBS SF/LOW 25: CPT | Mod: 95,,, | Performed by: STUDENT IN AN ORGANIZED HEALTH CARE EDUCATION/TRAINING PROGRAM

## 2022-03-25 PROCEDURE — 97116 GAIT TRAINING THERAPY: CPT

## 2022-03-25 PROCEDURE — 97535 SELF CARE MNGMENT TRAINING: CPT

## 2022-03-25 PROCEDURE — 51702 INSERT TEMP BLADDER CATH: CPT

## 2022-03-25 PROCEDURE — 99231 PR SUBSEQUENT HOSPITAL CARE,LEVL I: ICD-10-PCS | Mod: 95,,, | Performed by: STUDENT IN AN ORGANIZED HEALTH CARE EDUCATION/TRAINING PROGRAM

## 2022-03-25 PROCEDURE — 25000003 PHARM REV CODE 250: Performed by: INTERNAL MEDICINE

## 2022-03-25 PROCEDURE — 11000001 HC ACUTE MED/SURG PRIVATE ROOM

## 2022-03-25 RX ADMIN — LIDOCAINE 5% 1 PATCH: 700 PATCH TOPICAL at 04:03

## 2022-03-25 RX ADMIN — CARVEDILOL 25 MG: 12.5 TABLET, FILM COATED ORAL at 09:03

## 2022-03-25 RX ADMIN — AMLODIPINE BESYLATE 2.5 MG: 2.5 TABLET ORAL at 09:03

## 2022-03-25 RX ADMIN — INSULIN ASPART 1 UNITS: 100 INJECTION, SOLUTION INTRAVENOUS; SUBCUTANEOUS at 09:03

## 2022-03-25 RX ADMIN — LOSARTAN POTASSIUM 50 MG: 50 TABLET, FILM COATED ORAL at 09:03

## 2022-03-25 NOTE — PT/OT/SLP PROGRESS
Physical Therapy Treatment    Patient Name:  Pearl Villafana   MRN:  [id number]    Recommendations:     Discharge Recommendations:  nursing facility, skilled   Discharge Equipment Recommendations: other (see comments) (TBD @ next level of care)   Barriers to discharge: Decreased caregiver support and increased mob    Assessment:     Pearl Villafana is a 81 y.o. female admitted with a medical diagnosis of Sepsis.  She presents with the following impairments/functional limitations:  weakness, impaired endurance, impaired self care skills, impaired functional mobilty, gait instability, impaired balance, decreased lower extremity function, decreased safety awareness, decreased coordination . Upon arrival, pt was HOB on room air. Pt was Nasreen sit<>stand w/ RW, Nasreen amb 40ft w/ RW, decreased step length, performs step to gait pattern, postural awareness, needs min-mod vcs, increased fatigue. Pt has difficulty avoiding obstacles, appears to have delayed reaction timing during amb.    Rehab Prognosis: Good; patient would benefit from acute skilled PT services to address these deficits and reach maximum level of function.    Recent Surgery: * No surgery found *      Plan:     During this hospitalization, patient to be seen 5 x/week to address the identified rehab impairments via gait training, therapeutic activities, therapeutic exercises, neuromuscular re-education and progress toward the following goals:    · Plan of Care Expires:  04/13/22    Subjective     Chief Complaint: I'm tired I need to go back to my chair.  Patient/Family Comments/goals: None stated  Pain/Comfort:  · Pain Rating 1: 0/10      Objective:     Communicated with Kendra SOFIA prior to session.  Patient found up in chair with peripheral IV, mcdonald catheter upon PT entry to room.     General Precautions: Standard, fall   Orthopedic Precautions:N/A   Braces: N/A  Respiratory Status: Room air     Functional Mobility:  · Transfers:     · Sit to Stand:  minimum  assistance with rolling walker  Gait: Agnieszka amb 40ft w/ RW, decreased step length, performs step to gait pattern, postural awareness, needs min-mod vcs, increased fatigue. Has difficulty avoiding obstacles, appears to have delayed reaction timing during amb.      AM-PAC 6 CLICK MOBILITY  Turning over in bed (including adjusting bedclothes, sheets and blankets)?: 3  Sitting down on and standing up from a chair with arms (e.g., wheelchair, bedside commode, etc.): 3  Moving from lying on back to sitting on the side of the bed?: 3  Moving to and from a bed to a chair (including a wheelchair)?: 3  Need to walk in hospital room?: 3  Climbing 3-5 steps with a railing?: 2  Basic Mobility Total Score: 17       Therapeutic Activities and Exercises:   Importance of mob, safety awareness, gait training for increased functional mob and increased endurance, postural awareness    Patient left up in chair with all lines intact, call button in reach and LPN notified..    GOALS:   Multidisciplinary Problems     Physical Therapy Goals        Problem: Physical Therapy Goal    Goal Priority Disciplines Outcome Goal Variances Interventions   Physical Therapy Goal     PT, PT/OT Ongoing, Progressing     Description: Goals to be met by: 2022    Patient will increase functional independence with mobility by performin. Pt will be able to transfer supine to sit with SPV.   2. Sit<>stand with SPV with RW.   3. Gait x 100 feet with CGA with RW, avoiding obstacles.    MET Goals:  1. Pt will be able to transfer supine to sit with SBA MET 3/21/2022  2. Sit<>stand with CGA with RW. MET 3/21/2022                        Time Tracking:     PT Received On: 22  PT Start Time: 1138     PT Stop Time: 1155  PT Total Time (min): 17 min     Billable Minutes: Gait Training 17    Treatment Type: Treatment  PT/PTA: PT     PTA Visit Number: 0     2022

## 2022-03-25 NOTE — PROGRESS NOTES
03/25/22 1300        Altered Skin Integrity 03/12/22 2115 Right posterior Heel #1 Other (comment) Purple or maroon localized area of discolored intact skin or non-intact skin or a blood-filled blister.   Date First Assessed/Time First Assessed: 03/12/22 2115   Altered Skin Integrity Present on Admission: yes  Side: Right  Orientation: posterior  Location: Heel  Wound Number: #1  Is this injury device related?: No  Primary Wound Type: (c) Other (commen...   Wound Image    Dressing Appearance other (see comments)  (sacrum heel protectors)   Drainage Amount None   Drainage Characteristics/Odor No odor   Appearance Intact;Dry   Dressing Change Due 03/29/22        Altered Skin Integrity 03/12/22 2115 Right upper other (see comments) #2 Other (comment)   Date First Assessed/Time First Assessed: 03/12/22 2115   Altered Skin Integrity Present on Admission: yes  Side: Right  Orientation: upper  Location: (c) other (see comments)  Wound Number: #2  Is this injury device related?: No  Primary Wound Type: (...   Dressing Appearance Intact   Drainage Amount None   Drainage Characteristics/Odor No odor   Appearance Intact        Altered Skin Integrity 03/12/22 2115 midline Chin Other (comment)   Date First Assessed/Time First Assessed: 03/12/22 2115   Altered Skin Integrity Present on Admission: yes  Orientation: midline  Location: Chin  Is this injury device related?: No  Primary Wound Type: (c) Other (comment)   Wound Image    Dressing Appearance Intact   Drainage Amount None   Drainage Characteristics/Odor No odor   Periwound Area Intact   Wound Edges Undefined        Altered Skin Integrity 03/14/22 1530 Left anterior Foot Intact skin with non-blanchable redness of localized area   Date First Assessed/Time First Assessed: 03/14/22 1530   Altered Skin Integrity Present on Admission: yes  Side: Left  Orientation: anterior  Location: Foot  Is this injury device related?: No  Description of Altered Skin Integrity: Intact skin  with n...   Dressing Appearance Intact   Drainage Amount None   Drainage Characteristics/Odor No odor   Appearance Intact   Dressing Change Due 03/29/22

## 2022-03-25 NOTE — PLAN OF CARE
Problem: Infection  Goal: Absence of Infection Signs and Symptoms  Outcome: Ongoing, Progressing     Problem: Adult Inpatient Plan of Care  Goal: Plan of Care Review  Outcome: Ongoing, Progressing  Goal: Patient-Specific Goal (Individualized)  Outcome: Ongoing, Progressing  Goal: Absence of Hospital-Acquired Illness or Injury  Outcome: Ongoing, Progressing  Goal: Optimal Comfort and Wellbeing  Outcome: Ongoing, Progressing  Goal: Readiness for Transition of Care  Outcome: Ongoing, Progressing     Problem: Diabetes Comorbidity  Goal: Blood Glucose Level Within Targeted Range  Outcome: Ongoing, Progressing     Problem: Adjustment to Illness (Sepsis/Septic Shock)  Goal: Optimal Coping  Outcome: Ongoing, Progressing     Problem: Bleeding (Sepsis/Septic Shock)  Goal: Absence of Bleeding  Outcome: Ongoing, Progressing     Problem: Glycemic Control Impaired (Sepsis/Septic Shock)  Goal: Blood Glucose Level Within Desired Range  Outcome: Ongoing, Progressing     Problem: Infection Progression (Sepsis/Septic Shock)  Goal: Absence of Infection Signs and Symptoms  Outcome: Ongoing, Progressing     Problem: Nutrition Impaired (Sepsis/Septic Shock)  Goal: Optimal Nutrition Intake  Outcome: Ongoing, Progressing     Problem: Fluid and Electrolyte Imbalance (Acute Kidney Injury/Impairment)  Goal: Fluid and Electrolyte Balance  Outcome: Ongoing, Progressing     Problem: Oral Intake Inadequate (Acute Kidney Injury/Impairment)  Goal: Optimal Nutrition Intake  Outcome: Ongoing, Progressing     Problem: Renal Function Impairment (Acute Kidney Injury/Impairment)  Goal: Effective Renal Function  Outcome: Ongoing, Progressing     Problem: Impaired Wound Healing  Goal: Optimal Wound Healing  Outcome: Ongoing, Progressing     Problem: Skin Injury Risk Increased  Goal: Skin Health and Integrity  Outcome: Ongoing, Progressing

## 2022-03-25 NOTE — PROGRESS NOTES
Wound care treatment completed on am shift . Patient tolerated well   03/25/22 1131        Altered Skin Integrity 03/14/22 1530 medial Sacral spine Purple or maroon localized area of discolored intact skin or non-intact skin or a blood-filled blister.   Date First Assessed/Time First Assessed: 03/14/22 1530   Altered Skin Integrity Present on Admission: yes  Orientation: medial  Location: Sacral spine  Is this injury device related?: No  Description of Altered Skin Integrity: Purple or maroon localiz...   Wound Image    Dressing Appearance No dressing   Drainage Amount Small   Drainage Characteristics/Odor No odor;Bleeding controlled   Appearance Pink;Red;Bleeding   Periwound Area Moist   Wound Edges Undefined   Care Cleansed with:;Wound cleanser   Dressing Applied;Hydrofiber;Foam;Silicone   Dressing Change Due 03/25/22   Safety Management   Safety Promotion/Fall Prevention side rails raised x 2   Patient Rounds bed in low position;bed wheels locked;call light in patient/parent reach

## 2022-03-25 NOTE — SUBJECTIVE & OBJECTIVE
Interval History:    Ms. Villafana was stable overnight with no new symptoms or complaints. She is awaiting placement. Unable to reach daughter for paperwork needed.     Review of Systems   Constitutional:  Positive for fatigue. Negative for chills and fever.   Respiratory:  Negative for cough and shortness of breath.    Cardiovascular:  Negative for chest pain and leg swelling.   Gastrointestinal:  Positive for diarrhea (improved). Negative for abdominal pain, constipation, nausea and vomiting.   Neurological:  Positive for weakness. Negative for numbness.   Objective:     Vital Signs (Most Recent):  Temp: 98.5 °F (36.9 °C) (03/25/22 1131)  Pulse: 76 (03/25/22 1131)  Resp: 18 (03/25/22 1131)  BP: (!) 103/52 (03/25/22 1131)  SpO2: 98 % (03/25/22 1131)   Vital Signs (24h Range):  Temp:  [97 °F (36.1 °C)-98.9 °F (37.2 °C)] 98.5 °F (36.9 °C)  Pulse:  [68-77] 76  Resp:  [16-20] 18  SpO2:  [96 %-100 %] 98 %  BP: (103-170)/(52-73) 103/52     Weight: 68.5 kg (151 lb)  Body mass index is 24.37 kg/m².    Intake/Output Summary (Last 24 hours) at 3/25/2022 1258  Last data filed at 3/25/2022 0600  Gross per 24 hour   Intake --   Output 1000 ml   Net -1000 ml        Physical Exam  Constitutional:       General: She is not in acute distress.  HENT:      Head: Normocephalic and atraumatic.   Eyes:      General: No scleral icterus.        Right eye: No discharge.         Left eye: No discharge.   Pulmonary:      Effort: Pulmonary effort is normal.      Comments: Breathing comfortably  Abdominal:      General: There is no distension.   Musculoskeletal:      Comments: Moves all extremities well   Skin:     Findings: No erythema or rash.   Neurological:      General: No focal deficit present.      Mental Status: She is alert and oriented to person, place, and time.      Comments: Alert, conversant   Psychiatric:         Mood and Affect: Mood normal.         Behavior: Behavior normal.       Significant Labs: All pertinent labs within  the past 24 hours have been reviewed.    Significant Imaging: I have reviewed all pertinent imaging results/findings within the past 24 hours.

## 2022-03-25 NOTE — PT/OT/SLP PROGRESS
Occupational Therapy   Treatment    Name: Pearl Villafana  MRN: [id number]  Admitting Diagnosis:  Sepsis       Recommendations:     Discharge Recommendations: nursing facility, skilled  Discharge Equipment Recommendations:   (TBD at next level of care)  Barriers to discharge:  Decreased caregiver support ([facility] functional level)    Assessment:     Pearl Villafana is a 81 y.o. female with a medical diagnosis of Sepsis.  She presents with no pain. Performance deficits affecting function are weakness, impaired endurance, impaired self care skills, impaired functional mobilty, gait instability, impaired balance, decreased lower extremity function, decreased safety awareness, decreased coordination, impaired cognition.  Pt agreeable to participating in therapy upon arrival to room.  While ambulating household distance in room with CGA, RW decreased safety awareness and delayed processing noted.  In addition, decreased safety awareness observed during toilet transfer to [facility] placed over toilet requiring CGA, RW and cues.  Pt able to follow all two step commands during session; however, increased time and repetition required for processing.  OT introduced one exercise to address coordination and postural stability in sitting, but pt unable to understand requiring change in structure.      Overall, pt tolerated therapy well and is making progress towards goals.  She is not at PLOF and would continue to benefit from skilled OT services to address problems listed above and increase independence with ADLs.  SNF is recommended upon d/c from acute care to further address deficits and help pt improve overall functional independence.       Rehab Prognosis:  Fair; patient would benefit from acute skilled OT services to address these deficits and reach maximum level of function.       Plan:     Patient to be seen 5 x/week to address the above listed problems via self-care/home management, therapeutic activities, therapeutic  exercises  · Plan of Care Expires: 04/13/22  · Plan of Care Reviewed with: patient    Subjective     *OT asked about pt's home environment.  Pt reported that she and her  no longer live together.  She stated daughter stays with her, but is at work during the day.    Pain/Comfort:  · Pain Rating 1: 0/10  · Pain Rating Post-Intervention 1: 0/10    Objective:     Communicated with: RN (Matt) prior to session.  Patient found up in chair with peripheral IV, mcdonald catheter upon OT entry to room.  RN present in room at start of session.    General Precautions: Standard, fall   Orthopedic Precautions:N/A   Braces: N/A  Respiratory Status: Room air     Occupational Performance:     Bed Mobility:    · Not assessed 2* pt up in chair upon arrival.    Functional Mobility/Transfers:  · Sit <> Stand:   · CGA, RW x 1 trial from chair  · CGA, RW x 1 trial from BSC placed over toilet  · Cues required for technique  · Toilet:  CGA, RW x 1 trial taking steps to BSC placed over toilet  · Decreased safety awareness noted  · Cues required for technique and positioning  · Functional Mobility: Pt ambulated ~25 ft in room with CGA, RW.  · Decreased safety awareness and delayed processing noted requiring cues    Activities of Daily Living:  · Lower Body Dressing: SBA for partially doffing/donning sock while seated up in chair.  · Increased time required  · Pt pulled sock off over heel, but declined taking sock completely off of toes before putting it back on.      [facility] 6 Click ADL: 17    Treatment & Education:  *Pt educated on role of OT in acute care setting.  *Pt ambulated within room to address coordination, endurance, and balance needed for functional mobility   *Pt performed toilet transfer to BSC placed over toilet; cues required for technique and positioning  *Pt performed UB exercises to promote increased endurance, ROM, and postural stability needed for ADLs: Seated in chair  -Seated marches touching right hand to elevated  right knee, and left hand to elevated left knee: 2 sets x 20 reps  -Overhead claps: 2 sets x 15 reps  -Forward circular rows: 2 sets x 15 reps  -Shoulder abduction/adduction: 2 sets x 15 reps  -Shoulder flexion: 2 sets x 15 reps  *POC reviewed with pt      Patient left up in chair with call button in reachEducation:  , all lines intact  GOALS:   Multidisciplinary Problems     Occupational Therapy Goals        Problem: Occupational Therapy Goal    Goal Priority Disciplines Outcome Interventions   Occupational Therapy Goal     OT, PT/OT Ongoing, Progressing    Description: Goals to be met by: 3/28/2022    Patient will increase functional independence with ADLs by performing:    UE Dressing with Minimal Assistance.  LE Dressing with Maximum Assistance.  Grooming while standing at sink with Stand-by Assistance.   Toileting from toilet with Minimal Assistance for hygiene and clothing management.   Step transfer with Stand-by Assistance.    Toilet transfer to toilet with Stand-by Assistance.      COMPLETED GOALS:  Step transfer with Minimal Assistance.  GOAL MET 3/23/2022.  Toilet transfer to toilet with Minimal Assistance.  GOAL MET 3/23/2022.  Grooming while standing at sink with Minimal Assistance.  GOAL MET 3/23/2022.                     Time Tracking:     OT Date of Treatment: 03/25/22  OT Start Time: 1312  OT Stop Time: 1335  OT Total Time (min): 23 min    Billable Minutes:Self Care/Home Management 10  Therapeutic Activity 13    OT/MIGUEL: OT          3/25/2022

## 2022-03-25 NOTE — PLAN OF CARE
CM spoke to Pt, she states she is going to a facility for therapy. Pt states she will go to her daughter's home after therapy. Pt states she does not feel well enough to go to her daughter's home now.    CM spoke to Denise at [facility] to inquire about clearance for today. Denise states the  is off today and will return on Monday. Denise is not aware of the financial issues barring acceptance, but she thinks it is something about buying a home and an additional account.    Pt and daughter state they know nothing about another avt.    CM to follow for plans and arrangements.   03/25/22 4105   Discharge Reassessment   Assessment Type Discharge Planning Reassessment   Did the patient's condition or plan change since previous assessment? No   Discharge Plan discussed with: Patient   Communicated SHANIKA with patient/caregiver Date not available/Unable to determine   Discharge Plan A Skilled Nursing Facility   Discharge Plan B Home Health;Home with family   DME Needed Upon Discharge    (to be decided)   Why the patient remains in the hospital Placement issues   Post-Acute Status   Post-Acute Placement Status Referrals Sent   Patient choice form signed by patient/caregiver List with quality metrics by geographic area provided;List from CMS Compare;List from System Post-Acute Care   Discharge Delays (!) Payor Issues

## 2022-03-25 NOTE — PROGRESS NOTES
[facility] Medicine  Telemedicine Progress Note    Patient Name: Pearl Villafana  MRN: [id number]  Patient Class: IP- Inpatient   Admission Date: 3/12/2022  Length of Stay: 13 days  Attending Physician: Kelechi Thomas MD  Primary Care Provider: lCeopatra Whaley MD          Subjective:     Principal Problem:Sepsis        HPI:  Calista Kang is an 81-year-old female with a past medical history of alcohol abuse, polypharmacy, colon cancer, breast cancer and hyponatremia along with diabetes hyper tension hyperlipidemia B12 deficiency and gout.  Her family contacted EMS to do a welfare check and they found her down on the ground with altered mental status in urine and feces.  Length of time down on known.  Patient is arousable to tactile touch in the bed.  She is able to tell her name her location and her date of birth.  She does follow simple commands.  Patient answers no to most questions.  She does name objects correctly.  Patient is hypothermic at 95.5° and tachycardic at 104 with moderate bacteriuria in her urinalysis. Code sepsis was called patient received a bolus of normal saline vancomycin and Zosyn.  No signs of respiratory distress.  Patient has some bruising to her right right lateral abdominal area.  No abdominal distension.  No tenderness or guarding to abdominal area.  Patient denies pain.  Patient reports she does not know why mg for how long.  She says that she remembers eating breakfast yesterday and going to some doctor appointments.  There is no family at bedside and her 's phone number is disconnected.  There are no other family members phone numbers in her chart.  Unable to confirm her history or her medications at this time.  Patient admitted to hospital medicine for further workup.      Overview/Hospital Course:  See below      Interval History:    Ms. Villafana was stable overnight with no new symptoms or complaints. She is awaiting placement. Unable to  reach daughter for paperwork needed.     Review of Systems   Constitutional:  Positive for fatigue. Negative for chills and fever.   Respiratory:  Negative for cough and shortness of breath.    Cardiovascular:  Negative for chest pain and leg swelling.   Gastrointestinal:  Positive for diarrhea (improved). Negative for abdominal pain, constipation, nausea and vomiting.   Neurological:  Positive for weakness. Negative for numbness.   Objective:     Vital Signs (Most Recent):  Temp: 98.5 °F (36.9 °C) (03/25/22 1131)  Pulse: 76 (03/25/22 1131)  Resp: 18 (03/25/22 1131)  BP: (!) 103/52 (03/25/22 1131)  SpO2: 98 % (03/25/22 1131)   Vital Signs (24h Range):  Temp:  [97 °F (36.1 °C)-98.9 °F (37.2 °C)] 98.5 °F (36.9 °C)  Pulse:  [68-77] 76  Resp:  [16-20] 18  SpO2:  [96 %-100 %] 98 %  BP: (103-170)/(52-73) 103/52     Weight: 68.5 kg (151 lb)  Body mass index is 24.37 kg/m².    Intake/Output Summary (Last 24 hours) at 3/25/2022 1258  Last data filed at 3/25/2022 0600  Gross per 24 hour   Intake --   Output 1000 ml   Net -1000 ml        Physical Exam  Constitutional:       General: She is not in acute distress.  HENT:      Head: Normocephalic and atraumatic.   Eyes:      General: No scleral icterus.        Right eye: No discharge.         Left eye: No discharge.   Pulmonary:      Effort: Pulmonary effort is normal.      Comments: Breathing comfortably  Abdominal:      General: There is no distension.   Musculoskeletal:      Comments: Moves all extremities well   Skin:     Findings: No erythema or rash.   Neurological:      General: No focal deficit present.      Mental Status: She is alert and oriented to person, place, and time.      Comments: Alert, conversant   Psychiatric:         Mood and Affect: Mood normal.         Behavior: Behavior normal.       Significant Labs: All pertinent labs within the past 24 hours have been reviewed.    Significant Imaging: I have reviewed all pertinent imaging results/findings within the  past 24 hours.      Assessment/Plan:      * Sepsis  Patient presented after family contacted EMS to do a welfare check - they found her down on the ground with altered mental status in urine and feces.  Length of time down on known.  Patient was hypothermic at 95.5° and tachycardic at 104 with moderate bacteriuria in her urinalysis.  Code sepsis was called in ED and patient received a bolus of normal saline, as well as IV Vancomycin and Zosyn. Chest x-ray was negative for any consolidation or pleural effusions.  Concern for UTI due to moderate bacteriuria on analysis.  WBC was elevated at 14.77 with left shift, Procalcitonin of 0.34, normal lactic acid, normal ammonia, negative ETOH and Tox screen.  Blood cultures growing 1/4 bottles with Coag-negative Staph (likely contaminant)  and Urine culture was negative. Antibiotics were d/c on 3/14/2022.  Failed Voiding Trial yesterday and Zayas replaced overnight.  Patient is awake and oriented to person, place, month, year, but not to President.  Can do serial 7s to 14.  Awaiting SNF placement.    Plan:  Follow off antibiotics for now - discontinued pharmacy consult order  PT consulted - recommend SNF - patient agrees  Continue Zayas with repeat Voiding Trial while at SNF    Decubitus ulcer of back, stage 1  -Continue with wound care      ETOH abuse  Elevated beta hydroxybutyrate.  Ethanol less than 10. Last drink on known.  Patient with history of beer potamania.  Stable without need for benzodiazepine treatment.  -Monitor CIWA scale every 4 hours.      Acute renal failure superimposed on stage 3 chronic kidney disease    - Resolved      Altered mental status, unspecified    - Resolved      Hyponatremia    - Resolved      Colon cancer  History of colon cancer and breast cancer.  Occult stool neg.  H&H stable.  Will monitor.      Diabetes mellitus type II    - Improving, last BG at goal, only mild elevations over last 24h  - Not associated with long-term insulin use  -  Complicated by hyperglycemia, BG still above goal on DM diet. Improves w/ SSI  - Home Meds:  Metformin 500 mg daily at home per chart.    A1c is 6.6  -Continue to hold Metformin at this time.    -Continue Low-dose correction sliding scale   - Continue detemir 5 units BID      Hyperlipidemia  On Crestor as outpatient - held this time due to elevation of liver enzymes.      Hypertension    - Improved, last BP normal  - Continue losartan 50 mg BID  - Continue coreg at 25 mg PO BID  - Continue hydralazine 25 mg PO Q6H PRN SBP > 170   - Continue amlodipine 5 mg daily  - Monitor and adjust as needed      VTE Risk Mitigation (From admission, onward)         Ordered     Reason for No Pharmacological VTE Prophylaxis  Once        Question:  Reasons:  Answer:  Active Bleeding    03/12/22 2041     IP VTE HIGH RISK PATIENT  Once         03/12/22 2041     Place sequential compression device  Until discontinued         03/12/22 2041                      I have assessed these finding virtually using telemed platform and with assistance of bedside nurse                 The attending portion of this evaluation, treatment, and documentation was performed per Kelechi Thomas MD via Telemedicine AudioVisual using the secure [employer] software platform with 2 way audio/video. The provider was located off-site and the patient is located in the hospital. The aforementioned video software was utilized to document the relevant history and physical exam    Kelechi Thomas MD  Department of Hospital Medicine   [facility] ([place])

## 2022-03-25 NOTE — PLAN OF CARE
POC reviewed w/ pt and purposeful rounding complete. Meds administered per MAR. Blood glucose monitored and PRN insulin administered per order. VSS on RA. CIWA q4h completed. Zayas intact draining clear yellow urine and CAUTI complete. Pt AAOx4 and 1 assist to ambulate. Bed alarm on and call bell w/ in reach. Safety precautions intact; no injuries or falls this shift. Will continue to monitor.

## 2022-03-26 LAB
POCT GLUCOSE: 131 MG/DL (ref 70–110)
POCT GLUCOSE: 223 MG/DL (ref 70–110)
POCT GLUCOSE: 234 MG/DL (ref 70–110)
POCT GLUCOSE: 239 MG/DL (ref 70–110)

## 2022-03-26 PROCEDURE — 99231 SBSQ HOSP IP/OBS SF/LOW 25: CPT | Mod: 95,,, | Performed by: STUDENT IN AN ORGANIZED HEALTH CARE EDUCATION/TRAINING PROGRAM

## 2022-03-26 PROCEDURE — 11000001 HC ACUTE MED/SURG PRIVATE ROOM

## 2022-03-26 PROCEDURE — 25000003 PHARM REV CODE 250: Performed by: INTERNAL MEDICINE

## 2022-03-26 PROCEDURE — 99231 PR SUBSEQUENT HOSPITAL CARE,LEVL I: ICD-10-PCS | Mod: 95,,, | Performed by: STUDENT IN AN ORGANIZED HEALTH CARE EDUCATION/TRAINING PROGRAM

## 2022-03-26 RX ADMIN — LOSARTAN POTASSIUM 50 MG: 50 TABLET, FILM COATED ORAL at 09:03

## 2022-03-26 RX ADMIN — LOSARTAN POTASSIUM 50 MG: 50 TABLET, FILM COATED ORAL at 08:03

## 2022-03-26 RX ADMIN — CARVEDILOL 25 MG: 12.5 TABLET, FILM COATED ORAL at 08:03

## 2022-03-26 RX ADMIN — CARVEDILOL 25 MG: 12.5 TABLET, FILM COATED ORAL at 09:03

## 2022-03-26 RX ADMIN — INSULIN ASPART 1 UNITS: 100 INJECTION, SOLUTION INTRAVENOUS; SUBCUTANEOUS at 09:03

## 2022-03-26 RX ADMIN — INSULIN ASPART 2 UNITS: 100 INJECTION, SOLUTION INTRAVENOUS; SUBCUTANEOUS at 06:03

## 2022-03-26 RX ADMIN — AMLODIPINE BESYLATE 2.5 MG: 2.5 TABLET ORAL at 08:03

## 2022-03-26 NOTE — PLAN OF CARE
POC reviewed with patient. AAOx4. VS stable on room air. No complaints verbalized during shift. Zayas intact and draining.  Turn Q2/frequent weight shift encouraged by RN. Low air loss mattress in use. Instructed to call for help ambulating. No injuries, falls, or trauma occurred during shift. Purposeful rounding completed. Bed low and locked with side rails up x 3 and call light within reach.

## 2022-03-26 NOTE — SUBJECTIVE & OBJECTIVE
Interval History:    Ms. Villafana was stable overnight with no new symptoms or complaints. She is awaiting placement. Will reach out again on Monday.     Review of Systems   Constitutional:  Positive for fatigue. Negative for chills and fever.   Respiratory:  Negative for cough and shortness of breath.    Cardiovascular:  Negative for chest pain and leg swelling.   Gastrointestinal:  Negative for abdominal pain, constipation, diarrhea, nausea and vomiting.   Neurological:  Positive for weakness. Negative for numbness.   Objective:     Vital Signs (Most Recent):  Temp: 98 °F (36.7 °C) (03/26/22 1702)  Pulse: 73 (03/26/22 1702)  Resp: 16 (03/26/22 1702)  BP: (!) 116/57 (03/26/22 1702)  SpO2: 99 % (03/26/22 1702)   Vital Signs (24h Range):  Temp:  [96.3 °F (35.7 °C)-99.4 °F (37.4 °C)] 98 °F (36.7 °C)  Pulse:  [70-80] 73  Resp:  [16-18] 16  SpO2:  [96 %-99 %] 99 %  BP: (116-159)/(56-71) 116/57     Weight: 68.5 kg (151 lb)  Body mass index is 24.37 kg/m².    Intake/Output Summary (Last 24 hours) at 3/26/2022 1742  Last data filed at 3/26/2022 1600  Gross per 24 hour   Intake 100 ml   Output 1200 ml   Net -1100 ml        Physical Exam  Constitutional:       General: She is not in acute distress.  HENT:      Head: Normocephalic and atraumatic.   Eyes:      General: No scleral icterus.        Right eye: No discharge.         Left eye: No discharge.   Pulmonary:      Effort: Pulmonary effort is normal.      Comments: Breathing comfortably  Abdominal:      General: There is no distension.   Musculoskeletal:      Comments: Moves all extremities well   Skin:     Findings: No erythema or rash.   Neurological:      General: No focal deficit present.      Mental Status: She is alert and oriented to person, place, and time.      Comments: Alert, conversant   Psychiatric:         Mood and Affect: Mood normal.         Behavior: Behavior normal.       Significant Labs: All pertinent labs within the past 24 hours have been  reviewed.    Significant Imaging: I have reviewed all pertinent imaging results/findings within the past 24 hours.

## 2022-03-26 NOTE — PROGRESS NOTES
[facility] Medicine  Telemedicine Progress Note    Patient Name: Pearl Villafana  MRN: [id number]  Patient Class: IP- Inpatient   Admission Date: 3/12/2022  Length of Stay: 14 days  Attending Physician: Kelechi Thomas MD  Primary Care Provider: Cleopatra Whaley MD          Subjective:     Principal Problem:Sepsis        HPI:  Calista Kang is an 81-year-old female with a past medical history of alcohol abuse, polypharmacy, colon cancer, breast cancer and hyponatremia along with diabetes hyper tension hyperlipidemia B12 deficiency and gout.  Her family contacted EMS to do a welfare check and they found her down on the ground with altered mental status in urine and feces.  Length of time down on known.  Patient is arousable to tactile touch in the bed.  She is able to tell her name her location and her date of birth.  She does follow simple commands.  Patient answers no to most questions.  She does name objects correctly.  Patient is hypothermic at 95.5° and tachycardic at 104 with moderate bacteriuria in her urinalysis. Code sepsis was called patient received a bolus of normal saline vancomycin and Zosyn.  No signs of respiratory distress.  Patient has some bruising to her right right lateral abdominal area.  No abdominal distension.  No tenderness or guarding to abdominal area.  Patient denies pain.  Patient reports she does not know why mg for how long.  She says that she remembers eating breakfast yesterday and going to some doctor appointments.  There is no family at bedside and her 's phone number is disconnected.  There are no other family members phone numbers in her chart.  Unable to confirm her history or her medications at this time.  Patient admitted to hospital medicine for further workup.      Overview/Hospital Course:  See below      Interval History:    Ms. Villafana was stable overnight with no new symptoms or complaints. She is awaiting placement. Will reach out  again on Monday.     Review of Systems   Constitutional:  Positive for fatigue. Negative for chills and fever.   Respiratory:  Negative for cough and shortness of breath.    Cardiovascular:  Negative for chest pain and leg swelling.   Gastrointestinal:  Negative for abdominal pain, constipation, diarrhea, nausea and vomiting.   Neurological:  Positive for weakness. Negative for numbness.   Objective:     Vital Signs (Most Recent):  Temp: 98 °F (36.7 °C) (03/26/22 1702)  Pulse: 73 (03/26/22 1702)  Resp: 16 (03/26/22 1702)  BP: (!) 116/57 (03/26/22 1702)  SpO2: 99 % (03/26/22 1702)   Vital Signs (24h Range):  Temp:  [96.3 °F (35.7 °C)-99.4 °F (37.4 °C)] 98 °F (36.7 °C)  Pulse:  [70-80] 73  Resp:  [16-18] 16  SpO2:  [96 %-99 %] 99 %  BP: (116-159)/(56-71) 116/57     Weight: 68.5 kg (151 lb)  Body mass index is 24.37 kg/m².    Intake/Output Summary (Last 24 hours) at 3/26/2022 1742  Last data filed at 3/26/2022 1600  Gross per 24 hour   Intake 100 ml   Output 1200 ml   Net -1100 ml        Physical Exam  Constitutional:       General: She is not in acute distress.  HENT:      Head: Normocephalic and atraumatic.   Eyes:      General: No scleral icterus.        Right eye: No discharge.         Left eye: No discharge.   Pulmonary:      Effort: Pulmonary effort is normal.      Comments: Breathing comfortably  Abdominal:      General: There is no distension.   Musculoskeletal:      Comments: Moves all extremities well   Skin:     Findings: No erythema or rash.   Neurological:      General: No focal deficit present.      Mental Status: She is alert and oriented to person, place, and time.      Comments: Alert, conversant   Psychiatric:         Mood and Affect: Mood normal.         Behavior: Behavior normal.       Significant Labs: All pertinent labs within the past 24 hours have been reviewed.    Significant Imaging: I have reviewed all pertinent imaging results/findings within the past 24 hours.      Assessment/Plan:      *  Sepsis  Patient presented after family contacted EMS to do a welfare check - they found her down on the ground with altered mental status in urine and feces.  Length of time down on known.  Patient was hypothermic at 95.5° and tachycardic at 104 with moderate bacteriuria in her urinalysis.  Code sepsis was called in ED and patient received a bolus of normal saline, as well as IV Vancomycin and Zosyn. Chest x-ray was negative for any consolidation or pleural effusions.  Concern for UTI due to moderate bacteriuria on analysis.  WBC was elevated at 14.77 with left shift, Procalcitonin of 0.34, normal lactic acid, normal ammonia, negative ETOH and Tox screen.  Blood cultures growing 1/4 bottles with Coag-negative Staph (likely contaminant)  and Urine culture was negative. Antibiotics were d/c on 3/14/2022.  Failed Voiding Trial yesterday and Zayas replaced overnight.  Patient is awake and oriented to person, place, month, year, but not to President.  Can do serial 7s to 14.  Awaiting SNF placement.    Plan:  Follow off antibiotics for now - discontinued pharmacy consult order  PT consulted - recommend SNF - patient agrees  Continue Zayas with repeat Voiding Trial while at SNF    Decubitus ulcer of back, stage 1  -Continue with wound care      ETOH abuse  Elevated beta hydroxybutyrate.  Ethanol less than 10. Last drink on known.  Patient with history of beer potamania.  Stable without need for benzodiazepine treatment.  -Monitor CIWA scale every 4 hours.      Acute renal failure superimposed on stage 3 chronic kidney disease    - Resolved      Altered mental status, unspecified    - Resolved      Hyponatremia    - Resolved      Colon cancer  History of colon cancer and breast cancer.  Occult stool neg.  H&H stable.  Will monitor.      Diabetes mellitus type II    - Improving, last BG at goal, only mild elevations over last 24h  - Not associated with long-term insulin use  - Complicated by hyperglycemia, BG still above  goal on DM diet. Improves w/ SSI  - Home Meds:  Metformin 500 mg daily at home per chart.    A1c is 6.6  -Continue to hold Metformin at this time.    -Continue Low-dose correction sliding scale   - Continue detemir 5 units BID      Hyperlipidemia  On Crestor as outpatient - held this time due to elevation of liver enzymes.      Hypertension    - Improved, last BP normal  - Continue losartan 50 mg BID  - Continue coreg at 25 mg PO BID  - Continue hydralazine 25 mg PO Q6H PRN SBP > 170   - Continue amlodipine 5 mg daily  - Monitor and adjust as needed      VTE Risk Mitigation (From admission, onward)         Ordered     Reason for No Pharmacological VTE Prophylaxis  Once        Question:  Reasons:  Answer:  Active Bleeding    03/12/22 2041     IP VTE HIGH RISK PATIENT  Once         03/12/22 2041     Place sequential compression device  Until discontinued         03/12/22 2041                      I have assessed these finding virtually using telemed platform and with assistance of bedside nurse                 The attending portion of this evaluation, treatment, and documentation was performed per Kelechi Thomas MD via Telemedicine AudioVisual using the secure [employer] software platform with 2 way audio/video. The provider was located off-site and the patient is located in the hospital. The aforementioned video software was utilized to document the relevant history and physical exam    Kelechi Thomas MD  Department of Hospital Medicine   [facility] ([employer])

## 2022-03-27 LAB
POCT GLUCOSE: 136 MG/DL (ref 70–110)
POCT GLUCOSE: 243 MG/DL (ref 70–110)
POCT GLUCOSE: 244 MG/DL (ref 70–110)
POCT GLUCOSE: 282 MG/DL (ref 70–110)

## 2022-03-27 PROCEDURE — 21400001 HC TELEMETRY ROOM

## 2022-03-27 PROCEDURE — 99231 SBSQ HOSP IP/OBS SF/LOW 25: CPT | Mod: 95,,, | Performed by: STUDENT IN AN ORGANIZED HEALTH CARE EDUCATION/TRAINING PROGRAM

## 2022-03-27 PROCEDURE — 99231 PR SUBSEQUENT HOSPITAL CARE,LEVL I: ICD-10-PCS | Mod: 95,,, | Performed by: STUDENT IN AN ORGANIZED HEALTH CARE EDUCATION/TRAINING PROGRAM

## 2022-03-27 PROCEDURE — 25000003 PHARM REV CODE 250: Performed by: INTERNAL MEDICINE

## 2022-03-27 RX ADMIN — LIDOCAINE 5% 1 PATCH: 700 PATCH TOPICAL at 03:03

## 2022-03-27 RX ADMIN — AMLODIPINE BESYLATE 2.5 MG: 2.5 TABLET ORAL at 09:03

## 2022-03-27 RX ADMIN — INSULIN ASPART 1 UNITS: 100 INJECTION, SOLUTION INTRAVENOUS; SUBCUTANEOUS at 09:03

## 2022-03-27 RX ADMIN — LOPERAMIDE HYDROCHLORIDE 2 MG: 2 CAPSULE ORAL at 06:03

## 2022-03-27 RX ADMIN — LOSARTAN POTASSIUM 50 MG: 50 TABLET, FILM COATED ORAL at 09:03

## 2022-03-27 RX ADMIN — INSULIN ASPART 3 UNITS: 100 INJECTION, SOLUTION INTRAVENOUS; SUBCUTANEOUS at 04:03

## 2022-03-27 RX ADMIN — INSULIN ASPART 2 UNITS: 100 INJECTION, SOLUTION INTRAVENOUS; SUBCUTANEOUS at 12:03

## 2022-03-27 RX ADMIN — CARVEDILOL 25 MG: 12.5 TABLET, FILM COATED ORAL at 09:03

## 2022-03-27 NOTE — NURSING
awake and alert, somewhat agitated this am states she wants to go to rehab and get out of hospital, confused to date and admits increased confusion since admission, reoriented and placed bed alarm on with call bell in reach.

## 2022-03-28 ENCOUNTER — PATIENT OUTREACH (OUTPATIENT)
Dept: ADMINISTRATIVE | Facility: OTHER | Age: 82
End: 2022-03-28
Payer: MEDICARE

## 2022-03-28 VITALS
DIASTOLIC BLOOD PRESSURE: 53 MMHG | RESPIRATION RATE: 18 BRPM | SYSTOLIC BLOOD PRESSURE: 108 MMHG | TEMPERATURE: 99 F | BODY MASS INDEX: 24.27 KG/M2 | OXYGEN SATURATION: 98 % | HEIGHT: 66 IN | WEIGHT: 151 LBS | HEART RATE: 75 BPM

## 2022-03-28 LAB
POCT GLUCOSE: 120 MG/DL (ref 70–110)
POCT GLUCOSE: 293 MG/DL (ref 70–110)
POCT GLUCOSE: 299 MG/DL (ref 70–110)

## 2022-03-28 PROCEDURE — 25000003 PHARM REV CODE 250: Performed by: INTERNAL MEDICINE

## 2022-03-28 PROCEDURE — 99239 HOSP IP/OBS DSCHRG MGMT >30: CPT | Mod: 95,,, | Performed by: STUDENT IN AN ORGANIZED HEALTH CARE EDUCATION/TRAINING PROGRAM

## 2022-03-28 PROCEDURE — 1111F DSCHRG MED/CURRENT MED MERGE: CPT | Mod: 95,CPTII,, | Performed by: STUDENT IN AN ORGANIZED HEALTH CARE EDUCATION/TRAINING PROGRAM

## 2022-03-28 PROCEDURE — 99239 PR HOSPITAL DISCHARGE DAY,>30 MIN: ICD-10-PCS | Mod: 95,,, | Performed by: STUDENT IN AN ORGANIZED HEALTH CARE EDUCATION/TRAINING PROGRAM

## 2022-03-28 PROCEDURE — 1111F PR DISCHARGE MEDS RECONCILED W/ CURRENT OUTPATIENT MED LIST: ICD-10-PCS | Mod: 95,CPTII,, | Performed by: STUDENT IN AN ORGANIZED HEALTH CARE EDUCATION/TRAINING PROGRAM

## 2022-03-28 PROCEDURE — 97530 THERAPEUTIC ACTIVITIES: CPT

## 2022-03-28 RX ORDER — LOSARTAN POTASSIUM 50 MG/1
50 TABLET ORAL 2 TIMES DAILY
Qty: 180 TABLET | Refills: 3 | Status: SHIPPED | OUTPATIENT
Start: 2022-03-28 | End: 2023-03-28

## 2022-03-28 RX ORDER — CARVEDILOL 25 MG/1
25 TABLET ORAL 2 TIMES DAILY
Qty: 60 TABLET | Refills: 11 | Status: SHIPPED | OUTPATIENT
Start: 2022-03-28 | End: 2023-03-28

## 2022-03-28 RX ORDER — INSULIN ASPART 100 [IU]/ML
0-5 INJECTION, SOLUTION INTRAVENOUS; SUBCUTANEOUS
Qty: 9 ML | Refills: 0 | Status: SHIPPED | OUTPATIENT
Start: 2022-03-28 | End: 2022-03-28 | Stop reason: HOSPADM

## 2022-03-28 RX ORDER — METFORMIN HYDROCHLORIDE 500 MG/1
500 TABLET, EXTENDED RELEASE ORAL
Qty: 90 TABLET | Refills: 3 | Status: SHIPPED | OUTPATIENT
Start: 2022-03-28 | End: 2023-03-28

## 2022-03-28 RX ORDER — AMLODIPINE BESYLATE 2.5 MG/1
2.5 TABLET ORAL DAILY
Qty: 30 TABLET | Refills: 11 | Status: SHIPPED | OUTPATIENT
Start: 2022-03-28 | End: 2023-03-28

## 2022-03-28 RX ADMIN — CARVEDILOL 25 MG: 12.5 TABLET, FILM COATED ORAL at 08:03

## 2022-03-28 RX ADMIN — LIDOCAINE 5% 1 PATCH: 700 PATCH TOPICAL at 04:03

## 2022-03-28 RX ADMIN — INSULIN ASPART 3 UNITS: 100 INJECTION, SOLUTION INTRAVENOUS; SUBCUTANEOUS at 04:03

## 2022-03-28 RX ADMIN — AMLODIPINE BESYLATE 2.5 MG: 2.5 TABLET ORAL at 08:03

## 2022-03-28 RX ADMIN — INSULIN ASPART 3 UNITS: 100 INJECTION, SOLUTION INTRAVENOUS; SUBCUTANEOUS at 12:03

## 2022-03-28 RX ADMIN — LOSARTAN POTASSIUM 50 MG: 50 TABLET, FILM COATED ORAL at 08:03

## 2022-03-28 NOTE — SUBJECTIVE & OBJECTIVE
Interval History:    Ms. Villafana was stable overnight with no new symptoms or complaints. She is awaiting placement. Will reach out again on Monday.     Review of Systems   Constitutional:  Positive for fatigue. Negative for chills and fever.   Respiratory:  Negative for cough and shortness of breath.    Cardiovascular:  Negative for chest pain and leg swelling.   Gastrointestinal:  Negative for abdominal pain, constipation, diarrhea, nausea and vomiting.   Neurological:  Positive for weakness. Negative for numbness.   Objective:     Vital Signs (Most Recent):  Temp: 97.3 °F (36.3 °C) (03/27/22 1610)  Pulse: 79 (03/27/22 1610)  Resp: 16 (03/27/22 1610)  BP: (!) 125/58 (03/27/22 1610)  SpO2: 99 % (03/27/22 1610)   Vital Signs (24h Range):  Temp:  [97.3 °F (36.3 °C)-98.7 °F (37.1 °C)] 97.3 °F (36.3 °C)  Pulse:  [68-79] 79  Resp:  [16-18] 16  SpO2:  [97 %-100 %] 99 %  BP: (125-136)/(58-65) 125/58     Weight: 68.5 kg (151 lb)  Body mass index is 24.37 kg/m².    Intake/Output Summary (Last 24 hours) at 3/27/2022 1955  Last data filed at 3/27/2022 1816  Gross per 24 hour   Intake 1200 ml   Output 1450 ml   Net -250 ml        Physical Exam  Constitutional:       General: She is not in acute distress.  HENT:      Head: Normocephalic and atraumatic.   Eyes:      General: No scleral icterus.        Right eye: No discharge.         Left eye: No discharge.   Pulmonary:      Effort: Pulmonary effort is normal.      Comments: Breathing comfortably  Abdominal:      General: There is no distension.   Musculoskeletal:      Comments: Moves all extremities well   Skin:     Findings: No erythema or rash.   Neurological:      General: No focal deficit present.      Mental Status: She is alert and oriented to person, place, and time.      Comments: Alert, conversant   Psychiatric:         Mood and Affect: Mood normal.         Behavior: Behavior normal.       Significant Labs: All pertinent labs within the past 24 hours have been  reviewed.    Significant Imaging: I have reviewed all pertinent imaging results/findings within the past 24 hours.

## 2022-03-28 NOTE — PLAN OF CARE
Problem: Occupational Therapy Goal  Goal: Occupational Therapy Goal  Description: Goals to be met by: 4/4/2022    Patient will increase functional independence with ADLs by performing:    UE Dressing with SBA.  LE Dressing with Maximum Assistance.  Grooming while standing at sink with Stand-by Assistance.   Toileting from toilet with Minimal Assistance for hygiene and clothing management.   Step transfer with Stand-by Assistance.    Toilet transfer to toilet with Stand-by Assistance.      COMPLETED GOALS:  Step transfer with Minimal Assistance.  GOAL MET 3/23/2022.  Toilet transfer to toilet with Minimal Assistance.  GOAL MET 3/23/2022.  Grooming while standing at sink with Minimal Assistance.  GOAL MET 3/23/2022.  UE Dressing with Minimal Assistance.  MET 3/28/2022    Outcome: Ongoing, Progressing     Pt met one goal this date and is making progress towards others.  SNF is recommended upon d/c from acute care to further address deficits and help pt improve overall functional independence.     EMMY Siu  3/28/2022

## 2022-03-28 NOTE — PROGRESS NOTES
[facility] Medicine  Telemedicine Progress Note    Patient Name: Pearl Villafana  MRN: [id number]  Patient Class: IP- Inpatient   Admission Date: 3/12/2022  Length of Stay: 15 days  Attending Physician: Kelechi Thomas MD  Primary Care Provider: Cleopatra Whaley MD          Subjective:     Principal Problem:Sepsis        HPI:  Calista Kang is an 81-year-old female with a past medical history of alcohol abuse, polypharmacy, colon cancer, breast cancer and hyponatremia along with diabetes hyper tension hyperlipidemia B12 deficiency and gout.  Her family contacted EMS to do a welfare check and they found her down on the ground with altered mental status in urine and feces.  Length of time down on known.  Patient is arousable to tactile touch in the bed.  She is able to tell her name her location and her date of birth.  She does follow simple commands.  Patient answers no to most questions.  She does name objects correctly.  Patient is hypothermic at 95.5° and tachycardic at 104 with moderate bacteriuria in her urinalysis. Code sepsis was called patient received a bolus of normal saline vancomycin and Zosyn.  No signs of respiratory distress.  Patient has some bruising to her right right lateral abdominal area.  No abdominal distension.  No tenderness or guarding to abdominal area.  Patient denies pain.  Patient reports she does not know why mg for how long.  She says that she remembers eating breakfast yesterday and going to some doctor appointments.  There is no family at bedside and her 's phone number is disconnected.  There are no other family members phone numbers in her chart.  Unable to confirm her history or her medications at this time.  Patient admitted to hospital medicine for further workup.      Overview/Hospital Course:  See below      Interval History:    Ms. Villafana was stable overnight with no new symptoms or complaints. She is awaiting placement. Will reach out  again on Monday.     Review of Systems   Constitutional:  Positive for fatigue. Negative for chills and fever.   Respiratory:  Negative for cough and shortness of breath.    Cardiovascular:  Negative for chest pain and leg swelling.   Gastrointestinal:  Negative for abdominal pain, constipation, diarrhea, nausea and vomiting.   Neurological:  Positive for weakness. Negative for numbness.   Objective:     Vital Signs (Most Recent):  Temp: 97.3 °F (36.3 °C) (03/27/22 1610)  Pulse: 79 (03/27/22 1610)  Resp: 16 (03/27/22 1610)  BP: (!) 125/58 (03/27/22 1610)  SpO2: 99 % (03/27/22 1610)   Vital Signs (24h Range):  Temp:  [97.3 °F (36.3 °C)-98.7 °F (37.1 °C)] 97.3 °F (36.3 °C)  Pulse:  [68-79] 79  Resp:  [16-18] 16  SpO2:  [97 %-100 %] 99 %  BP: (125-136)/(58-65) 125/58     Weight: 68.5 kg (151 lb)  Body mass index is 24.37 kg/m².    Intake/Output Summary (Last 24 hours) at 3/27/2022 1955  Last data filed at 3/27/2022 1816  Gross per 24 hour   Intake 1200 ml   Output 1450 ml   Net -250 ml        Physical Exam  Constitutional:       General: She is not in acute distress.  HENT:      Head: Normocephalic and atraumatic.   Eyes:      General: No scleral icterus.        Right eye: No discharge.         Left eye: No discharge.   Pulmonary:      Effort: Pulmonary effort is normal.      Comments: Breathing comfortably  Abdominal:      General: There is no distension.   Musculoskeletal:      Comments: Moves all extremities well   Skin:     Findings: No erythema or rash.   Neurological:      General: No focal deficit present.      Mental Status: She is alert and oriented to person, place, and time.      Comments: Alert, conversant   Psychiatric:         Mood and Affect: Mood normal.         Behavior: Behavior normal.       Significant Labs: All pertinent labs within the past 24 hours have been reviewed.    Significant Imaging: I have reviewed all pertinent imaging results/findings within the past 24 hours.      Assessment/Plan:       * Sepsis  Patient presented after family contacted EMS to do a welfare check - they found her down on the ground with altered mental status in urine and feces.  Length of time down on known.  Patient was hypothermic at 95.5° and tachycardic at 104 with moderate bacteriuria in her urinalysis.  Code sepsis was called in ED and patient received a bolus of normal saline, as well as IV Vancomycin and Zosyn. Chest x-ray was negative for any consolidation or pleural effusions.  Concern for UTI due to moderate bacteriuria on analysis.  WBC was elevated at 14.77 with left shift, Procalcitonin of 0.34, normal lactic acid, normal ammonia, negative ETOH and Tox screen.  Blood cultures growing 1/4 bottles with Coag-negative Staph (likely contaminant)  and Urine culture was negative. Antibiotics were d/c on 3/14/2022.  Failed Voiding Trial yesterday and Zayas replaced overnight.  Patient is awake and oriented to person, place, month, year, but not to President.  Can do serial 7s to 14.  Awaiting SNF placement.    Plan:  Follow off antibiotics for now - discontinued pharmacy consult order  PT consulted - recommend SNF - patient agrees  Continue Zayas with repeat Voiding Trial while at SNF    Decubitus ulcer of back, stage 1  -Continue with wound care      ETOH abuse  Elevated beta hydroxybutyrate.  Ethanol less than 10. Last drink on known.  Patient with history of beer potamania.  Stable without need for benzodiazepine treatment.  -Monitor CIWA scale every 4 hours.      Acute renal failure superimposed on stage 3 chronic kidney disease    - Resolved      Altered mental status, unspecified    - Resolved      Hyponatremia    - Resolved      Colon cancer  History of colon cancer and breast cancer.  Occult stool neg.  H&H stable.  Will monitor.      Diabetes mellitus type II    - Improving, last BG at goal, only mild elevations over last 24h  - Not associated with long-term insulin use  - Complicated by hyperglycemia, BG still  above goal on DM diet. Improves w/ SSI  - Home Meds:  Metformin 500 mg daily at home per chart.    A1c is 6.6  -Continue to hold Metformin at this time.    -Continue Low-dose correction sliding scale   - Continue detemir 5 units BID      Hyperlipidemia  On Crestor as outpatient - held this time due to elevation of liver enzymes.      Hypertension    - Improved, last BP normal  - Continue losartan 50 mg BID  - Continue coreg at 25 mg PO BID  - Continue hydralazine 25 mg PO Q6H PRN SBP > 170   - Continue amlodipine 5 mg daily  - Monitor and adjust as needed      VTE Risk Mitigation (From admission, onward)         Ordered     Reason for No Pharmacological VTE Prophylaxis  Once        Question:  Reasons:  Answer:  Active Bleeding    03/12/22 2041     IP VTE HIGH RISK PATIENT  Once         03/12/22 2041     Place sequential compression device  Until discontinued         03/12/22 2041                      I have assessed these finding virtually using telemed platform and with assistance of bedside nurse                 The attending portion of this evaluation, treatment, and documentation was performed per Kelechi Thomas MD via Telemedicine AudioVisual using the secure [employer] software platform with 2 way audio/video. The provider was located off-site and the patient is located in the hospital. The aforementioned video software was utilized to document the relevant history and physical exam    Kelechi Thomas MD  Department of Hospital Medicine   [facility] ([place])

## 2022-03-28 NOTE — PLAN OF CARE
AAOx4. No acute events this shift. Pt free from falls and complaints of pain. Pt discharged. IV removed. Discharge papers given and reviewed with pt and daughter. Daughter did not have diabetes education so Dr. Thomas changed orders. Pt not to take insulin at home and continue with metformin.      Problem: Infection  Goal: Absence of Infection Signs and Symptoms  Outcome: Adequate for Care Transition     Problem: Adult Inpatient Plan of Care  Goal: Plan of Care Review  Outcome: Adequate for Care Transition  Goal: Patient-Specific Goal (Individualized)  Outcome: Adequate for Care Transition  Goal: Absence of Hospital-Acquired Illness or Injury  Outcome: Adequate for Care Transition  Goal: Optimal Comfort and Wellbeing  Outcome: Adequate for Care Transition  Goal: Readiness for Transition of Care  Outcome: Adequate for Care Transition     Problem: Diabetes Comorbidity  Goal: Blood Glucose Level Within Targeted Range  Outcome: Adequate for Care Transition     Problem: Adjustment to Illness (Sepsis/Septic Shock)  Goal: Optimal Coping  Outcome: Adequate for Care Transition     Problem: Bleeding (Sepsis/Septic Shock)  Goal: Absence of Bleeding  Outcome: Adequate for Care Transition     Problem: Glycemic Control Impaired (Sepsis/Septic Shock)  Goal: Blood Glucose Level Within Desired Range  Outcome: Adequate for Care Transition     Problem: Infection Progression (Sepsis/Septic Shock)  Goal: Absence of Infection Signs and Symptoms  Outcome: Adequate for Care Transition     Problem: Nutrition Impaired (Sepsis/Septic Shock)  Goal: Optimal Nutrition Intake  Outcome: Adequate for Care Transition     Problem: Fluid and Electrolyte Imbalance (Acute Kidney Injury/Impairment)  Goal: Fluid and Electrolyte Balance  Outcome: Adequate for Care Transition     Problem: Oral Intake Inadequate (Acute Kidney Injury/Impairment)  Goal: Optimal Nutrition Intake  Outcome: Adequate for Care Transition     Problem: Renal Function Impairment (Acute  Kidney Injury/Impairment)  Goal: Effective Renal Function  Outcome: Adequate for Care Transition     Problem: Impaired Wound Healing  Goal: Optimal Wound Healing  Outcome: Adequate for Care Transition     Problem: Skin Injury Risk Increased  Goal: Skin Health and Integrity  Outcome: Adequate for Care Transition

## 2022-03-28 NOTE — PT/OT/SLP PROGRESS
Occupational Therapy   Treatment    Name: Pearl Villafana  MRN: [id number]  Admitting Diagnosis:  Sepsis       Recommendations:     Discharge Recommendations: nursing facility, skilled  Discharge Equipment Recommendations:   (TBD at next level of care)  Barriers to discharge:  Decreased caregiver support (current functional level)    Assessment:     Pearl Villafana is a 81 y.o. female with a medical diagnosis of Sepsis.  She presents with . Performance deficits affecting function are weakness, impaired endurance, impaired self care skills, impaired functional mobilty, gait instability, impaired balance, decreased lower extremity function, decreased safety awareness, decreased coordination, impaired cognition.  Pt agreeable to participating in therapy upon arrival to room.  Pt demonstrated improvement with UB dressing performing with Min A while seated at EOB.  While ambulating in room with CGA, RW mild postural instability and decreased safety awareness noted.  Delayed processing noted during portions of session.     Overall, pt tolerated therapy well; however, displayed increased fatigue as session progressed.  She is making progress towards goals and would continue to benefit from skilled OT services to address problems listed above and increase independence with ADLs.  SNF is recommended upon d/c from acute care to further address deficits and help pt improve overall functional independence.       Rehab Prognosis:  Good; patient would benefit from acute skilled OT services to address these deficits and reach maximum level of function.       Plan:     Patient to be seen 5 x/week to address the above listed problems via self-care/home management, therapeutic activities, therapeutic exercises  · Plan of Care Expires: 04/13/22  · Plan of Care Reviewed with: patient    Subjective     Pain/Comfort:  · Pain Rating 1: 0/10  · Pain Rating Post-Intervention 1: 0/10    Objective:     Communicated with: RN Tacho) prior to  session.  Patient found HOB elevated with mcdonald catheter, peripheral IV upon OT entry to room.    General Precautions: Standard, fall   Orthopedic Precautions:N/A   Braces: N/A  Respiratory Status: Room air     Occupational Performance:     Bed Mobility:    · Supine <> sit:     Functional Mobility/Transfers:  · Sit <>Stand:   · CGA, RW x 1 trial from EOB  · Min A, RW x 1 trial from chair  · Two attempts required; some difficulty pushing up from lower surface noted  · Functional Mobility: Pt ambulated ~25 ft in room with CGA, RW.  · Mild postural sway noted, but no instances of LOB observed    Activities of Daily Living:  · Upper Body Dressing: minimum assistance for donning gown on backside like robe while seated at EOB.   · Assist required to pull around backside from left to right      [facility] 6 Click ADL: 17    Treatment & Education:  *Pt educated on role of OT in acute care setting.  *Pt ambulated within room to address coordination, endurance, and balance needed for functional mobility.  *Pt performed activities in standing to address dynamic standing balance needed for ADLs:  -Pt held onto RW with one hand then reached forward to touch hand of therapist: 1 set x 15 reps on each side; SBA, RW for balance  -Marching in place: 1 set x 20 reps; CGA, RW  *Pt performed UB exercises to provide stretch and promote increased endurance needed for ADLs: 2 sets x 12 reps per exercise, seated up in chair  -Overhead claps  -Forward circular rows  -Shoulder abduction/adduction  -Bicep curls  *POC reviewed with pt        Patient left up in chair with all lines intact, call button in reach and RN (Roselia) notifiedEducation:      GOALS:   Multidisciplinary Problems     Occupational Therapy Goals        Problem: Occupational Therapy Goal    Goal Priority Disciplines Outcome Interventions   Occupational Therapy Goal     OT, PT/OT Ongoing, Progressing    Description: Goals to be met by: 4/4/2022    Patient will increase functional  independence with ADLs by performing:    UE Dressing with SBA.  LE Dressing with Maximum Assistance.  Grooming while standing at sink with Stand-by Assistance.   Toileting from toilet with Minimal Assistance for hygiene and clothing management.   Step transfer with Stand-by Assistance.    Toilet transfer to toilet with Stand-by Assistance.      COMPLETED GOALS:  Step transfer with Minimal Assistance.  GOAL MET 3/23/2022.  Toilet transfer to toilet with Minimal Assistance.  GOAL MET 3/23/2022.  Grooming while standing at sink with Minimal Assistance.  GOAL MET 3/23/2022.  UE Dressing with Minimal Assistance.  MET 3/28/2022                     Time Tracking:     OT Date of Treatment: 03/28/22  OT Start Time: 1001  OT Stop Time: 1025  OT Total Time (min): 24 min    Billable Minutes:Therapeutic Activity 24    OT/MIGUEL: OT          3/28/2022

## 2022-03-28 NOTE — PLAN OF CARE
[facility] ([place])      HOME HEALTH ORDERS  FACE TO FACE ENCOUNTER    Patient Name: Pearl Villafana  YOB: 1940    PCP: Cleopatra Whaley MD   PCP Address: [address] / [place]  PCP Phone Number: [phone]  PCP Fax: [phone]    Encounter Date: 3/12/22    Admit to Home Health    Diagnoses:  Active Hospital Problems    Diagnosis  POA    *Sepsis [A41.9]  Yes    Decubitus ulcer of back, stage 1 [L89.101]  Yes    Altered mental status, unspecified [R41.82]  Yes    Acute renal failure superimposed on stage 3 chronic kidney disease [N17.9, N18.30]  Yes    ETOH abuse [F10.10]  Yes    Hyponatremia [E87.1]  Yes    Hypertension [I10]  Yes    Hyperlipidemia [E78.5]  Yes    Diabetes mellitus type II [E11.9]  Yes     Dx updated per 2019 IMO Load        Resolved Hospital Problems   No resolved problems to display.       Follow Up Appointments:  Future Appointments   Date Time Provider Department Center   4/6/2022  1:00 PM Shira Middleton NP [facility]       Allergies:Review of patient's allergies indicates:  No Known Allergies    Medications: Review discharge medications with patient and family and provide education.    Current Facility-Administered Medications   Medication Dose Route Frequency Provider Last Rate Last Admin    acetaminophen tablet 650 mg  650 mg Oral Q4H PRN Earline Peters DNP   650 mg at 03/24/22 0855    amLODIPine tablet 2.5 mg  2.5 mg Oral Daily Drake Villarreal MD   2.5 mg at 03/28/22 0843    carvediloL tablet 25 mg  25 mg Oral BID Drake Villarreal MD   25 mg at 03/28/22 0843    dextrose 10% bolus 125 mL  12.5 g Intravenous PRN Earline Peters DNP        dextrose 10% bolus 250 mL  25 g Intravenous PRN Earline Peters DNP        glucagon (human recombinant) injection 1 mg  1 mg Intramuscular PRN Earline Peters DNP        glucose chewable tablet 16 g  16 g Oral PRN Earline Peters DNP        glucose  chewable tablet 24 g  24 g Oral PRN Earline Peters DNP        hydrALAZINE tablet 25 mg  25 mg Oral Q6H PRN Drake Villarreal MD   25 mg at 03/22/22 0059    insulin aspart U-100 pen 0-5 Units  0-5 Units Subcutaneous QID (AC + HS) PRN Earline Peters DNP   3 Units at 03/28/22 1224    insulin detemir U-100 pen 5 Units  5 Units Subcutaneous BID Drake Villarreal MD   5 Units at 03/28/22 0843    LIDOcaine 5 % patch 1 patch  1 patch Transdermal Q24H Drake Villarreal MD   1 patch at 03/27/22 1548    loperamide capsule 2 mg  2 mg Oral QID PRN Drake Villarreal MD   2 mg at 03/27/22 1854    losartan tablet 50 mg  50 mg Oral BID Drake Villarreal MD   50 mg at 03/28/22 0843    melatonin tablet 6 mg  6 mg Oral Nightly PRN Earline Silva PA-C   6 mg at 03/24/22 2019    ondansetron injection 4 mg  4 mg Intravenous Q6H PRN Earline Peters DNP        polyethylene glycol packet 17 g  17 g Oral BID PRN Earline Peters DNP         Current Discharge Medication List      START taking these medications    Details   amLODIPine (NORVASC) 2.5 MG tablet Take 1 tablet (2.5 mg total) by mouth once daily.  Qty: 30 tablet, Refills: 11    Comments: .      carvediloL (COREG) 25 MG tablet Take 1 tablet (25 mg total) by mouth 2 (two) times daily.  Qty: 60 tablet, Refills: 11    Comments: .      insulin aspart U-100 (NOVOLOG) 100 unit/mL (3 mL) InPn pen Inject 0-5 Units into the skin before meals and at bedtime as needed (Hyperglycemia per protocol above).  Qty: 9 mL, Refills: 0      insulin detemir U-100 (LEVEMIR FLEXTOUCH) 100 unit/mL (3 mL) SubQ InPn pen Inject 5 Units into the skin 2 (two) times daily.  Qty: 9 mL, Refills: 0         CONTINUE these medications which have CHANGED    Details   losartan (COZAAR) 50 MG tablet Take 1 tablet (50 mg total) by mouth 2 (two) times a day.  Qty: 180 tablet, Refills: 3    Comments: .         CONTINUE these medications which have NOT CHANGED    Details   rosuvastatin  (CRESTOR) 10 MG tablet Take 10 mg by mouth nightly.         STOP taking these medications       allopurinoL (ZYLOPRIM) 100 MG tablet Comments:   Reason for Stopping:         busPIRone (BUSPAR) 5 MG Tab Comments:   Reason for Stopping:         FLUoxetine 20 MG capsule Comments:   Reason for Stopping:         lisinopril (PRINIVIL,ZESTRIL) 5 MG tablet Comments:   Reason for Stopping:         metFORMIN (GLUCOPHAGE-XR) 500 MG ER 24hr tablet Comments:   Reason for Stopping:         metoprolol tartrate (LOPRESSOR) 25 MG tablet Comments:   Reason for Stopping:                 I have seen and examined this patient within the last 30 days. My clinical findings that support the need for the home health skilled services and home bound status are the following:no   Weakness/numbness causing balance and gait disturbance due to Weakness/Debility making it taxing to leave home.     Diet:   cardiac diet and diabetic diet 2000 calorie    Labs:  N/A    Referrals/ Consults  Physical Therapy to evaluate and treat. Evaluate for home safety and equipment needs; Establish/upgrade home exercise program. Perform / instruct on therapeutic exercises, gait training, transfer training, and Range of Motion.  Occupational Therapy to evaluate and treat. Evaluate home environment for safety and equipment needs. Perform/Instruct on transfers, ADL training, ROM, and therapeutic exercises.   to evaluate for community resources/long-range planning.  Aide to provide assistance with personal care, ADLs, and vital signs.    Activities:   activity as tolerated    Nursing:   Agency to admit patient within 24 hours of hospital discharge unless specified on physician order or at patient request    SN to complete comprehensive assessment including routine vital signs. Instruct on disease process and s/s of complications to report to MD. Review/verify medication list sent home with the patient at time of discharge  and instruct patient/caregiver as  needed. Frequency may be adjusted depending on start of care date.     Skilled nurse to perform up to 3 visits PRN for symptoms related to diagnosis    Notify MD if SBP > 160 or < 90; DBP > 90 or < 50; HR > 120 or < 50; Temp > 101; O2 < 88%; Other:       Ok to schedule additional visits based on staff availability and patient request on consecutive days within the home health episode.    When multiple disciplines ordered:    Start of Care occurs on Sunday - Wednesday schedule remaining discipline evaluations as ordered on separate consecutive days following the start of care.    Thursday SOC -schedule subsequent evaluations Friday and Monday the following week.     Friday - Saturday SOC - schedule subsequent discipline evaluations on consecutive days starting Monday of the following week.    For all post-discharge communication and subsequent orders please contact patient's primary care physician.     Miscellaneous   Zayas Care: Instruct patient/caregiver to empty Zayas bag.  Change Zayas every month.  Diabetic Care:   SN to perform and educate Diabetic management with blood glucose monitoring:, Fingerstick blood sugar AC and HS and Report CBG < 60 or > 350 to physician.    Home Health Aide:  Nursing Daily, Physical Therapy Daily, Occupational Therapy Daily, Medical Social Work Three times weekly and Home Health Aide Daily    Wound Care Orders  no    I certify that this patient is confined to her home and needs physical therapy and occupational therapy.

## 2022-03-28 NOTE — PLAN OF CARE
CM met with pt and her daughter, Magdalena. Discharge plan has changed and pt wants to go home with HH and will go to her daughter's home.    Home health arrangements are in progress and family will  be notified for the first visit.     Pt's discharge meds to be sent to pt's outside pharmacy, [facility].    DME pending, waiting on response from therapy.    Daughter will provide transportation home. CM to schedule follow-up apt. With pt's PCP and add to AVS.    Pt ready for discharge from CM perspective.   03/28/22 1601   Final Note   Assessment Type Final Discharge Note   Anticipated Discharge Disposition Home-Health   What phone number can be called within the next 1-3 days to see how you are doing after discharge? [phone]   Hospital Resources/Appts/Education Provided Provided patient/caregiver with written discharge plan information;Appointments scheduled by Navigator/Coordinator   Post-Acute Status   Post-Acute Authorization Home Health   Post-Acute Placement Status Referrals Sent   Patient choice form signed by patient/caregiver List with quality metrics by geographic area provided;List from CMS Compare;List from System Post-Acute Care   Discharge Delays None known at this time       [facility]  Discharge Final Note    Primary Care Provider: Cleopatra Whaley MD    Expected Discharge Date: 3/28/2022    Final Discharge Note (most recent)       Final Note - 03/28/22 1601          Final Note    Assessment Type Final Discharge Note (P)      Anticipated Discharge Disposition Home-Health Care Svc (P)      What phone number can be called within the next 1-3 days to see how you are doing after discharge? [phone] (P)      Hospital Resources/Appts/Education Provided Provided patient/caregiver with written discharge plan information;Appointments scheduled by Navigator/Coordinator (P)         Post-Acute Status    Post-Acute Authorization Home Health (P)      Post-Acute Placement Status Referrals  Sent (P)      Patient choice form signed by patient/caregiver List with quality metrics by geographic area provided;List from CMS Compare;List from System Post-Acute Care (P)      Discharge Delays None known at this time (P)                      Important Message from Medicare  Important Message from Medicare regarding Discharge Appeal Rights: Explained to patient/caregiver, Given to patient/caregiver, Signed/date by patient/caregiver     Date IMM was signed: 03/25/22  Time IMM was signed: 0943

## 2022-03-29 ENCOUNTER — PATIENT OUTREACH (OUTPATIENT)
Dept: ADMINISTRATIVE | Facility: OTHER | Age: 82
End: 2022-03-29
Payer: MEDICARE

## 2022-03-29 ENCOUNTER — PATIENT OUTREACH (OUTPATIENT)
Dept: ADMINISTRATIVE | Facility: CLINIC | Age: 82
End: 2022-03-29
Payer: MEDICARE

## 2022-03-29 NOTE — PROGRESS NOTES
Spoke to daughter; has not picked up medications at pharmacy. States will  this afternoon. Will callback in the morning for TCC Call  C3 nurse attempted to contact Pearl Villafana  daughter for a TCC post hospital discharge follow up call. The patient is unable to conduct the call @ this time. The patient requested a callback.    The patient does not have a scheduled HOSFU appointment within 7-14 days post hospital discharge date 3/28.  Non [facility] PCP

## 2022-03-29 NOTE — PROGRESS NOTES
IP Liaison - Final Visit Note    Patient: Pearl Villafana  MRN:  [id number]  Date of Service:  3/29/2022  Completed by:  MARCELLA Dang    Reason for Visit   Patient presents with    IP Liaison Chart Review            Patient Summary     Discharge Date: 3/28/2022  Discharge telephone number/address: [phone] [facility] [address]  Follow up provider: Shira Middleton NP  Follow up appointments: 4/6/2022, 1:00pm  Home Health agency & telephone number: not listed in chart  DME ordered &  name: not listed in chart  Assigned OPCM RN/SW: n/a  Report sent to follow up team (PCP/OPCM) via in basket message: n/a  Community Resources arranged including agency name & contact info: No support & services have been documented.      MARCELLA Dang

## 2022-03-30 PROCEDURE — G0180 MD CERTIFICATION HHA PATIENT: HCPCS | Mod: ,,, | Performed by: STUDENT IN AN ORGANIZED HEALTH CARE EDUCATION/TRAINING PROGRAM

## 2022-03-30 PROCEDURE — G0180 PR HOME HEALTH MD CERTIFICATION: ICD-10-PCS | Mod: ,,, | Performed by: STUDENT IN AN ORGANIZED HEALTH CARE EDUCATION/TRAINING PROGRAM

## 2022-04-06 ENCOUNTER — OFFICE VISIT (OUTPATIENT)
Dept: UROLOGY | Facility: CLINIC | Age: 82
End: 2022-04-06
Payer: MEDICARE

## 2022-04-06 VITALS
SYSTOLIC BLOOD PRESSURE: 90 MMHG | HEART RATE: 90 BPM | BODY MASS INDEX: 24.09 KG/M2 | WEIGHT: 149.25 LBS | DIASTOLIC BLOOD PRESSURE: 54 MMHG

## 2022-04-06 DIAGNOSIS — R33.9 URINARY RETENTION: ICD-10-CM

## 2022-04-06 PROCEDURE — 3074F SYST BP LT 130 MM HG: CPT | Mod: CPTII,S$GLB,, | Performed by: NURSE PRACTITIONER

## 2022-04-06 PROCEDURE — 1111F PR DISCHARGE MEDS RECONCILED W/ CURRENT OUTPATIENT MED LIST: ICD-10-PCS | Mod: CPTII,S$GLB,, | Performed by: NURSE PRACTITIONER

## 2022-04-06 PROCEDURE — 1160F RVW MEDS BY RX/DR IN RCRD: CPT | Mod: CPTII,S$GLB,, | Performed by: NURSE PRACTITIONER

## 2022-04-06 PROCEDURE — 3288F PR FALLS RISK ASSESSMENT DOCUMENTED: ICD-10-PCS | Mod: CPTII,S$GLB,, | Performed by: NURSE PRACTITIONER

## 2022-04-06 PROCEDURE — 3078F PR MOST RECENT DIASTOLIC BLOOD PRESSURE < 80 MM HG: ICD-10-PCS | Mod: CPTII,S$GLB,, | Performed by: NURSE PRACTITIONER

## 2022-04-06 PROCEDURE — 1101F PR PT FALLS ASSESS DOC 0-1 FALLS W/OUT INJ PAST YR: ICD-10-PCS | Mod: CPTII,S$GLB,, | Performed by: NURSE PRACTITIONER

## 2022-04-06 PROCEDURE — 1111F DSCHRG MED/CURRENT MED MERGE: CPT | Mod: CPTII,S$GLB,, | Performed by: NURSE PRACTITIONER

## 2022-04-06 PROCEDURE — 3078F DIAST BP <80 MM HG: CPT | Mod: CPTII,S$GLB,, | Performed by: NURSE PRACTITIONER

## 2022-04-06 PROCEDURE — 51700 IRRIGATION OF BLADDER: CPT | Mod: S$GLB,,, | Performed by: NURSE PRACTITIONER

## 2022-04-06 PROCEDURE — 3288F FALL RISK ASSESSMENT DOCD: CPT | Mod: CPTII,S$GLB,, | Performed by: NURSE PRACTITIONER

## 2022-04-06 PROCEDURE — 3074F PR MOST RECENT SYSTOLIC BLOOD PRESSURE < 130 MM HG: ICD-10-PCS | Mod: CPTII,S$GLB,, | Performed by: NURSE PRACTITIONER

## 2022-04-06 PROCEDURE — 1126F AMNT PAIN NOTED NONE PRSNT: CPT | Mod: CPTII,S$GLB,, | Performed by: NURSE PRACTITIONER

## 2022-04-06 PROCEDURE — 51700 PR IRRIGATION, BLADDER: ICD-10-PCS | Mod: S$GLB,,, | Performed by: NURSE PRACTITIONER

## 2022-04-06 PROCEDURE — 1159F MED LIST DOCD IN RCRD: CPT | Mod: CPTII,S$GLB,, | Performed by: NURSE PRACTITIONER

## 2022-04-06 PROCEDURE — 1160F PR REVIEW ALL MEDS BY PRESCRIBER/CLIN PHARMACIST DOCUMENTED: ICD-10-PCS | Mod: CPTII,S$GLB,, | Performed by: NURSE PRACTITIONER

## 2022-04-06 PROCEDURE — 99213 OFFICE O/P EST LOW 20 MIN: CPT | Mod: 25,S$GLB,, | Performed by: NURSE PRACTITIONER

## 2022-04-06 PROCEDURE — 1126F PR PAIN SEVERITY QUANTIFIED, NO PAIN PRESENT: ICD-10-PCS | Mod: CPTII,S$GLB,, | Performed by: NURSE PRACTITIONER

## 2022-04-06 PROCEDURE — 1101F PT FALLS ASSESS-DOCD LE1/YR: CPT | Mod: CPTII,S$GLB,, | Performed by: NURSE PRACTITIONER

## 2022-04-06 PROCEDURE — 1159F PR MEDICATION LIST DOCUMENTED IN MEDICAL RECORD: ICD-10-PCS | Mod: CPTII,S$GLB,, | Performed by: NURSE PRACTITIONER

## 2022-04-06 PROCEDURE — 99213 PR OFFICE/OUTPT VISIT, EST, LEVL III, 20-29 MIN: ICD-10-PCS | Mod: 25,S$GLB,, | Performed by: NURSE PRACTITIONER

## 2022-04-06 NOTE — PROGRESS NOTES
Subjective:      Pearl Villafana is a 81 y.o. female who was referred by Dr. Villarreal for evaluation of her urinary retention.      The patient was brought to the ED on 2/22 by EMS after being found down in her home covered in stool and urine. Admitted with sepsis protocol. Urine culture showed no growth. Blood culture showed likely contamination. Antibiotics were discontinued. Failed VT during her admission.     She presents today for VT. Denies a history of difficulty voiding or prior episodes of retention. Denies flank pain and fever/chills. Taking miralax for constipation.     The following portions of the patient's history were reviewed and updated as appropriate: allergies, current medications, past family history, past medical history, past social history, past surgical history and problem list.    Review of Systems  Constitutional: no fever or chills  ENT: no nasal congestion or sore throat  Respiratory: no cough or shortness of breath  Cardiovascular: no chest pain or palpitations  Gastrointestinal: no nausea or vomiting, tolerating diet  Genitourinary: as per HPI  Hematologic/Lymphatic: no easy bruising or lymphadenopathy  Musculoskeletal: no arthralgias or myalgias  Neurological: no seizures or tremors  Behavioral/Psych: no auditory or visual hallucinations     Objective:   Vital Signs:   Vitals:    04/06/22 1329   BP: (!) 90/54   Pulse: 90     Physical Exam   General: alert and oriented, no acute distress  Head: normocephalic, atraumatic  Neck: supple, normal ROM  Respiratory: Symmetric expansion, non-labored breathing  Cardiovascular: regular rate and rhythm  Abdomen: soft, non tender, non distended  Pelvic: mcdonald to gravity with clear, hiram urine   Skin: normal coloration and turgor, no rashes, no suspicious skin lesions noted  Neuro: alert and oriented x3, no gross deficits  Psych: normal judgment and insight, normal mood/affect and non-anxious    Lab Review   Urinalysis demonstrates : no specimen,  mcdonald    Lab Results   Component Value Date    WBC 8.46 03/15/2022    HGB 11.0 (L) 03/15/2022    HCT 33.9 (L) 03/15/2022    MCV 92 03/15/2022     (L) 03/15/2022     Lab Results   Component Value Date    CREATININE 0.8 03/15/2022    BUN 28 (H) 03/15/2022       Imaging   None    Voiding Trial (Fill/Pull/Void): 120cc of sterile saline was instilled into the bladder through the previously placed mcdonald catheter.  The mcdonald balloon was then deflated and the mcdonald removed.  The patient subsequently voided 120cc, consistent with successful voiding trial.  The mcdonald was not replaced.    Assessment:   Urinary retention     Plan:   Pearl was seen today for urine retention.    Diagnoses and all orders for this visit:    Urinary retention  -     Ambulatory referral/consult to Urology    Plan:  --Successful VT  --Suspect that this episode of retention was related to constipation and immobility in the hospital  --Avoid constipation  --Follow up PRN

## 2022-04-13 ENCOUNTER — EXTERNAL HOME HEALTH (OUTPATIENT)
Dept: HOME HEALTH SERVICES | Facility: HOSPITAL | Age: 82
End: 2022-04-13
Payer: MEDICARE

## 2022-04-13 NOTE — ASSESSMENT & PLAN NOTE
Patient presented after family contacted EMS to do a welfare check - they found her down on the ground with altered mental status in urine and feces.  Length of time down on known.  Patient was hypothermic at 95.5° and tachycardic at 104 with moderate bacteriuria in her urinalysis.  Code sepsis was called in ED and patient received a bolus of normal saline, as well as IV Vancomycin and Zosyn. Chest x-ray was negative for any consolidation or pleural effusions.  Concern for UTI due to moderate bacteriuria on analysis.  WBC was elevated at 14.77 with left shift, Procalcitonin of 0.34, normal lactic acid, normal ammonia, negative ETOH and Tox screen.  Blood cultures growing 1/4 bottles with Coag-negative Staph (likely contaminant)  and Urine culture was negative. Antibiotics were d/c on 3/14/2022.  Failed Voiding Trial yesterday and Zayas replaced overnight.  Patient is awake and oriented to person, place, month, year, but not to President.  Can do serial 7s to 14.  Awaiting SNF placement.    Plan:  Follow off antibiotics for now - discontinued pharmacy consult order  PT consulted - recommend SNF - patient agrees  Continue Zayas with repeat Voiding Trial while at SNF   [No Acute Distress] : no acute distress [Well Nourished] : well nourished [Well Developed] : well developed [Well-Appearing] : well-appearing [Normal Sclera/Conjunctiva] : normal sclera/conjunctiva [PERRL] : pupils equal round and reactive to light [EOMI] : extraocular movements intact [Normal Outer Ear/Nose] : the outer ears and nose were normal in appearance [Normal Oropharynx] : the oropharynx was normal [No JVD] : no jugular venous distention [Supple] : supple [No Lymphadenopathy] : no lymphadenopathy [Thyroid Normal, No Nodules] : the thyroid was normal and there were no nodules present [No Respiratory Distress] : no respiratory distress  [Clear to Auscultation] : lungs were clear to auscultation bilaterally [No Accessory Muscle Use] : no accessory muscle use [Normal Rate] : normal rate  [Regular Rhythm] : with a regular rhythm [Normal S1, S2] : normal S1 and S2 [No Murmur] : no murmur heard [No Carotid Bruits] : no carotid bruits [No Abdominal Bruit] : a ~M bruit was not heard ~T in the abdomen [No Varicosities] : no varicosities [Pedal Pulses Present] : the pedal pulses are present [No Edema] : there was no peripheral edema [No Extremity Clubbing/Cyanosis] : no extremity clubbing/cyanosis [No Palpable Aorta] : no palpable aorta [Soft] : abdomen soft [Non Tender] : non-tender [Non-distended] : non-distended [No Masses] : no abdominal mass palpated [No HSM] : no HSM [Normal Bowel Sounds] : normal bowel sounds [Normal Posterior Cervical Nodes] : no posterior cervical lymphadenopathy [Normal Anterior Cervical Nodes] : no anterior cervical lymphadenopathy [No CVA Tenderness] : no CVA  tenderness [No Spinal Tenderness] : no spinal tenderness [No Joint Swelling] : no joint swelling [Grossly Normal Strength/Tone] : grossly normal strength/tone [No Rash] : no rash [Normal Gait] : normal gait [Coordination Grossly Intact] : coordination grossly intact [No Focal Deficits] : no focal deficits [Deep Tendon Reflexes (DTR)] : deep tendon reflexes were 2+ and symmetric [Normal Affect] : the affect was normal [Normal Insight/Judgement] : insight and judgment were intact

## 2022-04-13 NOTE — DISCHARGE SUMMARY
[facility]  Discharge Summary      Patient Name: Pearl Villafana  MRN: [id number]  Patient Class: IP- Inpatient  Admission Date: 3/12/2022  Hospital Length of Stay: 16 days  Discharge Date and Time: 3/28/2022  6:07 PM  Attending Physician: No att. providers found   Discharging Provider: Kelechi Thomas MD  Primary Care Provider: Cleopatra Whaley MD      HPI:   Calista Kang is an 81-year-old female with a past medical history of alcohol abuse, polypharmacy, colon cancer, breast cancer and hyponatremia along with diabetes hyper tension hyperlipidemia B12 deficiency and gout.  Her family contacted EMS to do a welfare check and they found her down on the ground with altered mental status in urine and feces.  Length of time down on known.  Patient is arousable to tactile touch in the bed.  She is able to tell her name her location and her date of birth.  She does follow simple commands.  Patient answers no to most questions.  She does name objects correctly.  Patient is hypothermic at 95.5° and tachycardic at 104 with moderate bacteriuria in her urinalysis. Code sepsis was called patient received a bolus of normal saline vancomycin and Zosyn.  No signs of respiratory distress.  Patient has some bruising to her right right lateral abdominal area.  No abdominal distension.  No tenderness or guarding to abdominal area.  Patient denies pain.  Patient reports she does not know why mg for how long.  She says that she remembers eating breakfast yesterday and going to some doctor appointments.  There is no family at bedside and her 's phone number is disconnected.  There are no other family members phone numbers in her chart.  Unable to confirm her history or her medications at this time.  Patient admitted to hospital medicine for further workup.      * No surgery found *      Hospital Course:   Patient presented after family contacted EMS to do a welfare check - they found her down on  the ground with altered mental status in urine and feces.  Length of time down on known.  Patient was hypothermic at 95.5° and tachycardic at 104 with moderate bacteriuria in her urinalysis.  Code sepsis was called in ED and patient received a bolus of normal saline, as well as IV Vancomycin and Zosyn. Chest x-ray was negative for any consolidation or pleural effusions.  Concern for UTI due to moderate bacteriuria on analysis.  WBC was elevated at 14.77 with left shift, Procalcitonin of 0.34, normal lactic acid, normal ammonia, negative ETOH and Tox screen.  Blood cultures growing 1/4 bottles with Coag-negative Staph (likely contaminant)  and Urine culture was negative. Antibiotics were d/c on 3/14/2022.  Failed Voiding Trial yesterday and Zayas replaced overnight.  Patient is awake and oriented to person, place, month, year, but not to President.  Can do serial 7s to 14.  Awaiting SNF placement. Unable to obtain after > 1 week so after discussion with daughters, patient discharged home with .        Goals of Care Treatment Preferences:  Code Status: Full Code      Consults:   Consults (From admission, onward)        Status Ordering Provider     Inpatient consult to Registered Dietitian/Nutritionist  Once        Provider:  (Not yet assigned)    Completed MAX HU     Inpatient virtual consult to Hospital Medicine  Once        Provider:  Kelechi Thomas MD    Completed IZABELA SILVA     Inpatient consult to Registered Dietitian/Nutritionist  Once        Provider:  (Not yet assigned)    Completed ROBERTA RICARDO     Case Management/  Once        Provider:  (Not yet assigned)    Completed PHILL TRAMMELL          * Sepsis  Patient presented after family contacted EMS to do a welfare check - they found her down on the ground with altered mental status in urine and feces.  Length of time down on known.  Patient was hypothermic at 95.5° and tachycardic at 104 with moderate  bacteriuria in her urinalysis.  Code sepsis was called in ED and patient received a bolus of normal saline, as well as IV Vancomycin and Zosyn. Chest x-ray was negative for any consolidation or pleural effusions.  Concern for UTI due to moderate bacteriuria on analysis.  WBC was elevated at 14.77 with left shift, Procalcitonin of 0.34, normal lactic acid, normal ammonia, negative ETOH and Tox screen.  Blood cultures growing 1/4 bottles with Coag-negative Staph (likely contaminant)  and Urine culture was negative. Antibiotics were d/c on 3/14/2022.  Failed Voiding Trial yesterday and Zayas replaced overnight.  Patient is awake and oriented to person, place, month, year, but not to President.  Can do serial 7s to 14.  Awaiting SNF placement.    Plan:  Follow off antibiotics for now - discontinued pharmacy consult order  PT consulted - recommend SNF - patient agrees  Continue Zayas with repeat Voiding Trial while at SNF    Decubitus ulcer of back, stage 1  -Continue with wound care      ETOH abuse  Elevated beta hydroxybutyrate.  Ethanol less than 10. Last drink on known.  Patient with history of beer potamania.  Stable without need for benzodiazepine treatment.  -Monitor CIWA scale every 4 hours.      Acute renal failure superimposed on stage 3 chronic kidney disease    - Resolved      Altered mental status, unspecified    - Resolved      Hyponatremia    - Resolved      Diabetes mellitus type II    - Improving, last BG at goal, only mild elevations over last 24h  - Not associated with long-term insulin use  - Complicated by hyperglycemia, BG still above goal on DM diet. Improves w/ SSI  - Home Meds:  Metformin 500 mg daily at home per chart.    A1c is 6.6  -Continue to hold Metformin at this time.    -Continue Low-dose correction sliding scale   - Continue detemir 5 units BID      Hyperlipidemia  On Crestor as outpatient - held this time due to elevation of liver enzymes.      Hypertension    - Improved, last BP  "normal  - Continue losartan 50 mg BID  - Continue coreg at 25 mg PO BID  - Continue hydralazine 25 mg PO Q6H PRN SBP > 170   - Continue amlodipine 5 mg daily  - Monitor and adjust as needed      Final Active Diagnoses:    Diagnosis Date Noted POA    PRINCIPAL PROBLEM:  Sepsis [A41.9] 03/12/2022 Yes    Decubitus ulcer of back, stage 1 [L89.101] 03/14/2022 Yes    Altered mental status, unspecified [R41.82] 03/12/2022 Yes    Acute renal failure superimposed on stage 3 chronic kidney disease [N17.9, N18.30] 03/12/2022 Yes    ETOH abuse [F10.10] 03/12/2022 Yes    Hyponatremia [E87.1] 05/04/2021 Yes    Hypertension [I10]  Yes    Hyperlipidemia [E78.5]  Yes    Diabetes mellitus type II [E11.9]  Yes      Problems Resolved During this Admission:       Discharged Condition: stable    [place] [facility] [place]    Patient Instructions:      WALKER FOR HOME USE     Order Specific Question Answer Comments   Type of Walker: Adult (5'4"-6'6")    With wheels? Yes    Height: 5' 6" (1.676 m)    Weight: 68.5 kg (151 lb)    Length of need (1-99 months): 99    Does patient have medical equipment at home? none    Please check all that apply: Patient's condition impairs ambulation.    Please check all that apply: Patient is unable to safely ambulate without equipment.    Please check all that apply: Walker will be used for gait training.    Please check all that apply: Patient needs help to get in and out of chair.      Ambulatory referral/consult to Urology   Standing Status: Future   Referral Priority: Routine Referral Type: Consultation   Referral Reason: Specialty Services Required   Requested Specialty: Urology   Number of Visits Requested: 1     Notify your health care provider if you experience any of the following:  temperature >100.4     Notify your health care provider if you experience any of the following:  persistent nausea and vomiting or diarrhea     Notify your health care provider if you " experience any of the following:  severe uncontrolled pain     Notify your health care provider if you experience any of the following:  redness, tenderness, or signs of infection (pain, swelling, redness, odor or green/yellow discharge around incision site)     Notify your health care provider if you experience any of the following:  difficulty breathing or increased cough     Notify your health care provider if you experience any of the following:  severe persistent headache     Notify your health care provider if you experience any of the following:  worsening rash     Notify your health care provider if you experience any of the following:  persistent dizziness, light-headedness, or visual disturbances     Notify your health care provider if you experience any of the following:  increased confusion or weakness       Significant Diagnostic Studies: Labs: All labs within the past 24 hours have been reviewed    Pending Diagnostic Studies:     Procedure Component Value Units Date/Time    Lactic acid, plasma #2 [[id number]] Collected: 03/12/22 1748    Order Status: Sent Lab Status: In process Updated: 03/12/22 1749    Specimen: Blood          Medications:  Reconciled Home Medications:      Medication List      START taking these medications    amLODIPine 2.5 MG tablet  Commonly known as: NORVASC  Take 1 tablet (2.5 mg total) by mouth once daily.     carvediloL 25 MG tablet  Commonly known as: COREG  Take 1 tablet (25 mg total) by mouth 2 (two) times daily.        CHANGE how you take these medications    losartan 50 MG tablet  Commonly known as: COZAAR  Take 1 tablet (50 mg total) by mouth 2 (two) times a day.  What changed:   · medication strength  · how much to take  · when to take this     metFORMIN 500 MG ER 24hr tablet  Commonly known as: GLUCOPHAGE-XR  Take 1 tablet (500 mg total) by mouth daily with breakfast.  What changed: when to take this        CONTINUE taking these medications    rosuvastatin 10 MG  tablet  Commonly known as: CRESTOR  Take 10 mg by mouth nightly.        STOP taking these medications    allopurinoL 100 MG tablet  Commonly known as: ZYLOPRIM     busPIRone 5 MG Tab  Commonly known as: BUSPAR     FLUoxetine 20 MG capsule     lisinopriL 5 MG tablet  Commonly known as: PRINIVIL,ZESTRIL     metoprolol tartrate 25 MG tablet  Commonly known as: LOPRESSOR            Indwelling Lines/Drains at time of discharge:   Lines/Drains/Airways     Drain  Duration                Urethral Catheter 03/16/22 0040 Non-latex;Straight-tip 16 Fr. 28 days                Time spent on the discharge of patient: > 35 minutes         The attending portion of this evaluation, treatment, and documentation was performed per Kelechi Thomas MD via Telemedicine AudioVisual using the secure [employer] software platform with 2 way audio/video. The provider was located off-site and the patient is located in the hospital. The aforementioned video software was utilized to document the relevant history and physical exam    Kelehci Thomas MD  Department of Hospital Medicine  [facility] ([place])

## 2022-04-21 ENCOUNTER — DOCUMENT SCAN (OUTPATIENT)
Dept: HOME HEALTH SERVICES | Facility: HOSPITAL | Age: 82
End: 2022-04-21
Payer: MEDICARE

## 2022-04-21 ENCOUNTER — DOCUMENT SCAN (OUTPATIENT)
Dept: HOME HEALTH SERVICES | Facility: HOSPITAL | Age: 82
End: 2022-04-21

## 2023-11-11 NOTE — ASSESSMENT & PLAN NOTE
- Resolved     stated will assess patient at bedside Provider to order pepcid and stated she will come to see pt

## 2024-04-02 NOTE — PT/OT/SLP PROGRESS
Occupational Therapy      Patient Name:  Pearl Villafana   MRN:  [id number]    Attempted to see pt at 11:13.  Upon arrival to room pt reported increased pain in foot and requested to hold off on therapy.  Encouragement provided and OT educated pt on benefits of performing exercises while in the bed with pt verbalizing understanding.  Pt then reported she needed to be cleaned; call button utilized to alert . Will follow-up as schedule permits.    EMMY Siu  3/21/2022   Please discuss the following with patient:    All labs essentially normal except your diabetes control has worsened to 8.4. This would place you at risk of poor wound healing following your surgery. I shared your results with Dr. Shelby Aden in case he wants to touch base with you much sooner than your current may appointment.